# Patient Record
Sex: MALE | Race: WHITE | NOT HISPANIC OR LATINO | Employment: OTHER | URBAN - METROPOLITAN AREA
[De-identification: names, ages, dates, MRNs, and addresses within clinical notes are randomized per-mention and may not be internally consistent; named-entity substitution may affect disease eponyms.]

---

## 2015-07-21 LAB — CRC RECOMMENDATION EXT: NORMAL

## 2017-02-21 ENCOUNTER — HISTORICAL (OUTPATIENT)
Dept: LAB | Facility: HOSPITAL | Age: 67
End: 2017-02-21

## 2017-04-24 ENCOUNTER — HISTORICAL (OUTPATIENT)
Dept: LAB | Facility: HOSPITAL | Age: 67
End: 2017-04-24

## 2017-05-24 ENCOUNTER — HISTORICAL (OUTPATIENT)
Dept: LAB | Facility: HOSPITAL | Age: 67
End: 2017-05-24

## 2017-05-24 LAB
ABS NEUT (OLG): 3.43 X10(3)/MCL (ref 2.1–9.2)
APTT PPP: 26.3 SECOND(S) (ref 20.6–36)
BASOPHILS # BLD AUTO: 0.1 X10(3)/MCL (ref 0–0.2)
BASOPHILS NFR BLD AUTO: 1 %
BUN SERPL-MCNC: 15 MG/DL (ref 7–18)
CALCIUM SERPL-MCNC: 9.3 MG/DL (ref 8.5–10.1)
CHLORIDE SERPL-SCNC: 102 MMOL/L (ref 98–107)
CO2 SERPL-SCNC: 31 MMOL/L (ref 21–32)
CREAT SERPL-MCNC: 0.72 MG/DL (ref 0.7–1.3)
EOSINOPHIL # BLD AUTO: 0.2 X10(3)/MCL (ref 0–0.9)
EOSINOPHIL NFR BLD AUTO: 2 %
ERYTHROCYTE [DISTWIDTH] IN BLOOD BY AUTOMATED COUNT: 13.2 % (ref 11.5–17)
GLUCOSE SERPL-MCNC: 166 MG/DL (ref 74–106)
HCT VFR BLD AUTO: 41.5 % (ref 42–52)
HGB BLD-MCNC: 14.5 GM/DL (ref 14–18)
INR PPP: 1.01 (ref 0–1.27)
LYMPHOCYTES # BLD AUTO: 2.6 X10(3)/MCL (ref 0.6–4.6)
LYMPHOCYTES NFR BLD AUTO: 37 %
MCH RBC QN AUTO: 31.2 PG (ref 27–31)
MCHC RBC AUTO-ENTMCNC: 34.9 GM/DL (ref 33–36)
MCV RBC AUTO: 89.2 FL (ref 80–94)
MONOCYTES # BLD AUTO: 0.7 X10(3)/MCL (ref 0.1–1.3)
MONOCYTES NFR BLD AUTO: 10 %
NEUTROPHILS # BLD AUTO: 3.43 X10(3)/MCL (ref 1.4–7.9)
NEUTROPHILS NFR BLD AUTO: 50 %
PLATELET # BLD AUTO: 180 X10(3)/MCL (ref 130–400)
PMV BLD AUTO: 11.2 FL (ref 9.4–12.4)
POTASSIUM SERPL-SCNC: 4.2 MMOL/L (ref 3.5–5.1)
PROTHROMBIN TIME: 13.1 SECOND(S) (ref 12.1–14.2)
RBC # BLD AUTO: 4.65 X10(6)/MCL (ref 4.7–6.1)
SODIUM SERPL-SCNC: 141 MMOL/L (ref 136–145)
WBC # SPEC AUTO: 6.9 X10(3)/MCL (ref 4.5–11.5)

## 2017-05-30 ENCOUNTER — HISTORICAL (OUTPATIENT)
Dept: LAB | Facility: HOSPITAL | Age: 67
End: 2017-05-30

## 2017-05-30 LAB
EST. AVERAGE GLUCOSE BLD GHB EST-MCNC: 151 MG/DL
HBA1C MFR BLD: 6.9 % (ref 4.5–6.2)

## 2017-06-01 ENCOUNTER — HISTORICAL (OUTPATIENT)
Dept: ADMINISTRATIVE | Facility: HOSPITAL | Age: 67
End: 2017-06-01

## 2017-06-02 LAB
ABS NEUT (OLG): 3.94 X10(3)/MCL (ref 2.1–9.2)
BASOPHILS # BLD AUTO: 0 X10(3)/MCL (ref 0–0.2)
BASOPHILS NFR BLD AUTO: 1 %
BUN SERPL-MCNC: 11 MG/DL (ref 7–18)
CALCIUM SERPL-MCNC: 8.6 MG/DL (ref 8.5–10.1)
CHLORIDE SERPL-SCNC: 104 MMOL/L (ref 98–107)
CO2 SERPL-SCNC: 26 MMOL/L (ref 21–32)
CREAT SERPL-MCNC: 0.79 MG/DL (ref 0.7–1.3)
EOSINOPHIL # BLD AUTO: 0.2 X10(3)/MCL (ref 0–0.9)
EOSINOPHIL NFR BLD AUTO: 4 %
ERYTHROCYTE [DISTWIDTH] IN BLOOD BY AUTOMATED COUNT: 13.1 % (ref 11.5–17)
GLUCOSE SERPL-MCNC: 214 MG/DL (ref 74–106)
HCT VFR BLD AUTO: 39.4 % (ref 42–52)
HGB BLD-MCNC: 13.8 GM/DL (ref 14–18)
LYMPHOCYTES # BLD AUTO: 2.4 X10(3)/MCL (ref 0.6–4.6)
LYMPHOCYTES NFR BLD AUTO: 33 %
MCH RBC QN AUTO: 31.7 PG (ref 27–31)
MCHC RBC AUTO-ENTMCNC: 35 GM/DL (ref 33–36)
MCV RBC AUTO: 90.4 FL (ref 80–94)
MONOCYTES # BLD AUTO: 0.6 X10(3)/MCL (ref 0.1–1.3)
MONOCYTES NFR BLD AUTO: 8 %
NEUTROPHILS # BLD AUTO: 3.94 X10(3)/MCL (ref 1.4–7.9)
NEUTROPHILS NFR BLD AUTO: 55 %
PLATELET # BLD AUTO: 140 X10(3)/MCL (ref 130–400)
PMV BLD AUTO: 10.6 FL (ref 9.4–12.4)
POTASSIUM SERPL-SCNC: 3.9 MMOL/L (ref 3.5–5.1)
RBC # BLD AUTO: 4.36 X10(6)/MCL (ref 4.7–6.1)
SODIUM SERPL-SCNC: 139 MMOL/L (ref 136–145)
WBC # SPEC AUTO: 7.2 X10(3)/MCL (ref 4.5–11.5)

## 2017-09-18 ENCOUNTER — HISTORICAL (OUTPATIENT)
Dept: LAB | Facility: HOSPITAL | Age: 67
End: 2017-09-18

## 2017-09-18 LAB
EST. AVERAGE GLUCOSE BLD GHB EST-MCNC: 143 MG/DL
HBA1C MFR BLD: 6.6 % (ref 4.5–6.2)

## 2017-11-21 ENCOUNTER — HISTORICAL (OUTPATIENT)
Dept: PHYSICAL THERAPY | Facility: HOSPITAL | Age: 67
End: 2017-11-21

## 2017-11-29 ENCOUNTER — HISTORICAL (OUTPATIENT)
Dept: PHYSICAL THERAPY | Facility: HOSPITAL | Age: 67
End: 2017-11-29

## 2017-12-01 ENCOUNTER — HISTORICAL (OUTPATIENT)
Dept: PHYSICAL THERAPY | Facility: HOSPITAL | Age: 67
End: 2017-12-01

## 2017-12-08 ENCOUNTER — HISTORICAL (OUTPATIENT)
Dept: PHYSICAL THERAPY | Facility: HOSPITAL | Age: 67
End: 2017-12-08

## 2017-12-12 ENCOUNTER — HISTORICAL (OUTPATIENT)
Dept: PHYSICAL THERAPY | Facility: HOSPITAL | Age: 67
End: 2017-12-12

## 2017-12-14 ENCOUNTER — HISTORICAL (OUTPATIENT)
Dept: PHYSICAL THERAPY | Facility: HOSPITAL | Age: 67
End: 2017-12-14

## 2017-12-20 ENCOUNTER — HISTORICAL (OUTPATIENT)
Dept: LAB | Facility: HOSPITAL | Age: 67
End: 2017-12-20

## 2017-12-20 LAB
ABS NEUT (OLG): 2.9 X10(3)/MCL (ref 2.1–9.2)
BASOPHILS # BLD AUTO: 0 X10(3)/MCL
BASOPHILS NFR BLD AUTO: 1 % (ref 0–2)
BUN SERPL-MCNC: 24.2 MG/DL (ref 7–18)
CALCIUM SERPL-MCNC: 9.5 MG/DL (ref 8.5–10.1)
CHLORIDE SERPL-SCNC: 103 MMOL/L (ref 98–107)
CO2 SERPL-SCNC: 32.8 MMOL/L (ref 21–32)
CREAT SERPL-MCNC: 0.77 MG/DL (ref 0.6–1.3)
EOSINOPHIL # BLD AUTO: 0.2 X10(3)/MCL
EOSINOPHIL NFR BLD AUTO: 2 %
ERYTHROCYTE [DISTWIDTH] IN BLOOD BY AUTOMATED COUNT: 12.8 % (ref 11.5–17)
EST. AVERAGE GLUCOSE BLD GHB EST-MCNC: 146 MG/DL
GLUCOSE SERPL-MCNC: 141 MG/DL (ref 74–106)
HBA1C MFR BLD: 6.7 % (ref 4.5–6.2)
HCT VFR BLD AUTO: 40.4 % (ref 42–52)
HGB BLD-MCNC: 13.8 GM/DL (ref 14–18)
LYMPHOCYTES # BLD AUTO: 3.1 X10(3)/MCL
LYMPHOCYTES NFR BLD AUTO: 46 % (ref 13–40)
MCH RBC QN AUTO: 30.6 PG (ref 27–31)
MCHC RBC AUTO-ENTMCNC: 34.1 GM/DL (ref 33–36)
MCV RBC AUTO: 89.8 FL (ref 80–94)
MONOCYTES # BLD AUTO: 0.6 X10(3)/MCL
MONOCYTES NFR BLD AUTO: 9 % (ref 2–11)
NEUTROPHILS # BLD AUTO: 2.9 X10(3)/MCL (ref 2.1–9.2)
NEUTROPHILS NFR BLD AUTO: 42 % (ref 47–80)
PLATELET # BLD AUTO: 156 X10(3)/MCL (ref 130–400)
PMV BLD AUTO: 8.8 FL (ref 7.4–10.4)
POTASSIUM SERPL-SCNC: 4.5 MMOL/L (ref 3.5–5.1)
RBC # BLD AUTO: 4.5 X10(6)/MCL (ref 4.7–6.1)
SODIUM SERPL-SCNC: 141 MMOL/L (ref 136–145)
WBC # SPEC AUTO: 6.8 X10(3)/MCL (ref 4.5–11.5)

## 2017-12-21 ENCOUNTER — HISTORICAL (OUTPATIENT)
Dept: PHYSICAL THERAPY | Facility: HOSPITAL | Age: 67
End: 2017-12-21

## 2017-12-28 ENCOUNTER — HISTORICAL (OUTPATIENT)
Dept: PHYSICAL THERAPY | Facility: HOSPITAL | Age: 67
End: 2017-12-28

## 2018-01-03 ENCOUNTER — HISTORICAL (OUTPATIENT)
Dept: PHYSICAL THERAPY | Facility: HOSPITAL | Age: 68
End: 2018-01-03

## 2018-01-08 ENCOUNTER — HISTORICAL (OUTPATIENT)
Dept: PHYSICAL THERAPY | Facility: HOSPITAL | Age: 68
End: 2018-01-08

## 2018-01-12 ENCOUNTER — HISTORICAL (OUTPATIENT)
Dept: PHYSICAL THERAPY | Facility: HOSPITAL | Age: 68
End: 2018-01-12

## 2018-01-19 ENCOUNTER — HISTORICAL (OUTPATIENT)
Dept: PHYSICAL THERAPY | Facility: HOSPITAL | Age: 68
End: 2018-01-19

## 2018-03-21 ENCOUNTER — HISTORICAL (OUTPATIENT)
Dept: LAB | Facility: HOSPITAL | Age: 68
End: 2018-03-21

## 2018-03-21 LAB
ABS NEUT (OLG): 3.9 X10(3)/MCL (ref 2.1–9.2)
ALBUMIN SERPL-MCNC: 3.7 GM/DL (ref 3.4–5)
ALBUMIN/GLOB SERPL: 1.1 RATIO (ref 1.1–2)
ALP SERPL-CCNC: 63 UNIT/L (ref 46–116)
ALT SERPL-CCNC: 36 UNIT/L (ref 12–78)
AST SERPL-CCNC: 17 UNIT/L (ref 15–37)
BASOPHILS NFR BLD AUTO: 1 % (ref 0–2)
BILIRUB SERPL-MCNC: 0.5 MG/DL (ref 0.2–1)
BILIRUBIN DIRECT+TOT PNL SERPL-MCNC: 0.15 MG/DL (ref 0–0.2)
BILIRUBIN DIRECT+TOT PNL SERPL-MCNC: 0.33 MG/DL (ref 0–0.8)
BUN SERPL-MCNC: 19.4 MG/DL (ref 7–18)
CALCIUM SERPL-MCNC: 8.8 MG/DL (ref 8.5–10.1)
CHLORIDE SERPL-SCNC: 100 MMOL/L (ref 98–107)
CHOLEST SERPL-MCNC: 165 MG/DL (ref 0–200)
CHOLEST/HDLC SERPL: 4 {RATIO} (ref 0–5)
CO2 SERPL-SCNC: 29.7 MMOL/L (ref 21–32)
CREAT SERPL-MCNC: 0.83 MG/DL (ref 0.6–1.3)
EOSINOPHIL # BLD AUTO: 0.2 X10(3)/MCL
EOSINOPHIL NFR BLD AUTO: 2 %
ERYTHROCYTE [DISTWIDTH] IN BLOOD BY AUTOMATED COUNT: 13.5 % (ref 11.5–17)
EST. AVERAGE GLUCOSE BLD GHB EST-MCNC: 131 MG/DL
GLOBULIN SER-MCNC: 3.4 GM/DL (ref 2.4–3.5)
GLUCOSE SERPL-MCNC: 238 MG/DL (ref 74–106)
HBA1C MFR BLD: 6.2 % (ref 4.5–6.2)
HCT VFR BLD AUTO: 43.3 % (ref 42–52)
HDLC SERPL-MCNC: 41 MG/DL (ref 40–60)
HGB BLD-MCNC: 14.7 GM/DL (ref 14–18)
LDLC SERPL CALC-MCNC: 55 MG/DL (ref 0–129)
LYMPHOCYTES # BLD AUTO: 2.7 X10(3)/MCL
LYMPHOCYTES NFR BLD AUTO: 36 % (ref 13–40)
MCH RBC QN AUTO: 30.7 PG (ref 27–31)
MCHC RBC AUTO-ENTMCNC: 33.9 GM/DL (ref 33–36)
MCV RBC AUTO: 90.6 FL (ref 80–94)
MONOCYTES # BLD AUTO: 0.6 X10(3)/MCL
MONOCYTES NFR BLD AUTO: 8 % (ref 2–11)
NEUTROPHILS # BLD AUTO: 3.9 X10(3)/MCL (ref 2.1–9.2)
NEUTROPHILS NFR BLD AUTO: 52 % (ref 47–80)
PLATELET # BLD AUTO: 160 X10(3)/MCL (ref 130–400)
PMV BLD AUTO: 9.3 FL (ref 7.4–10.4)
POTASSIUM SERPL-SCNC: 3.9 MMOL/L (ref 3.5–5.1)
PROT SERPL-MCNC: 7.1 GM/DL (ref 6.4–8.2)
RBC # BLD AUTO: 4.77 X10(6)/MCL (ref 4.7–6.1)
SODIUM SERPL-SCNC: 137 MMOL/L (ref 136–145)
TRIGL SERPL-MCNC: 344 MG/DL
VLDLC SERPL CALC-MCNC: 69 MG/DL
WBC # SPEC AUTO: 7.5 X10(3)/MCL (ref 4.5–11.5)

## 2018-10-02 ENCOUNTER — HISTORICAL (OUTPATIENT)
Dept: ADMINISTRATIVE | Facility: HOSPITAL | Age: 68
End: 2018-10-02

## 2018-12-04 ENCOUNTER — HISTORICAL (OUTPATIENT)
Dept: LAB | Facility: HOSPITAL | Age: 68
End: 2018-12-04

## 2018-12-04 LAB
ALBUMIN SERPL-MCNC: 3.9 GM/DL (ref 3.4–5)
ALBUMIN/GLOB SERPL: 1.1 RATIO (ref 1.1–2)
ALP SERPL-CCNC: 69 UNIT/L (ref 46–116)
ALT SERPL-CCNC: 36 UNIT/L (ref 12–78)
AST SERPL-CCNC: 24 UNIT/L (ref 15–37)
BILIRUB SERPL-MCNC: 0.3 MG/DL (ref 0.2–1)
BILIRUBIN DIRECT+TOT PNL SERPL-MCNC: 0.09 MG/DL (ref 0–0.2)
BILIRUBIN DIRECT+TOT PNL SERPL-MCNC: 0.21 MG/DL (ref 0–0.8)
BUN SERPL-MCNC: 19 MG/DL (ref 7–18)
CALCIUM SERPL-MCNC: 9.3 MG/DL (ref 8.5–10.1)
CHLORIDE SERPL-SCNC: 102 MMOL/L (ref 98–107)
CO2 SERPL-SCNC: 30.6 MMOL/L (ref 21–32)
CREAT SERPL-MCNC: 0.83 MG/DL (ref 0.6–1.3)
EST. AVERAGE GLUCOSE BLD GHB EST-MCNC: 151 MG/DL
GLOBULIN SER-MCNC: 3.6 GM/DL (ref 2.4–3.5)
GLUCOSE SERPL-MCNC: 134 MG/DL (ref 74–106)
HBA1C MFR BLD: 6.9 % (ref 4.5–6.2)
POTASSIUM SERPL-SCNC: 4.1 MMOL/L (ref 3.5–5.1)
PROT SERPL-MCNC: 7.5 GM/DL (ref 6.4–8.2)
SODIUM SERPL-SCNC: 140 MMOL/L (ref 136–145)

## 2019-08-06 ENCOUNTER — HISTORICAL (OUTPATIENT)
Dept: LAB | Facility: HOSPITAL | Age: 69
End: 2019-08-06

## 2019-08-06 LAB
ABS NEUT (OLG): 3.39 X10(3)/MCL (ref 2.1–9.2)
ALBUMIN SERPL-MCNC: 3.7 GM/DL (ref 3.4–5)
ALBUMIN/GLOB SERPL: 1 RATIO (ref 1.1–2)
ALP SERPL-CCNC: 67 UNIT/L (ref 46–116)
ALT SERPL-CCNC: 32 UNIT/L (ref 12–78)
AST SERPL-CCNC: 19 UNIT/L (ref 15–37)
BASOPHILS # BLD AUTO: 0 X10(3)/MCL (ref 0–0.2)
BASOPHILS NFR BLD AUTO: 0 %
BILIRUB SERPL-MCNC: 0.5 MG/DL (ref 0.2–1)
BILIRUBIN DIRECT+TOT PNL SERPL-MCNC: 0.12 MG/DL (ref 0–0.2)
BILIRUBIN DIRECT+TOT PNL SERPL-MCNC: 0.38 MG/DL (ref 0–0.8)
BUN SERPL-MCNC: 20 MG/DL (ref 7–18)
CALCIUM SERPL-MCNC: 9.5 MG/DL (ref 8.5–10.1)
CHLORIDE SERPL-SCNC: 102 MMOL/L (ref 98–107)
CHOLEST SERPL-MCNC: 159 MG/DL (ref 0–200)
CHOLEST/HDLC SERPL: 4.3 {RATIO} (ref 0–5)
CO2 SERPL-SCNC: 28.5 MMOL/L (ref 21–32)
CREAT SERPL-MCNC: 0.81 MG/DL (ref 0.6–1.3)
EOSINOPHIL # BLD AUTO: 0.3 X10(3)/MCL (ref 0–0.9)
EOSINOPHIL NFR BLD AUTO: 4 %
ERYTHROCYTE [DISTWIDTH] IN BLOOD BY AUTOMATED COUNT: 13.2 % (ref 11.5–17)
EST. AVERAGE GLUCOSE BLD GHB EST-MCNC: 140 MG/DL
GLOBULIN SER-MCNC: 3.8 GM/DL (ref 2.4–3.5)
GLUCOSE SERPL-MCNC: 162 MG/DL (ref 74–106)
HBA1C MFR BLD: 6.5 % (ref 4.5–6.2)
HCT VFR BLD AUTO: 41.9 % (ref 42–52)
HDLC SERPL-MCNC: 37 MG/DL (ref 40–60)
HGB BLD-MCNC: 14.4 GM/DL (ref 14–18)
IMM GRANULOCYTES # BLD AUTO: 0.01 % (ref 0–0.02)
IMM GRANULOCYTES NFR BLD AUTO: 0.1 % (ref 0–0.43)
LDLC SERPL CALC-MCNC: 59 MG/DL (ref 0–129)
LYMPHOCYTES # BLD AUTO: 2.7 X10(3)/MCL (ref 0.6–4.6)
LYMPHOCYTES NFR BLD AUTO: 38 %
MCH RBC QN AUTO: 31.2 PG (ref 27–31)
MCHC RBC AUTO-ENTMCNC: 34.4 GM/DL (ref 33–36)
MCV RBC AUTO: 90.7 FL (ref 80–94)
MONOCYTES # BLD AUTO: 0.7 X10(3)/MCL (ref 0.1–1.3)
MONOCYTES NFR BLD AUTO: 10 %
NEUTROPHILS # BLD AUTO: 3.39 X10(3)/MCL (ref 1.4–7.9)
NEUTROPHILS NFR BLD AUTO: 48 %
PLATELET # BLD AUTO: 160 X10(3)/MCL (ref 130–400)
PMV BLD AUTO: 10.2 FL (ref 9.4–12.4)
POTASSIUM SERPL-SCNC: 3.6 MMOL/L (ref 3.5–5.1)
PROT SERPL-MCNC: 7.5 GM/DL (ref 6.4–8.2)
RBC # BLD AUTO: 4.62 X10(6)/MCL (ref 4.7–6.1)
SODIUM SERPL-SCNC: 141 MMOL/L (ref 136–145)
TRIGL SERPL-MCNC: 317 MG/DL
TSH SERPL-ACNC: 1.34 MIU/ML (ref 0.36–3.74)
URATE SERPL-MCNC: 7.1 MG/DL (ref 3.4–7)
VLDLC SERPL CALC-MCNC: 63 MG/DL
WBC # SPEC AUTO: 7.1 X10(3)/MCL (ref 4.5–11.5)

## 2019-08-29 ENCOUNTER — HISTORICAL (OUTPATIENT)
Dept: RADIOLOGY | Facility: HOSPITAL | Age: 69
End: 2019-08-29

## 2020-01-27 ENCOUNTER — HISTORICAL (OUTPATIENT)
Dept: LAB | Facility: HOSPITAL | Age: 70
End: 2020-01-27

## 2020-01-27 LAB
ALBUMIN SERPL-MCNC: 3.4 GM/DL (ref 3.4–5)
ALBUMIN/GLOB SERPL: 0.9 RATIO (ref 1.1–2)
ALP SERPL-CCNC: 69 UNIT/L (ref 46–116)
ALT SERPL-CCNC: 37 UNIT/L (ref 12–78)
AST SERPL-CCNC: 27 UNIT/L (ref 15–37)
BILIRUB SERPL-MCNC: 0.4 MG/DL (ref 0.2–1)
BILIRUBIN DIRECT+TOT PNL SERPL-MCNC: 0.13 MG/DL (ref 0–0.2)
BILIRUBIN DIRECT+TOT PNL SERPL-MCNC: 0.27 MG/DL (ref 0–0.8)
BUN SERPL-MCNC: 15.6 MG/DL (ref 7–18)
CALCIUM SERPL-MCNC: 9.4 MG/DL (ref 8.5–10.1)
CHLORIDE SERPL-SCNC: 103 MMOL/L (ref 98–107)
CHOLEST SERPL-MCNC: 188 MG/DL (ref 0–200)
CHOLEST/HDLC SERPL: 4.6 {RATIO} (ref 0–5)
CO2 SERPL-SCNC: 30.5 MMOL/L (ref 21–32)
CREAT SERPL-MCNC: 0.8 MG/DL (ref 0.6–1.3)
EST. AVERAGE GLUCOSE BLD GHB EST-MCNC: 206 MG/DL
GLOBULIN SER-MCNC: 3.6 GM/DL (ref 2.4–3.5)
GLUCOSE SERPL-MCNC: 141 MG/DL (ref 74–106)
HBA1C MFR BLD: 8.8 % (ref 4.5–6.2)
HDLC SERPL-MCNC: 41 MG/DL (ref 40–60)
LDLC SERPL CALC-MCNC: 98 MG/DL (ref 0–129)
POTASSIUM SERPL-SCNC: 4.1 MMOL/L (ref 3.5–5.1)
PROT SERPL-MCNC: 7 GM/DL (ref 6.4–8.2)
SODIUM SERPL-SCNC: 144 MMOL/L (ref 136–145)
TRIGL SERPL-MCNC: 245 MG/DL
TSH SERPL-ACNC: 1.29 MIU/ML (ref 0.36–3.74)
VLDLC SERPL CALC-MCNC: 49 MG/DL

## 2020-08-12 ENCOUNTER — HISTORICAL (OUTPATIENT)
Dept: LAB | Facility: HOSPITAL | Age: 70
End: 2020-08-12

## 2020-08-12 LAB
ABS NEUT (OLG): 3.3 X10(3)/MCL (ref 2.1–9.2)
ALBUMIN SERPL-MCNC: 3.4 GM/DL (ref 3.4–5)
ALBUMIN/GLOB SERPL: 1 RATIO (ref 1.1–2)
ALP SERPL-CCNC: 58 UNIT/L (ref 46–116)
ALT SERPL-CCNC: 28 UNIT/L (ref 12–78)
AST SERPL-CCNC: 21 UNIT/L (ref 15–37)
BASOPHILS # BLD AUTO: 0 X10(3)/MCL (ref 0–0.2)
BASOPHILS NFR BLD AUTO: 0 %
BILIRUB SERPL-MCNC: 0.4 MG/DL (ref 0.2–1)
BILIRUBIN DIRECT+TOT PNL SERPL-MCNC: 0.11 MG/DL (ref 0–0.2)
BILIRUBIN DIRECT+TOT PNL SERPL-MCNC: 0.29 MG/DL (ref 0–0.8)
BUN SERPL-MCNC: 19.1 MG/DL (ref 7–18)
CALCIUM SERPL-MCNC: 9.3 MG/DL (ref 8.5–10.1)
CHLORIDE SERPL-SCNC: 106 MMOL/L (ref 98–107)
CHOLEST SERPL-MCNC: 166 MG/DL (ref 0–200)
CHOLEST/HDLC SERPL: 3.6 {RATIO} (ref 0–5)
CO2 SERPL-SCNC: 25.1 MMOL/L (ref 21–32)
CREAT SERPL-MCNC: 0.62 MG/DL (ref 0.6–1.3)
EOSINOPHIL # BLD AUTO: 0.2 X10(3)/MCL (ref 0–0.9)
EOSINOPHIL NFR BLD AUTO: 4 %
ERYTHROCYTE [DISTWIDTH] IN BLOOD BY AUTOMATED COUNT: 13 % (ref 11.5–17)
EST. AVERAGE GLUCOSE BLD GHB EST-MCNC: 220 MG/DL
GLOBULIN SER-MCNC: 3.3 GM/DL (ref 2.4–3.5)
GLUCOSE SERPL-MCNC: 113 MG/DL (ref 74–106)
HBA1C MFR BLD: 9.3 % (ref 4.5–6.2)
HCT VFR BLD AUTO: 38.7 % (ref 42–52)
HDLC SERPL-MCNC: 46 MG/DL (ref 40–60)
HGB BLD-MCNC: 13.2 GM/DL (ref 14–18)
IMM GRANULOCYTES # BLD AUTO: 0.01 % (ref 0–0.02)
IMM GRANULOCYTES NFR BLD AUTO: 0.1 % (ref 0–0.43)
LDLC SERPL CALC-MCNC: 87 MG/DL (ref 0–129)
LYMPHOCYTES # BLD AUTO: 2.9 X10(3)/MCL (ref 0.6–4.6)
LYMPHOCYTES NFR BLD AUTO: 40 %
MCH RBC QN AUTO: 29.5 PG (ref 27–31)
MCHC RBC AUTO-ENTMCNC: 34.1 GM/DL (ref 33–36)
MCV RBC AUTO: 86.4 FL (ref 80–94)
MONOCYTES # BLD AUTO: 0.6 X10(3)/MCL (ref 0.1–1.3)
MONOCYTES NFR BLD AUTO: 9 %
NEUTROPHILS # BLD AUTO: 3.3 X10(3)/MCL (ref 1.4–7.9)
NEUTROPHILS NFR BLD AUTO: 47 %
PLATELET # BLD AUTO: 165 X10(3)/MCL (ref 130–400)
PMV BLD AUTO: 10.7 FL (ref 9.4–12.4)
POTASSIUM SERPL-SCNC: 4.2 MMOL/L (ref 3.5–5.1)
PROT SERPL-MCNC: 6.7 GM/DL (ref 6.4–8.2)
RBC # BLD AUTO: 4.48 X10(6)/MCL (ref 4.7–6.1)
SODIUM SERPL-SCNC: 143 MMOL/L (ref 136–145)
TRIGL SERPL-MCNC: 167 MG/DL
VLDLC SERPL CALC-MCNC: 33 MG/DL
WBC # SPEC AUTO: 7.1 X10(3)/MCL (ref 4.5–11.5)

## 2021-05-25 ENCOUNTER — HISTORICAL (OUTPATIENT)
Dept: LAB | Facility: HOSPITAL | Age: 71
End: 2021-05-25

## 2021-05-25 LAB
ABS NEUT (OLG): 3.04 X10(3)/MCL (ref 2.1–9.2)
ALBUMIN SERPL-MCNC: 3.7 GM/DL (ref 3.4–4.8)
ALBUMIN/GLOB SERPL: 1.1 RATIO (ref 1.1–2)
ALP SERPL-CCNC: 55 UNIT/L (ref 40–150)
ALT SERPL-CCNC: 15 UNIT/L (ref 0–55)
AST SERPL-CCNC: 18 UNIT/L (ref 5–34)
BASOPHILS # BLD AUTO: 0 X10(3)/MCL (ref 0–0.2)
BASOPHILS NFR BLD AUTO: 0 %
BILIRUB SERPL-MCNC: 0.5 MG/DL
BILIRUBIN DIRECT+TOT PNL SERPL-MCNC: 0.2 MG/DL (ref 0–0.5)
BILIRUBIN DIRECT+TOT PNL SERPL-MCNC: 0.3 MG/DL (ref 0–0.8)
BUN SERPL-MCNC: 9.3 MG/DL (ref 8.4–25.7)
CALCIUM SERPL-MCNC: 9.4 MG/DL (ref 8.8–10)
CHLORIDE SERPL-SCNC: 103 MMOL/L (ref 98–107)
CHOLEST SERPL-MCNC: 222 MG/DL
CHOLEST/HDLC SERPL: 6 {RATIO} (ref 0–5)
CO2 SERPL-SCNC: 31 MMOL/L (ref 23–31)
CREAT SERPL-MCNC: 0.79 MG/DL (ref 0.73–1.18)
EOSINOPHIL # BLD AUTO: 0.2 X10(3)/MCL (ref 0–0.9)
EOSINOPHIL NFR BLD AUTO: 3 %
ERYTHROCYTE [DISTWIDTH] IN BLOOD BY AUTOMATED COUNT: 14 % (ref 11.5–17)
EST. AVERAGE GLUCOSE BLD GHB EST-MCNC: 228.8 MG/DL
GLOBULIN SER-MCNC: 3.4 GM/DL (ref 2.4–3.5)
GLUCOSE SERPL-MCNC: 182 MG/DL (ref 82–115)
HBA1C MFR BLD: 9.6 %
HCT VFR BLD AUTO: 46.5 % (ref 42–52)
HDLC SERPL-MCNC: 37 MG/DL (ref 35–60)
HGB BLD-MCNC: 15.5 GM/DL (ref 14–18)
IMM GRANULOCYTES # BLD AUTO: 0.01 % (ref 0–0.02)
IMM GRANULOCYTES NFR BLD AUTO: 0.2 % (ref 0–0.43)
LDLC SERPL CALC-MCNC: 155 MG/DL (ref 50–140)
LYMPHOCYTES # BLD AUTO: 2.5 X10(3)/MCL (ref 0.6–4.6)
LYMPHOCYTES NFR BLD AUTO: 39 %
MCH RBC QN AUTO: 29.2 PG (ref 27–31)
MCHC RBC AUTO-ENTMCNC: 33.3 GM/DL (ref 33–36)
MCV RBC AUTO: 87.6 FL (ref 80–94)
MONOCYTES # BLD AUTO: 0.6 X10(3)/MCL (ref 0.1–1.3)
MONOCYTES NFR BLD AUTO: 9 %
NEUTROPHILS # BLD AUTO: 3.04 X10(3)/MCL (ref 1.4–7.9)
NEUTROPHILS NFR BLD AUTO: 48 %
PLATELET # BLD AUTO: 166 X10(3)/MCL (ref 130–400)
PMV BLD AUTO: 10.5 FL (ref 9.4–12.4)
POTASSIUM SERPL-SCNC: 4.6 MMOL/L (ref 3.5–5.1)
PROT SERPL-MCNC: 7.1 GM/DL (ref 5.8–7.6)
RBC # BLD AUTO: 5.31 X10(6)/MCL (ref 4.7–6.1)
SODIUM SERPL-SCNC: 141 MMOL/L (ref 136–145)
TRIGL SERPL-MCNC: 149 MG/DL (ref 34–140)
VLDLC SERPL CALC-MCNC: 30 MG/DL
WBC # SPEC AUTO: 6.3 X10(3)/MCL (ref 4.5–11.5)

## 2021-07-29 ENCOUNTER — HISTORICAL (OUTPATIENT)
Dept: RADIOLOGY | Facility: HOSPITAL | Age: 71
End: 2021-07-29

## 2021-08-17 ENCOUNTER — HISTORICAL (OUTPATIENT)
Dept: RADIOLOGY | Facility: HOSPITAL | Age: 71
End: 2021-08-17

## 2021-08-17 LAB
ALBUMIN % SPEP (OHS): 46.76 % (ref 48.1–59.5)
ALBUMIN SERPL-MCNC: 3.8 GM/DL (ref 3.4–4.8)
ALBUMIN/GLOB SERPL: 1.2 RATIO (ref 1.1–2)
ALPHA 1 GLOB (OHS): 0.22 GM/DL (ref 0–0.4)
ALPHA 1 GLOB% (OHS): 3.1 % (ref 2.3–4.9)
ALPHA 2 GLOB % (OHS): 12.64 % (ref 6.9–13)
ALPHA 2 GLOB (OHS): 0.9 GM/DL (ref 0.4–1)
ANTINUCLEAR ANTIBODY SCREEN (OHS): NEGATIVE
BETA GLOB (OHS): 1.29 GM/DL (ref 0.7–1.3)
BETA GLOB% (OHS): 18.12 % (ref 13.8–19.7)
CENTROMERE PROTEIN ANTIBODY (OHS): NEGATIVE
CRP SERPL-MCNC: 1 MG/DL (ref 0–1)
DSDNA ANTIBODY (OHS): POSITIVE
ERYTHROCYTE [SEDIMENTATION RATE] IN BLOOD: 65 MM/HR (ref 0–15)
EST. AVERAGE GLUCOSE BLD GHB EST-MCNC: 246 MG/DL
FOLATE SERPL-MCNC: 17.5 NG/ML (ref 7–31.4)
GAMMA GLOBULIN % (OHS): 19.37 % (ref 10.1–21.9)
GAMMA GLOBULIN (OHS): 1.38 GM/DL (ref 0.4–1.8)
GLOBULIN SER-MCNC: 3.3 GM/DL (ref 2.4–3.5)
HBA1C MFR BLD: 10.2 %
IFE INTERPRETATION (OHS): ABNORMAL
IGA SERPL-MCNC: 328 MG/DL (ref 101–645)
IGG SERPL-MCNC: 1118 MG/DL (ref 240–1822)
IGM SERPL-MCNC: 130 MG/DL (ref 22–240)
JO-1 ANTIBODY (OHS): NEGATIVE
M SPIKE % (OHS): ABNORMAL
M SPIKE (OHS): ABNORMAL
PEP GRAPH (OHS): ABNORMAL
PROT SERPL-MCNC: 7.1 GM/DL (ref 5.8–7.6)
RHEUMATOID FACT SERPL-ACNC: <13 IU/ML
RNP70 ANTIBODY (OHS): NEGATIVE
SCLERODERMA (SCL-70S) ANTIBODY (OHS): NEGATIVE
SMITH DP IGG (OHS): NEGATIVE
SSA(RO) ANTIBODY (OHS): NEGATIVE
SSB(LA) ANTIBODY (OHS): NEGATIVE
TSH SERPL-ACNC: 0.74 UIU/ML (ref 0.35–4.94)
U1RNP ANTIBODY (OHS): NEGATIVE
VIT B12 SERPL-MCNC: 545 PG/ML (ref 213–816)

## 2022-01-20 ENCOUNTER — HISTORICAL (OUTPATIENT)
Dept: LAB | Facility: HOSPITAL | Age: 72
End: 2022-01-20

## 2022-01-20 LAB
ABS NEUT (OLG): 3.19 X10(3)/MCL (ref 2.1–9.2)
BASOPHILS # BLD AUTO: 0 X10(3)/MCL (ref 0–0.2)
BASOPHILS NFR BLD AUTO: 0 %
EOSINOPHIL # BLD AUTO: 0.2 X10(3)/MCL (ref 0–0.9)
EOSINOPHIL NFR BLD AUTO: 3 %
ERYTHROCYTE [DISTWIDTH] IN BLOOD BY AUTOMATED COUNT: 12.9 % (ref 11.5–17)
HCT VFR BLD AUTO: 41.1 % (ref 42–52)
HGB BLD-MCNC: 14.6 GM/DL (ref 14–18)
LYMPHOCYTES # BLD AUTO: 3 X10(3)/MCL (ref 0.6–4.6)
LYMPHOCYTES NFR BLD AUTO: 44 %
MCH RBC QN AUTO: 30.7 PG (ref 27–31)
MCHC RBC AUTO-ENTMCNC: 35.5 GM/DL (ref 33–36)
MCV RBC AUTO: 86.3 FL (ref 80–94)
MONOCYTES # BLD AUTO: 0.5 X10(3)/MCL (ref 0.1–1.3)
MONOCYTES NFR BLD AUTO: 7 %
NEUTROPHILS # BLD AUTO: 3.19 X10(3)/MCL (ref 1.4–7.9)
NEUTROPHILS NFR BLD AUTO: 46 %
PLATELET # BLD AUTO: 166 X10(3)/MCL (ref 130–400)
PMV BLD AUTO: 10.5 FL (ref 9.4–12.4)
RBC # BLD AUTO: 4.76 X10(6)/MCL (ref 4.7–6.1)
WBC # SPEC AUTO: 7 X10(3)/MCL (ref 4.5–11.5)

## 2022-01-27 ENCOUNTER — HISTORICAL (OUTPATIENT)
Dept: LAB | Facility: HOSPITAL | Age: 72
End: 2022-01-27

## 2022-04-11 ENCOUNTER — HISTORICAL (OUTPATIENT)
Dept: ADMINISTRATIVE | Facility: HOSPITAL | Age: 72
End: 2022-04-11
Payer: MEDICARE

## 2022-04-28 VITALS
DIASTOLIC BLOOD PRESSURE: 71 MMHG | BODY MASS INDEX: 31.49 KG/M2 | SYSTOLIC BLOOD PRESSURE: 128 MMHG | OXYGEN SATURATION: 97 % | HEIGHT: 67 IN | WEIGHT: 200.63 LBS

## 2022-04-30 NOTE — OP NOTE
DATE OF SURGERY:        SURGEON:  Tasha Dior    PREOPERATIVE DIAGNOSIS:  Acute postoperative endophthalmitis of the right eye.    POSTOPERATIVE DIAGNOSIS:  Acute postoperative endophthalmitis of the right eye.    PROCEDURE PERFORMED:  Pars plana vitrectomy with vitreous sampling for culture and injection of antibiotics, all to the right eye.    ANESTHESIA:  General.    ESTIMATED BLOOD LOSS:  Less than 5 cc.    COMPLICATIONS:  None.    INDICATIONS:  Mr. Mancia has a history of an intraocular lens exchange and approximately 1 week later developed eye hypopyon and detritus with possible indication of acute postoperative endophthalmitis.  He requires a vitrectomy with vitreous sampling and injection of antibiotics.    PROCEDURE IN DETAIL:  The patient was taken to the operative theater where general anesthesia was begun.  The right eye was prepped and draped in the normal sterile fashion and a lid speculum was applied.  A standard 3-port 25 gauge pars plana vitrectomy was performed with all trocars being placed 3.5 mm from the surgical limbus.  The initial 0.3 cc of vitreous fluid was removed in a pure sample and sent for culture and Gram stain.  The vitrectomy was continued and there was evidence of white purulent material in the vitreous and the view was poor due to the hypopyon, so a corneal paracentesis was created and the vitrectomy cutter was used to aspirate the hypopyon, clearing the view through the anterior chamber.  The vitrectomy was then continued and the posterior hyaloid was removed out to the peripheral retina, as a posterior vitreous detachment already existed.  The retina was seen to be attached.  There was a small area of retinal infiltrate in the inferior macula, but no other retinal whitening was apparent.  No retinal breaks were identified, although the view of the peripheral retina was still poor because of opacified vitreous base.  Intravitreal injections of 1 mg of vancomycin in  0.1 cc of fluid, as well as 2.25 mg of ceftazidime and 0.1 cc of fluid and 4 mg of dexamethasone in 0.1 cc of fluid were all given intravitreally through separate tuberculin syringes and separate 30 gauge needles through the superior temporal sclerotomy.  All trocars were removed and all sclerotomies were closed with 6-0 fast absorbing gut.  Subconjunctival injections of all 3 of the previous injections were performed subconjunctivally in an amount of 0.3 cc of each.  Several drops of TobraDex ophthalmic solution were also applied to the eye.  The postoperative intraocular pressure was 15 mmHg.  The lid speculum and eye drape were then removed and the eye was covered with a gauze patch and a Coello shield.  The patient was then transported to the postoperative care unit to recover.    DISCHARGE CONDITION:  Good.    DISPOSITION:  Home with followup with Dr. Calvin the following day.  This patient tolerated the procedure well.        ______________________________  Tasha JARRETT  DD:  10/02/2018  Time:  01:53PM  DT:  10/02/2018  Time:  02:17PM  Job #:  [id number]

## 2022-05-27 ENCOUNTER — LAB VISIT (OUTPATIENT)
Dept: LAB | Facility: HOSPITAL | Age: 72
End: 2022-05-27
Payer: MEDICARE

## 2022-05-27 DIAGNOSIS — R79.9 ABNORMAL FINDING OF BLOOD CHEMISTRY, UNSPECIFIED: ICD-10-CM

## 2022-05-27 DIAGNOSIS — Z00.00 ROUTINE GENERAL MEDICAL EXAMINATION AT A HEALTH CARE FACILITY: Primary | ICD-10-CM

## 2022-05-27 LAB
CREAT UR-MCNC: 111.6 MG/DL (ref 63–166)
EST. AVERAGE GLUCOSE BLD GHB EST-MCNC: 352.2 MG/DL
HBA1C MFR BLD: 13.9 %
MICROALBUMIN UR-MCNC: 32.1 UG/ML
MICROALBUMIN/CREAT RATIO PNL UR: 28.8 MG/GM CR (ref 0–30)

## 2022-05-27 PROCEDURE — 36415 COLL VENOUS BLD VENIPUNCTURE: CPT

## 2022-05-27 PROCEDURE — 83036 HEMOGLOBIN GLYCOSYLATED A1C: CPT

## 2022-05-27 PROCEDURE — 82043 UR ALBUMIN QUANTITATIVE: CPT

## 2022-06-21 ENCOUNTER — HOSPITAL ENCOUNTER (EMERGENCY)
Facility: HOSPITAL | Age: 72
Discharge: HOME | End: 2022-06-21
Attending: EMERGENCY MEDICINE
Payer: MEDICARE

## 2022-06-21 VITALS
HEIGHT: 69 IN | RESPIRATION RATE: 20 BRPM | TEMPERATURE: 98 F | WEIGHT: 199 LBS | HEART RATE: 62 BPM | DIASTOLIC BLOOD PRESSURE: 61 MMHG | BODY MASS INDEX: 29.47 KG/M2 | OXYGEN SATURATION: 98 % | SYSTOLIC BLOOD PRESSURE: 156 MMHG

## 2022-06-21 DIAGNOSIS — Z13.9 ENCOUNTER FOR MEDICAL SCREENING EXAMINATION: Primary | ICD-10-CM

## 2022-06-21 PROCEDURE — 99281 EMR DPT VST MAYX REQ PHY/QHP: CPT | Mod: 25

## 2022-06-21 NOTE — ED PROVIDER NOTES
Encounter Date: 6/21/2022       History     Chief Complaint   Patient presents with    Head Injury     Loss balance and fell against corner of door and hit head last night. Per patient, he is on aspirin, the lump on his head went down but his roommate told him to come. Per patient, there was no LOC.     This 72 y/o man bent over to move a rug, lost his balance and fell over. He bumped his head on the flat edge of a door. Initially there was a lump but it is gone now. He had no loss of consciousness. He has had no neuro symptoms. He does take an aspirin daily. He states his room mate insisted he come because he had a mini stroke last year. He does not percieve any changes in his balance or speech.        Review of patient's allergies indicates:   Allergen Reactions    Latex Rash     Past Medical History:   Diagnosis Date    Arthritis     Bipolar disorder, unspecified     CHF (congestive heart failure)     Coronary artery disease     Diabetes mellitus     GERD (gastroesophageal reflux disease)     High cholesterol     Hx of eye surgery     Hypertension     ST elevation (STEMI) myocardial infarction of unspecified site     Stroke      Past Surgical History:   Procedure Laterality Date    CORONARY ARTERY BYPASS GRAFT      KNEE SURGERY       History reviewed. No pertinent family history.  Social History     Tobacco Use    Smoking status: Former Smoker    Smokeless tobacco: Never Used     Review of Systems   Constitutional: Negative for fever.   HENT: Negative for sore throat.    Respiratory: Negative for shortness of breath.    Cardiovascular: Negative for chest pain.   Gastrointestinal: Negative for nausea.   Genitourinary: Negative for dysuria.   Musculoskeletal: Negative for back pain.   Skin: Negative for rash.   Neurological: Negative for weakness.   Hematological: Does not bruise/bleed easily.       Physical Exam     Initial Vitals [06/21/22 1111]   BP Pulse Resp Temp SpO2   (!) 156/61 62 20 97.8  °F (36.6 °C) 98 %      MAP       --         Physical Exam    Constitutional: He appears well-developed and well-nourished.   HENT:   Head: Normocephalic and atraumatic.   Mouth/Throat: Mucous membranes are normal.   Eyes: EOM are normal. Pupils are equal, round, and reactive to light.   Neck: Neck supple.   Normal range of motion.  Cardiovascular: Normal rate, regular rhythm, normal heart sounds and intact distal pulses.   Pulmonary/Chest: Breath sounds normal.   Abdominal: Abdomen is soft. Bowel sounds are normal. There is no rebound.   Musculoskeletal:         General: Normal range of motion.      Cervical back: Normal range of motion and neck supple.     Neurological: He is alert and oriented to person, place, and time. He has normal strength.   Skin: Skin is warm and dry. Capillary refill takes less than 2 seconds.   Psychiatric: He has a normal mood and affect. His behavior is normal. Judgment and thought content normal.         ED Course   Procedures  Labs Reviewed - No data to display       Imaging Results    None          Medications - No data to display                       Clinical Impression:   Final diagnoses:  [Z13.9] Encounter for medical screening examination (Primary)          ED Disposition Condition    Discharge Stable        ED Prescriptions     None        Follow-up Information     Follow up With Specialties Details Why Contact Info    Magda Hernandez MD Family Medicine  As needed [address] [place] [id number]  [phone]             Chris Juarez MD  06/21/22 4749

## 2022-06-21 NOTE — ED NOTES
"Reports he reached over to get a towel, lost his balance, and fell yesterday, hitting the right side of head. Denies LOC or vomiting. Reports he does take an aspirin a day but does not take any blood thinners. States "I need a note saying I'm ok".  "

## 2022-10-19 ENCOUNTER — HOSPITAL ENCOUNTER (EMERGENCY)
Facility: HOSPITAL | Age: 72
Discharge: HOME | End: 2022-10-19
Attending: STUDENT IN AN ORGANIZED HEALTH CARE EDUCATION/TRAINING PROGRAM
Payer: MEDICARE

## 2022-10-19 VITALS
DIASTOLIC BLOOD PRESSURE: 85 MMHG | HEART RATE: 59 BPM | RESPIRATION RATE: 18 BRPM | HEIGHT: 69 IN | WEIGHT: 225 LBS | BODY MASS INDEX: 33.33 KG/M2 | OXYGEN SATURATION: 97 % | SYSTOLIC BLOOD PRESSURE: 145 MMHG | TEMPERATURE: 98 F

## 2022-10-19 DIAGNOSIS — U07.1 COVID-19: Primary | ICD-10-CM

## 2022-10-19 LAB
FLUAV AG UPPER RESP QL IA.RAPID: NOT DETECTED
FLUBV AG UPPER RESP QL IA.RAPID: NOT DETECTED
SARS-COV-2 RNA RESP QL NAA+PROBE: DETECTED

## 2022-10-19 PROCEDURE — 99283 EMERGENCY DEPT VISIT LOW MDM: CPT | Mod: 25

## 2022-10-19 PROCEDURE — 0240U COVID/FLU A&B PCR: CPT | Performed by: STUDENT IN AN ORGANIZED HEALTH CARE EDUCATION/TRAINING PROGRAM

## 2022-10-19 RX ORDER — AZITHROMYCIN 250 MG/1
TABLET, FILM COATED ORAL
Qty: 6 TABLET | Refills: 0 | Status: SHIPPED | OUTPATIENT
Start: 2022-10-19 | End: 2022-10-24

## 2022-10-21 NOTE — ED PROVIDER NOTES
Encounter Date: 10/19/2022       History     Chief Complaint   Patient presents with    sneezing     Pt reports been sneezing runny nose unable to taste states here for covid test      72 M c/o runny nose, sneezing, loss of taste x 3-4 days. Requests covid test, denies fever, chills, cp, sob, sore throat or any other symptoms    Review of patient's allergies indicates:   Allergen Reactions    Latex Rash     Past Medical History:   Diagnosis Date    Arthritis     Bipolar disorder, unspecified     CHF (congestive heart failure)     Coronary artery disease     Diabetes mellitus     GERD (gastroesophageal reflux disease)     High cholesterol     Hx of eye surgery     Hypertension     ST elevation (STEMI) myocardial infarction of unspecified site     Stroke      Past Surgical History:   Procedure Laterality Date    CORONARY ARTERY BYPASS GRAFT      KNEE SURGERY       History reviewed. No pertinent family history.  Social History     Tobacco Use    Smoking status: Former    Smokeless tobacco: Never     Review of Systems   HENT:  Positive for sneezing.    Respiratory:  Positive for cough.      Physical Exam     Initial Vitals [10/19/22 1323]   BP Pulse Resp Temp SpO2   (!) 145/85 (!) 59 18 97.8 °F (36.6 °C) 97 %      MAP       --         Physical Exam    Constitutional: He appears well-developed and well-nourished.   HENT:   Head: Normocephalic and atraumatic.   Eyes: Conjunctivae and EOM are normal. Pupils are equal, round, and reactive to light.   Neck: Neck supple.   Normal range of motion.  Cardiovascular:  Normal rate, regular rhythm, normal heart sounds and intact distal pulses.           No murmur heard.  Pulmonary/Chest: Breath sounds normal. No respiratory distress. He has no wheezes.   Abdominal: Abdomen is soft. Bowel sounds are normal. There is no abdominal tenderness.   Musculoskeletal:         General: Normal range of motion.      Cervical back: Normal range of motion and neck supple.     Neurological: He is  alert and oriented to person, place, and time. He has normal strength. No sensory deficit.   Skin: Skin is warm and dry. Capillary refill takes less than 2 seconds.   Psychiatric: He has a normal mood and affect.       ED Course   Procedures  Labs Reviewed   COVID/FLU A&B PCR - Abnormal; Notable for the following components:       Result Value    SARS-CoV-2 PCR Detected (*)     All other components within normal limits    Narrative:     The Xpert Xpress SARS-CoV-2/FLU/RSV plus is a rapid, multiplexed real-time PCR test intended for the simultaneous qualitative detection and differentiation of SARS-CoV-2, Influenza A, Influenza B, and respiratory syncytial virus (RSV) viral RNA in either nasopharyngeal swab or nasal swab specimens.                Imaging Results    None          Medications - No data to display                           Clinical Impression:   Final diagnoses:  [U07.1] COVID-19 (Primary)        ED Disposition Condition    Discharge Stable          ED Prescriptions       Medication Sig Dispense Start Date End Date Auth. Provider    azithromycin (Z-FREDDY) 250 MG tablet Take 2 tablets by mouth on day 1; Take 1 tablet by mouth on days 2-5 6 tablet 10/19/2022 10/24/2022 Cristy Norman MD          Follow-up Information       Follow up With Specialties Details Why Contact Info    Magda Hernandez MD Family Medicine In 1 week As needed [address] [facility] [id number]  [phone]               Cristy Norman MD  10/21/22 7358

## 2022-10-22 ENCOUNTER — NURSE TRIAGE (OUTPATIENT)
Dept: ADMINISTRATIVE | Facility: CLINIC | Age: 72
End: 2022-10-22
Payer: MEDICARE

## 2022-10-22 NOTE — TELEPHONE ENCOUNTER
Pts roommate calling, stating that she just needs to know if they can sleep in the same bed since he tested + for COVID and she did not. Advised of isolation away from each other. verbalized understanding. Denies any further questions or concerns at this time, advised to call back if they have any that come up. Advised pt to call back with any other concerns or worsening symptoms. Verbalized understanding and will route message to provider.     Reason for Disposition   COVID-19 Home Isolation, questions about    Protocols used: Coronavirus (COVID-19) Diagnosed or [id number]

## 2022-12-27 ENCOUNTER — LAB VISIT (OUTPATIENT)
Dept: LAB | Facility: HOSPITAL | Age: 72
End: 2022-12-27
Payer: MEDICAID

## 2022-12-27 DIAGNOSIS — G62.9 POLYNEUROPATHY: Primary | ICD-10-CM

## 2022-12-27 DIAGNOSIS — E78.49 FAMILIAL COMBINED HYPERLIPIDEMIA: ICD-10-CM

## 2022-12-27 DIAGNOSIS — Z13.29 SCREENING FOR THYROID DISORDER: ICD-10-CM

## 2022-12-27 DIAGNOSIS — E11.9 DIABETES MELLITUS: ICD-10-CM

## 2022-12-27 LAB
ALBUMIN SERPL-MCNC: 3.7 G/DL (ref 3.4–4.8)
ALBUMIN/GLOB SERPL: 0.9 RATIO (ref 1.1–2)
ALP SERPL-CCNC: 64 UNIT/L (ref 40–150)
ALT SERPL-CCNC: 40 UNIT/L (ref 0–55)
AST SERPL-CCNC: 28 UNIT/L (ref 5–34)
BASOPHILS # BLD AUTO: 0.04 X10(3)/MCL (ref 0–0.2)
BASOPHILS NFR BLD AUTO: 0.8 %
BILIRUBIN DIRECT+TOT PNL SERPL-MCNC: 0.7 MG/DL
BUN SERPL-MCNC: 15.5 MG/DL (ref 8.4–25.7)
CALCIUM SERPL-MCNC: 10 MG/DL (ref 8.8–10)
CHLORIDE SERPL-SCNC: 101 MMOL/L (ref 98–107)
CHOLEST SERPL-MCNC: 193 MG/DL
CHOLEST/HDLC SERPL: 7 {RATIO} (ref 0–5)
CO2 SERPL-SCNC: 27 MMOL/L (ref 23–31)
CREAT SERPL-MCNC: 0.94 MG/DL (ref 0.73–1.18)
CREAT UR-MCNC: 200.7 MG/DL (ref 63–166)
EOSINOPHIL # BLD AUTO: 0.24 X10(3)/MCL (ref 0–0.9)
EOSINOPHIL NFR BLD AUTO: 4.6 %
ERYTHROCYTE [DISTWIDTH] IN BLOOD BY AUTOMATED COUNT: 12.9 % (ref 11.6–14.4)
EST. AVERAGE GLUCOSE BLD GHB EST-MCNC: 243.2 MG/DL
GFR SERPLBLD CREATININE-BSD FMLA CKD-EPI: >60 MLS/MIN/1.73/M2
GLOBULIN SER-MCNC: 3.9 GM/DL (ref 2.4–3.5)
GLUCOSE SERPL-MCNC: 266 MG/DL (ref 82–115)
HBA1C MFR BLD: 10.1 %
HCT VFR BLD AUTO: 46.4 % (ref 42–52)
HDLC SERPL-MCNC: 29 MG/DL (ref 35–60)
HGB BLD-MCNC: 16.3 GM/DL (ref 14–18)
IMM GRANULOCYTES # BLD AUTO: 0 X10(3)/MCL (ref 0–0.04)
IMM GRANULOCYTES NFR BLD AUTO: 0 %
LDLC SERPL CALC-MCNC: 50 MG/DL (ref 50–140)
LYMPHOCYTES # BLD AUTO: 2.57 X10(3)/MCL (ref 0.6–4.6)
LYMPHOCYTES NFR BLD AUTO: 49.2 %
MCH RBC QN AUTO: 30.5 PG
MCHC RBC AUTO-ENTMCNC: 35.1 MG/DL (ref 33–36)
MCV RBC AUTO: 86.9 FL (ref 80–94)
MICROALBUMIN UR-MCNC: 250 UG/ML
MICROALBUMIN/CREAT RATIO PNL UR: 124.6 MG/GM CR (ref 0–30)
MONOCYTES # BLD AUTO: 0.53 X10(3)/MCL (ref 0.1–1.3)
MONOCYTES NFR BLD AUTO: 10.2 %
NEUTROPHILS # BLD AUTO: 1.84 X10(3)/MCL (ref 2.1–9.2)
NEUTROPHILS NFR BLD AUTO: 35.2 %
PLATELET # BLD AUTO: 143 X10(3)/MCL (ref 140–371)
PMV BLD AUTO: 11.1 FL (ref 9.4–12.4)
POTASSIUM SERPL-SCNC: 4.3 MMOL/L (ref 3.5–5.1)
PROT SERPL-MCNC: 7.6 GM/DL (ref 5.8–7.6)
RBC # BLD AUTO: 5.34 X10(6)/MCL (ref 4.7–6.1)
SODIUM SERPL-SCNC: 136 MMOL/L (ref 136–145)
TRIGL SERPL-MCNC: 568 MG/DL (ref 34–140)
TSH SERPL-ACNC: 1.32 UIU/ML (ref 0.35–4.94)
VIT B12 SERPL-MCNC: 822 PG/ML (ref 213–816)
VLDLC SERPL CALC-MCNC: 114 MG/DL
WBC # SPEC AUTO: 5.2 X10(3)/MCL (ref 4.5–11.5)

## 2022-12-27 PROCEDURE — 36415 COLL VENOUS BLD VENIPUNCTURE: CPT

## 2022-12-27 PROCEDURE — 84443 ASSAY THYROID STIM HORMONE: CPT

## 2022-12-27 PROCEDURE — 80061 LIPID PANEL: CPT

## 2022-12-27 PROCEDURE — 83036 HEMOGLOBIN GLYCOSYLATED A1C: CPT

## 2022-12-27 PROCEDURE — 85025 COMPLETE CBC W/AUTO DIFF WBC: CPT

## 2022-12-27 PROCEDURE — 82043 UR ALBUMIN QUANTITATIVE: CPT

## 2022-12-27 PROCEDURE — 82607 VITAMIN B-12: CPT

## 2022-12-27 PROCEDURE — 80053 COMPREHEN METABOLIC PANEL: CPT

## 2023-03-08 ENCOUNTER — HOSPITAL ENCOUNTER (INPATIENT)
Facility: HOSPITAL | Age: 73
LOS: 3 days | Discharge: HOME | DRG: 638 | End: 2023-03-11
Attending: EMERGENCY MEDICINE | Admitting: INTERNAL MEDICINE
Payer: MEDICARE

## 2023-03-08 DIAGNOSIS — E87.20 LACTIC ACIDOSIS: ICD-10-CM

## 2023-03-08 DIAGNOSIS — E11.00 HYPEROSMOLAR HYPERGLYCEMIC STATE (HHS): Primary | ICD-10-CM

## 2023-03-08 DIAGNOSIS — I49.9 ABNORMAL HEART RHYTHM: ICD-10-CM

## 2023-03-08 DIAGNOSIS — R53.83 FATIGUE, UNSPECIFIED TYPE: ICD-10-CM

## 2023-03-08 DIAGNOSIS — R41.82 ALTERED MENTAL STATUS: ICD-10-CM

## 2023-03-08 DIAGNOSIS — K92.1 MELENA: ICD-10-CM

## 2023-03-08 DIAGNOSIS — R41.82 AMS (ALTERED MENTAL STATUS): ICD-10-CM

## 2023-03-08 DIAGNOSIS — B37.9 YEAST INFECTION: ICD-10-CM

## 2023-03-08 DIAGNOSIS — N17.9 AKI (ACUTE KIDNEY INJURY): ICD-10-CM

## 2023-03-08 DIAGNOSIS — R73.9 ACUTE HYPERGLYCEMIA: ICD-10-CM

## 2023-03-08 LAB
ABORH RETYPE: NORMAL
ALBUMIN SERPL-MCNC: 3.8 G/DL (ref 3.4–4.8)
ALBUMIN/GLOB SERPL: 0.9 RATIO (ref 1.1–2)
ALP SERPL-CCNC: 96 UNIT/L (ref 40–150)
ALT SERPL-CCNC: 29 UNIT/L (ref 0–55)
AMPHET UR QL SCN: NEGATIVE
ANION GAP SERPL CALC-SCNC: 12 MEQ/L
APPEARANCE UR: CLEAR
AST SERPL-CCNC: 15 UNIT/L (ref 5–34)
B-OH-BUTYR SERPL-MCNC: 0.8 MMOL/L
BACTERIA #/AREA URNS AUTO: NORMAL /HPF
BARBITURATE SCN PRESENT UR: NEGATIVE
BASE EXCESS ARTERIAL: NORMAL
BASOPHILS # BLD AUTO: 0.05 X10(3)/MCL (ref 0–0.2)
BASOPHILS NFR BLD AUTO: 0.6 %
BENZODIAZ UR QL SCN: NEGATIVE
BILIRUB UR QL STRIP.AUTO: NEGATIVE MG/DL
BILIRUBIN DIRECT+TOT PNL SERPL-MCNC: 0.9 MG/DL
BUN SERPL-MCNC: 14 MG/DL (ref 8.4–25.7)
BUN SERPL-MCNC: 18.4 MG/DL (ref 8.4–25.7)
CALCIUM SERPL-MCNC: 10.5 MG/DL (ref 8.8–10)
CALCIUM SERPL-MCNC: 9.9 MG/DL (ref 8.8–10)
CANNABINOIDS UR QL SCN: NEGATIVE
CHLORIDE SERPL-SCNC: 106 MMOL/L (ref 98–107)
CHLORIDE SERPL-SCNC: 89 MMOL/L (ref 98–107)
CO2 SERPL-SCNC: 25 MMOL/L (ref 23–31)
CO2 SERPL-SCNC: 28 MMOL/L (ref 23–31)
COCAINE UR QL SCN: NEGATIVE
COLOR UR AUTO: YELLOW
CORRECTED TEMPERATURE (PCO2): 31 MMHG (ref 35–45)
CORRECTED TEMPERATURE (PH): 7.52 (ref 7.35–7.45)
CORRECTED TEMPERATURE (PO2): 106 MMHG (ref 80–100)
CREAT SERPL-MCNC: 1.07 MG/DL (ref 0.73–1.18)
CREAT SERPL-MCNC: 1.76 MG/DL (ref 0.73–1.18)
CREAT/UREA NIT SERPL: 13
EOSINOPHIL # BLD AUTO: 0.02 X10(3)/MCL (ref 0–0.9)
EOSINOPHIL NFR BLD AUTO: 0.2 %
ERYTHROCYTE [DISTWIDTH] IN BLOOD BY AUTOMATED COUNT: 12.5 % (ref 11.5–17)
ETHANOL SERPL-MCNC: <10 MG/DL
FENTANYL UR QL SCN: NEGATIVE
FLUAV AG UPPER RESP QL IA.RAPID: NOT DETECTED
FLUBV AG UPPER RESP QL IA.RAPID: NOT DETECTED
GFR SERPLBLD CREATININE-BSD FMLA CKD-EPI: 40 MLS/MIN/1.73/M2
GFR SERPLBLD CREATININE-BSD FMLA CKD-EPI: >60 MLS/MIN/1.73/M2
GLOBULIN SER-MCNC: 4.3 GM/DL (ref 2.4–3.5)
GLUCOSE SERPL-MCNC: 1041 MG/DL (ref 82–115)
GLUCOSE SERPL-MCNC: 162 MG/DL (ref 82–115)
GLUCOSE UR QL STRIP.AUTO: ABNORMAL MG/DL
GROUP & RH: NORMAL
HCO3 ARTERIAL: NORMAL
HCO3 UR-SCNC: 25.3 MMOL/L (ref 22–26)
HCT VFR BLD AUTO: 49.6 % (ref 42–52)
HGB BLD-MCNC: 17.3 G/DL (ref 14–18)
HGB BLD-MCNC: 17.7 G/DL (ref 12–16)
HGB BLD-MCNC: NORMAL G/DL
IMM GRANULOCYTES # BLD AUTO: 0.02 X10(3)/MCL (ref 0–0.04)
IMM GRANULOCYTES NFR BLD AUTO: 0.2 %
INDIRECT COOMBS GEL: NORMAL
KETONES UR QL STRIP.AUTO: NEGATIVE MG/DL
LACTATE SERPL-SCNC: 2.8 MMOL/L (ref 0.5–2.2)
LACTATE SERPL-SCNC: 3.2 MMOL/L (ref 0.5–2.2)
LACTATE SERPL-SCNC: 3.5 MMOL/L (ref 0.5–2.2)
LACTATE SERPL-SCNC: 3.6 MMOL/L (ref 0.5–2.2)
LEUKOCYTE ESTERASE UR QL STRIP.AUTO: NEGATIVE UNIT/L
LYMPHOCYTES # BLD AUTO: 1.38 X10(3)/MCL (ref 0.6–4.6)
LYMPHOCYTES NFR BLD AUTO: 16.3 %
MAGNESIUM SERPL-MCNC: 2.3 MG/DL (ref 1.6–2.6)
MAGNESIUM SERPL-MCNC: 2.5 MG/DL (ref 1.6–2.6)
MCH RBC QN AUTO: 30.1 PG
MCHC RBC AUTO-ENTMCNC: 34.9 G/DL (ref 33–36)
MCV RBC AUTO: 86.4 FL (ref 80–94)
MDMA UR QL SCN: NEGATIVE
MONOCYTES # BLD AUTO: 0.7 X10(3)/MCL (ref 0.1–1.3)
MONOCYTES NFR BLD AUTO: 8.2 %
NEUTROPHILS # BLD AUTO: 6.32 X10(3)/MCL (ref 2.1–9.2)
NEUTROPHILS NFR BLD AUTO: 74.5 %
NITRITE UR QL STRIP.AUTO: NEGATIVE
NRBC BLD AUTO-RTO: 0 %
OPIATES UR QL SCN: NEGATIVE
OSMOLALITY SERPL: 346 MOSM/KG (ref 280–300)
PCO2 BLDA: 31 MMHG (ref 35–45)
PCO2 BLDA: NORMAL MM[HG]
PCO2 BLDA: NORMAL MM[HG]
PCP UR QL: NEGATIVE
PH SMN: 7.52 [PH] (ref 7.35–7.45)
PH SMN: NORMAL [PH]
PH UR STRIP.AUTO: 7.5 [PH]
PH UR: 7.5 [PH] (ref 3–11)
PHOSPHATE SERPL-MCNC: 2.9 MG/DL (ref 2.3–4.7)
PLATELET # BLD AUTO: 160 X10(3)/MCL (ref 130–400)
PMV BLD AUTO: 12 FL (ref 7.4–10.4)
PO2 BLDA: 106 MMHG (ref 80–100)
PO2 BLDA: NORMAL MM[HG]
POC BASE DEFICIT: 3.4 MMOL/L (ref -2–2)
POC COHB: 2.8 %
POC COHB: NORMAL
POC FIO2: NORMAL
POC IONIZED CALCIUM: 1.13 MMOL/L (ref 1.12–1.23)
POC IONIZED CALCIUM: NORMAL
POC METHB: 1.7 % (ref 0.4–1.5)
POC METHB: NORMAL
POC O2HB: 95 % (ref 94–97)
POC O2HB: NORMAL
POC SATURATED O2: 98.6 %
POC TEMPERATURE: 37 °C
POCT GLUCOSE: 115 MG/DL (ref 70–110)
POCT GLUCOSE: 140 MG/DL (ref 70–110)
POCT GLUCOSE: 258 MG/DL (ref 70–110)
POCT GLUCOSE: 93 MG/DL (ref 70–110)
POCT GLUCOSE: 98 MG/DL (ref 70–110)
POCT GLUCOSE: >500 MG/DL (ref 70–110)
POTASSIUM BLD-SCNC: 5 MMOL/L (ref 3.5–5)
POTASSIUM BLD-SCNC: NORMAL MMOL/L
POTASSIUM SERPL-SCNC: 3.4 MMOL/L (ref 3.5–5.1)
POTASSIUM SERPL-SCNC: 5.6 MMOL/L (ref 3.5–5.1)
PREALB SERPL-MCNC: 15.8 MG/DL (ref 16–42)
PROT SERPL-MCNC: 8.1 GM/DL (ref 5.8–7.6)
PROT UR QL STRIP.AUTO: NEGATIVE MG/DL
RBC # BLD AUTO: 5.74 X10(6)/MCL (ref 4.7–6.1)
RBC #/AREA URNS AUTO: <5 /HPF
RBC UR QL AUTO: NEGATIVE UNIT/L
SARS-COV-2 RNA RESP QL NAA+PROBE: NOT DETECTED
SATURATED O2 ARTERIAL, I-STAT: NORMAL
SODIUM BLD-SCNC: 124 MMOL/L (ref 137–145)
SODIUM BLD-SCNC: NORMAL MMOL/L
SODIUM SERPL-SCNC: 129 MMOL/L (ref 136–145)
SODIUM SERPL-SCNC: 146 MMOL/L (ref 136–145)
SP GR UR STRIP.AUTO: 1.03 (ref 1–1.03)
SPECIFIC GRAVITY, URINE AUTO (.000) (OHS): 1.03 (ref 1–1.03)
SPECIMEN SOURCE: ABNORMAL
SQUAMOUS #/AREA URNS AUTO: <5 /HPF
TROPONIN I SERPL-MCNC: 0.01 NG/ML (ref 0–0.04)
UROBILINOGEN UR STRIP-ACNC: 0.2 MG/DL
WBC # SPEC AUTO: 8.5 X10(3)/MCL (ref 4.5–11.5)
WBC #/AREA URNS AUTO: <5 /HPF

## 2023-03-08 PROCEDURE — 83735 ASSAY OF MAGNESIUM: CPT | Performed by: STUDENT IN AN ORGANIZED HEALTH CARE EDUCATION/TRAINING PROGRAM

## 2023-03-08 PROCEDURE — 86923 COMPATIBILITY TEST ELECTRIC: CPT | Performed by: EMERGENCY MEDICINE

## 2023-03-08 PROCEDURE — 83605 ASSAY OF LACTIC ACID: CPT | Performed by: EMERGENCY MEDICINE

## 2023-03-08 PROCEDURE — 83735 ASSAY OF MAGNESIUM: CPT | Performed by: EMERGENCY MEDICINE

## 2023-03-08 PROCEDURE — 85025 COMPLETE CBC W/AUTO DIFF WBC: CPT | Performed by: EMERGENCY MEDICINE

## 2023-03-08 PROCEDURE — 63600175 PHARM REV CODE 636 W HCPCS: Performed by: EMERGENCY MEDICINE

## 2023-03-08 PROCEDURE — 82077 ASSAY SPEC XCP UR&BREATH IA: CPT | Performed by: EMERGENCY MEDICINE

## 2023-03-08 PROCEDURE — 25000003 PHARM REV CODE 250: Performed by: EMERGENCY MEDICINE

## 2023-03-08 PROCEDURE — 93010 ELECTROCARDIOGRAM REPORT: CPT | Mod: ,,, | Performed by: INTERNAL MEDICINE

## 2023-03-08 PROCEDURE — 87040 BLOOD CULTURE FOR BACTERIA: CPT | Performed by: EMERGENCY MEDICINE

## 2023-03-08 PROCEDURE — 20000000 HC ICU ROOM

## 2023-03-08 PROCEDURE — C9113 INJ PANTOPRAZOLE SODIUM, VIA: HCPCS | Performed by: EMERGENCY MEDICINE

## 2023-03-08 PROCEDURE — 96375 TX/PRO/DX INJ NEW DRUG ADDON: CPT

## 2023-03-08 PROCEDURE — 96374 THER/PROPH/DIAG INJ IV PUSH: CPT

## 2023-03-08 PROCEDURE — 96361 HYDRATE IV INFUSION ADD-ON: CPT

## 2023-03-08 PROCEDURE — 84484 ASSAY OF TROPONIN QUANT: CPT | Performed by: EMERGENCY MEDICINE

## 2023-03-08 PROCEDURE — 0240U COVID/FLU A&B PCR: CPT | Performed by: EMERGENCY MEDICINE

## 2023-03-08 PROCEDURE — 63600175 PHARM REV CODE 636 W HCPCS: Performed by: STUDENT IN AN ORGANIZED HEALTH CARE EDUCATION/TRAINING PROGRAM

## 2023-03-08 PROCEDURE — 80053 COMPREHEN METABOLIC PANEL: CPT | Performed by: EMERGENCY MEDICINE

## 2023-03-08 PROCEDURE — 83930 ASSAY OF BLOOD OSMOLALITY: CPT | Performed by: EMERGENCY MEDICINE

## 2023-03-08 PROCEDURE — 82962 GLUCOSE BLOOD TEST: CPT

## 2023-03-08 PROCEDURE — 93005 ELECTROCARDIOGRAM TRACING: CPT

## 2023-03-08 PROCEDURE — 80307 DRUG TEST PRSMV CHEM ANLYZR: CPT | Performed by: EMERGENCY MEDICINE

## 2023-03-08 PROCEDURE — 87103 BLOOD FUNGUS CULTURE: CPT | Performed by: EMERGENCY MEDICINE

## 2023-03-08 PROCEDURE — 82010 KETONE BODYS QUAN: CPT | Performed by: EMERGENCY MEDICINE

## 2023-03-08 PROCEDURE — 99285 EMERGENCY DEPT VISIT HI MDM: CPT | Mod: 25

## 2023-03-08 PROCEDURE — 86900 BLOOD TYPING SEROLOGIC ABO: CPT | Performed by: EMERGENCY MEDICINE

## 2023-03-08 PROCEDURE — 84100 ASSAY OF PHOSPHORUS: CPT | Performed by: STUDENT IN AN ORGANIZED HEALTH CARE EDUCATION/TRAINING PROGRAM

## 2023-03-08 PROCEDURE — 96372 THER/PROPH/DIAG INJ SC/IM: CPT | Performed by: EMERGENCY MEDICINE

## 2023-03-08 PROCEDURE — 82803 BLOOD GASES ANY COMBINATION: CPT

## 2023-03-08 PROCEDURE — 84134 ASSAY OF PREALBUMIN: CPT | Performed by: STUDENT IN AN ORGANIZED HEALTH CARE EDUCATION/TRAINING PROGRAM

## 2023-03-08 PROCEDURE — 36600 WITHDRAWAL OF ARTERIAL BLOOD: CPT

## 2023-03-08 PROCEDURE — 81001 URINALYSIS AUTO W/SCOPE: CPT | Performed by: EMERGENCY MEDICINE

## 2023-03-08 PROCEDURE — 93010 EKG 12-LEAD: ICD-10-PCS | Mod: ,,, | Performed by: INTERNAL MEDICINE

## 2023-03-08 PROCEDURE — 63600175 PHARM REV CODE 636 W HCPCS: Performed by: INTERNAL MEDICINE

## 2023-03-08 RX ORDER — NEBIVOLOL 5 MG/1
7.5 TABLET ORAL DAILY
Status: ON HOLD | COMMUNITY
Start: 2023-01-11 | End: 2023-08-28 | Stop reason: HOSPADM

## 2023-03-08 RX ORDER — PANTOPRAZOLE SODIUM 40 MG/1
40 TABLET, DELAYED RELEASE ORAL
Status: DISCONTINUED | OUTPATIENT
Start: 2023-03-09 | End: 2023-03-11 | Stop reason: HOSPADM

## 2023-03-08 RX ORDER — GLUCAGON 1 MG
1 KIT INJECTION
Status: DISCONTINUED | OUTPATIENT
Start: 2023-03-08 | End: 2023-03-11 | Stop reason: HOSPADM

## 2023-03-08 RX ORDER — INSULIN GLARGINE 100 [IU]/ML
77 INJECTION, SOLUTION SUBCUTANEOUS
Status: ON HOLD | COMMUNITY
Start: 2023-01-27 | End: 2023-08-28 | Stop reason: HOSPADM

## 2023-03-08 RX ORDER — GLUCAGON 1 MG
1 KIT INJECTION
Status: DISCONTINUED | OUTPATIENT
Start: 2023-03-08 | End: 2023-03-08

## 2023-03-08 RX ORDER — IBUPROFEN 200 MG
30 TABLET ORAL
Status: DISCONTINUED | OUTPATIENT
Start: 2023-03-08 | End: 2023-03-11 | Stop reason: HOSPADM

## 2023-03-08 RX ORDER — COLESEVELAM 180 1/1
1875 TABLET ORAL
Status: ON HOLD | COMMUNITY
End: 2023-08-28 | Stop reason: HOSPADM

## 2023-03-08 RX ORDER — INSULIN ASPART 100 [IU]/ML
0-5 INJECTION, SOLUTION INTRAVENOUS; SUBCUTANEOUS EVERY 4 HOURS PRN
Status: DISCONTINUED | OUTPATIENT
Start: 2023-03-08 | End: 2023-03-08

## 2023-03-08 RX ORDER — POTASSIUM CHLORIDE 7.45 MG/ML
80 INJECTION INTRAVENOUS
Status: DISCONTINUED | OUTPATIENT
Start: 2023-03-08 | End: 2023-03-08

## 2023-03-08 RX ORDER — DOXYLAMINE SUCCINATE 25 MG
TABLET ORAL 2 TIMES DAILY
Status: DISCONTINUED | OUTPATIENT
Start: 2023-03-08 | End: 2023-03-09

## 2023-03-08 RX ORDER — CALCIUM GLUCONATE 20 MG/ML
1 INJECTION, SOLUTION INTRAVENOUS
Status: DISCONTINUED | OUTPATIENT
Start: 2023-03-08 | End: 2023-03-11 | Stop reason: HOSPADM

## 2023-03-08 RX ORDER — POTASSIUM CHLORIDE 14.9 MG/ML
40 INJECTION INTRAVENOUS
Status: DISCONTINUED | OUTPATIENT
Start: 2023-03-08 | End: 2023-03-11 | Stop reason: HOSPADM

## 2023-03-08 RX ORDER — POTASSIUM CHLORIDE 7.45 MG/ML
60 INJECTION INTRAVENOUS
Status: DISCONTINUED | OUTPATIENT
Start: 2023-03-08 | End: 2023-03-08

## 2023-03-08 RX ORDER — SODIUM CHLORIDE 9 MG/ML
1000 INJECTION, SOLUTION INTRAVENOUS CONTINUOUS
Status: DISCONTINUED | OUTPATIENT
Start: 2023-03-08 | End: 2023-03-08

## 2023-03-08 RX ORDER — FENOFIBRATE 130 MG/1
130 CAPSULE ORAL
COMMUNITY
Start: 2022-07-20

## 2023-03-08 RX ORDER — SODIUM CHLORIDE 0.9 % (FLUSH) 0.9 %
10 SYRINGE (ML) INJECTION
Status: DISCONTINUED | OUTPATIENT
Start: 2023-03-08 | End: 2023-03-11 | Stop reason: HOSPADM

## 2023-03-08 RX ORDER — INSULIN ASPART 100 [IU]/ML
1-10 INJECTION, SOLUTION INTRAVENOUS; SUBCUTANEOUS EVERY 6 HOURS PRN
Status: DISCONTINUED | OUTPATIENT
Start: 2023-03-09 | End: 2023-03-11 | Stop reason: HOSPADM

## 2023-03-08 RX ORDER — MAGNESIUM SULFATE HEPTAHYDRATE 40 MG/ML
4 INJECTION, SOLUTION INTRAVENOUS
Status: DISCONTINUED | OUTPATIENT
Start: 2023-03-08 | End: 2023-03-11 | Stop reason: HOSPADM

## 2023-03-08 RX ORDER — SODIUM CHLORIDE 9 MG/ML
INJECTION, SOLUTION INTRAVENOUS CONTINUOUS
Status: ACTIVE | OUTPATIENT
Start: 2023-03-08 | End: 2023-03-09

## 2023-03-08 RX ORDER — UBIDECARENONE 75 MG
500 CAPSULE ORAL
Status: ON HOLD | COMMUNITY
End: 2023-08-28 | Stop reason: HOSPADM

## 2023-03-08 RX ORDER — POTASSIUM CHLORIDE 14.9 MG/ML
60 INJECTION INTRAVENOUS
Status: DISCONTINUED | OUTPATIENT
Start: 2023-03-08 | End: 2023-03-11 | Stop reason: HOSPADM

## 2023-03-08 RX ORDER — LIRAGLUTIDE 6 MG/ML
1.8 INJECTION SUBCUTANEOUS DAILY
Status: ON HOLD | COMMUNITY
Start: 2023-02-07 | End: 2023-08-28 | Stop reason: HOSPADM

## 2023-03-08 RX ORDER — POTASSIUM CHLORIDE 7.45 MG/ML
40 INJECTION INTRAVENOUS
Status: DISCONTINUED | OUTPATIENT
Start: 2023-03-08 | End: 2023-03-08

## 2023-03-08 RX ORDER — INSULIN ASPART 100 [IU]/ML
0-5 INJECTION, SOLUTION INTRAVENOUS; SUBCUTANEOUS EVERY 6 HOURS PRN
Status: DISCONTINUED | OUTPATIENT
Start: 2023-03-08 | End: 2023-03-08

## 2023-03-08 RX ORDER — INSULIN ASPART 100 [IU]/ML
0-5 INJECTION, SOLUTION INTRAVENOUS; SUBCUTANEOUS
Status: DISCONTINUED | OUTPATIENT
Start: 2023-03-08 | End: 2023-03-08

## 2023-03-08 RX ORDER — POTASSIUM CHLORIDE 14.9 MG/ML
80 INJECTION INTRAVENOUS
Status: DISCONTINUED | OUTPATIENT
Start: 2023-03-08 | End: 2023-03-11 | Stop reason: HOSPADM

## 2023-03-08 RX ORDER — LANOLIN ALCOHOL/MO/W.PET/CERES
1 CREAM (GRAM) TOPICAL
COMMUNITY
Start: 2022-12-09

## 2023-03-08 RX ORDER — ASPIRIN 81 MG/1
162 TABLET ORAL DAILY
Status: ON HOLD | COMMUNITY
End: 2023-08-28 | Stop reason: HOSPADM

## 2023-03-08 RX ORDER — MAGNESIUM SULFATE HEPTAHYDRATE 40 MG/ML
2 INJECTION, SOLUTION INTRAVENOUS
Status: DISCONTINUED | OUTPATIENT
Start: 2023-03-08 | End: 2023-03-11 | Stop reason: HOSPADM

## 2023-03-08 RX ORDER — CALCIUM GLUCONATE 20 MG/ML
3 INJECTION, SOLUTION INTRAVENOUS
Status: DISCONTINUED | OUTPATIENT
Start: 2023-03-08 | End: 2023-03-11 | Stop reason: HOSPADM

## 2023-03-08 RX ORDER — SODIUM CHLORIDE AND POTASSIUM CHLORIDE 150; 900 MG/100ML; MG/100ML
INJECTION, SOLUTION INTRAVENOUS CONTINUOUS PRN
Status: DISCONTINUED | OUTPATIENT
Start: 2023-03-08 | End: 2023-03-08

## 2023-03-08 RX ORDER — PANTOPRAZOLE SODIUM 40 MG/10ML
80 INJECTION, POWDER, LYOPHILIZED, FOR SOLUTION INTRAVENOUS
Status: COMPLETED | OUTPATIENT
Start: 2023-03-08 | End: 2023-03-08

## 2023-03-08 RX ORDER — HEPARIN SODIUM 5000 [USP'U]/ML
5000 INJECTION, SOLUTION INTRAVENOUS; SUBCUTANEOUS EVERY 12 HOURS
Status: DISCONTINUED | OUTPATIENT
Start: 2023-03-08 | End: 2023-03-11 | Stop reason: HOSPADM

## 2023-03-08 RX ORDER — IBUPROFEN 200 MG
15 TABLET ORAL
Status: DISCONTINUED | OUTPATIENT
Start: 2023-03-08 | End: 2023-03-11 | Stop reason: HOSPADM

## 2023-03-08 RX ORDER — CALCIUM GLUCONATE 20 MG/ML
2 INJECTION, SOLUTION INTRAVENOUS
Status: DISCONTINUED | OUTPATIENT
Start: 2023-03-08 | End: 2023-03-11 | Stop reason: HOSPADM

## 2023-03-08 RX ORDER — DEXTROSE MONOHYDRATE, SODIUM CHLORIDE, AND POTASSIUM CHLORIDE 50; 1.49; 4.5 G/1000ML; G/1000ML; G/1000ML
INJECTION, SOLUTION INTRAVENOUS CONTINUOUS PRN
Status: DISCONTINUED | OUTPATIENT
Start: 2023-03-08 | End: 2023-03-09

## 2023-03-08 RX ORDER — EVOLOCUMAB 420 MG/3.5
420 KIT SUBCUTANEOUS
COMMUNITY
Start: 2022-04-30

## 2023-03-08 RX ORDER — TRAZODONE HYDROCHLORIDE 50 MG/1
25 TABLET ORAL NIGHTLY
COMMUNITY
Start: 2022-10-03 | End: 2023-11-07 | Stop reason: SDUPTHER

## 2023-03-08 RX ORDER — HYDROCODONE BITARTRATE AND ACETAMINOPHEN 500; 5 MG/1; MG/1
TABLET ORAL
Status: DISCONTINUED | OUTPATIENT
Start: 2023-03-08 | End: 2023-03-11 | Stop reason: HOSPADM

## 2023-03-08 RX ORDER — SODIUM CHLORIDE AND POTASSIUM CHLORIDE 150; 450 MG/100ML; MG/100ML
150 INJECTION, SOLUTION INTRAVENOUS CONTINUOUS PRN
Status: DISCONTINUED | OUTPATIENT
Start: 2023-03-08 | End: 2023-03-08

## 2023-03-08 RX ADMIN — SODIUM CHLORIDE, POTASSIUM CHLORIDE, SODIUM LACTATE AND CALCIUM CHLORIDE 1000 ML: 600; 310; 30; 20 INJECTION, SOLUTION INTRAVENOUS at 09:03

## 2023-03-08 RX ADMIN — HEPARIN SODIUM 5000 UNITS: 5000 INJECTION, SOLUTION INTRAVENOUS; SUBCUTANEOUS at 09:03

## 2023-03-08 RX ADMIN — POTASSIUM CHLORIDE 60 MEQ: 14.9 INJECTION, SOLUTION INTRAVENOUS at 09:03

## 2023-03-08 RX ADMIN — INSULIN HUMAN 0.1 UNITS/KG/HR: 1 INJECTION, SOLUTION INTRAVENOUS at 04:03

## 2023-03-08 RX ADMIN — PANTOPRAZOLE SODIUM 80 MG: 40 INJECTION, POWDER, FOR SOLUTION INTRAVENOUS at 02:03

## 2023-03-08 RX ADMIN — MICONAZOLE NITRATE: 20 CREAM TOPICAL at 09:03

## 2023-03-08 RX ADMIN — POTASSIUM CHLORIDE, DEXTROSE MONOHYDRATE AND SODIUM CHLORIDE: 150; 5; 450 INJECTION, SOLUTION INTRAVENOUS at 10:03

## 2023-03-08 RX ADMIN — INSULIN HUMAN 10 UNITS: 100 INJECTION, SOLUTION PARENTERAL at 02:03

## 2023-03-08 RX ADMIN — SODIUM CHLORIDE 1000 ML: 9 INJECTION, SOLUTION INTRAVENOUS at 05:03

## 2023-03-08 RX ADMIN — SODIUM CHLORIDE 2000 ML: 9 INJECTION, SOLUTION INTRAVENOUS at 02:03

## 2023-03-08 NOTE — ED PROVIDER NOTES
Encounter Date: 3/8/2023    SCRIBE #1 NOTE: I, Radha Branham, am scribing for, and in the presence of,  Elian Bennett MD. I have scribed the following portions of the note - Other sections scribed: HPI, ROS, PE.     History     Chief Complaint   Patient presents with    Fatigue     Pt c/o generalized weakness and bright red blood in stool for several weeks. CBG >500     72 year old male w/ hx of bipolar disorder, CHF, CAD w/ CABG, DM, HLD, HTN, STEMI, and CVA presents to ED from home for generalized weakness. Per EMS, pt has had generalized weakness and bright red blood in his stool for several weeks. EMS reports CBG >500 en route. EMS states the pt lives at home with his wife. Pt denies any chest pain or alcohol use.     The history is provided by the EMS personnel and the patient. No  was used.   Fatigue  The current episode started more than 1 week ago.   Review of patient's allergies indicates:   Allergen Reactions    Latex Rash     Past Medical History:   Diagnosis Date    Arthritis     Bipolar disorder, unspecified     CHF (congestive heart failure)     Coronary artery disease     Diabetes mellitus     GERD (gastroesophageal reflux disease)     High cholesterol     Hx of eye surgery     Hypertension     ST elevation (STEMI) myocardial infarction of unspecified site     Stroke      Past Surgical History:   Procedure Laterality Date    CORONARY ARTERY BYPASS GRAFT      KNEE SURGERY       No family history on file.  Social History     Tobacco Use    Smoking status: Former    Smokeless tobacco: Never     Review of Systems   Unable to perform ROS: Mental status change   Constitutional:  Positive for fatigue.     Physical Exam     Initial Vitals [03/08/23 1056]   BP Pulse Resp Temp SpO2   (!) 152/94 76 20 98.4 °F (36.9 °C) 95 %      MAP       --         Physical Exam    Nursing note and vitals reviewed.  Constitutional: He appears well-developed and well-nourished. No distress.   Pt is  disheveled. Dry stool and urine on pants.    HENT:   Head: Normocephalic and atraumatic.   Eyes: Conjunctivae are normal.   Cardiovascular:  Normal rate.           Pulmonary/Chest: No respiratory distress. He has no decreased breath sounds. He has no wheezes. He has no rhonchi.   Lungs are clear.   Abdominal: Abdomen is soft. There is no abdominal tenderness. There is no rebound and no guarding.   Genitourinary: Rectum:      Guaiac result positive.   Guaiac positive stool. : Acceptable.   Genitourinary Comments: Brown stool, hemoccult positive.     Musculoskeletal:         General: Normal range of motion.      Right lower leg: No edema.      Left lower leg: No edema.     Neurological: He is alert. He has normal strength.   Pt is alert, makes good eye contact, exhibits some confusion.   Skin: Skin is warm and dry.   Psychiatric: He has a normal mood and affect.       ED Course   Critical Care    Date/Time: 3/8/2023 2:21 PM  Performed by: Elian Bennett MD  Authorized by: Elian Bennett MD   Direct patient critical care time: 25 minutes  Additional history critical care time: 3 minutes  Ordering / reviewing critical care time: 6 minutes  Documentation critical care time: 14 minutes  Total critical care time (exclusive of procedural time) : 48 minutes      Labs Reviewed   COMPREHENSIVE METABOLIC PANEL - Abnormal; Notable for the following components:       Result Value    Sodium Level 129 (*)     Potassium Level 5.6 (*)     Chloride 89 (*)     Glucose Level 1,041 (*)     Creatinine 1.76 (*)     Calcium Level Total 10.5 (*)     Protein Total 8.1 (*)     Globulin 4.3 (*)     Albumin/Globulin Ratio 0.9 (*)     All other components within normal limits   LACTIC ACID, PLASMA - Abnormal; Notable for the following components:    Lactic Acid Level 2.8 (*)     All other components within normal limits   URINALYSIS, REFLEX TO URINE CULTURE - Abnormal; Notable for the following components:    Specific  Gravity, UA 1.035 (*)     Glucose, UA 3+ (*)     All other components within normal limits   BETA - HYDROXYBUTYRATE, SERUM - Abnormal; Notable for the following components:    Beta Hydroxybutyrate 0.80 (*)     All other components within normal limits   CBC WITH DIFFERENTIAL - Abnormal; Notable for the following components:    MPV 12.0 (*)     All other components within normal limits   LACTIC ACID, PLASMA - Abnormal; Notable for the following components:    Lactic Acid Level 3.6 (*)     All other components within normal limits   OSMOLALITY, SERUM - Abnormal; Notable for the following components:    Osmolality 346 (*)     All other components within normal limits   COVID/FLU A&B PCR - Normal    Narrative:     The Xpert Xpress SARS-CoV-2/FLU/RSV plus is a rapid, multiplexed real-time PCR test intended for the simultaneous qualitative detection and differentiation of SARS-CoV-2, Influenza A, Influenza B, and respiratory syncytial virus (RSV) viral RNA in either nasopharyngeal swab or nasal swab specimens.         DRUG SCREEN, URINE (BEAKER) - Normal    Narrative:     Cut off concentrations:    Amphetamines - 1000 ng/ml  Barbiturates - 200 ng/ml  Benzodiazepine - 200 ng/ml  Cannabinoids (THC) - 50 ng/ml  Cocaine - 300 ng/ml  Fentanyl - 1.0 ng/ml  MDMA - 500 ng/ml  Opiates - 300 ng/ml   Phencyclidine (PCP) - 25 ng/ml    Specimen submitted for drug analysis and tested for pH and specific gravity in order to evaluate sample integrity. Suspect tampering if specific gravity is <1.003 and/or pH is not within the range of 4.5 - 8.0  False negatives may result form substances such as bleach added to urine.  False positives may result for the presence of a substance with similar chemical structure to the drug or its metabolite.    This test provides only a PRELIMINARY analytical test result. A more specific alternate chemical method must be used in order to obtain a confirmed analytical result. Gas chromatography/mass  spectrometry (GC/MS) is the preferred confirmatory method. Other chemical confirmation methods are available. Clinical consideration and professional judgement should be applied to any drug of abuse test result, particularly when preliminary positive results are used.    Positive results will be confirmed only at the physicians request. Unconfirmed screening results are to be used only for medical purposes (treatment).        ALCOHOL,MEDICAL (ETHANOL) - Normal   MAGNESIUM - Normal   TROPONIN I - Normal   URINALYSIS, MICROSCOPIC - Normal   BLOOD CULTURE [facility]   BLOOD CULTURE [facility]   FUNGAL CULTURE BLOOD [facility]   CBC W/ AUTO DIFFERENTIAL    Narrative:     The following orders were created for panel order CBC Auto Differential.  Procedure                               Abnormality         Status                     ---------                               -----------         ------                     CBC with Differential[[id number]]        Abnormal            Final result                 Please view results for these tests on the individual orders.   LACTIC ACID, PLASMA   BASIC METABOLIC PANEL   TYPE & SCREEN   POCT GLUCOSE MONITORING CONTINUOUS   POCT GLUCOSE MONITORING CONTINUOUS   POCT GLUCOSE MONITORING CONTINUOUS   POCT GLUCOSE MONITORING CONTINUOUS   POCT GLUCOSE MONITORING CONTINUOUS   PREPARE RBC SOFT        ECG Results              EKG 12-lead (Final result)  Result time 03/08/23 15:42:55      Final result by Interface, Lab [facility] (03/08/23 15:42:55)                   Narrative:    Test Reason : R53.83,    Vent. Rate : 075 BPM     Atrial Rate : 075 BPM     P-R Int : 176 ms          QRS Dur : 148 ms      QT Int : 424 ms       P-R-T Axes : 065 092 -62 degrees     QTc Int : 473 ms    Normal sinus rhythm  Rightward axis  Left bundle branch block  Abnormal ECG  Confirmed by Federico Joy MD ([id number]) on 3/8/2023 3:42:47 PM    Referred By:             Confirmed By:Federico Joy MD                                  Imaging  Results              CT Head Without Contrast (Final result)  Result time 03/08/23 12:23:56      Final result by Karen Fontenot MD (03/08/23 12:23:56)                   Impression:      Chronic age-related changes.  No acute process      Electronically signed by: Karen Fontenot  Date:    03/08/2023  Time:    12:23               Narrative:    EXAMINATION:  CT HEAD WITHOUT CONTRAST    CLINICAL HISTORY:  Mental status change, unknown cause;    TECHNIQUE:  Multiple axial images were obtained from the base of the brain to the vertex without contrast administration.  Sagittal and coronal reconstructions were performed..Automatic exposure control is utilized to reduce patient radiation exposure.    COMPARISON:  02/22/2019    FINDINGS:  There is no intracranial mass or lesion seen.  No hemorrhage is seen.  No acute infarct is seen.  There is some areas of basal ganglia calcification bilaterally which are stable.  Some periventricular areas of mild calcification is seen as well which are also stable since prior examination.  There is some cerebral atrophy seen.  There is some compensatory ventricular dilatation and periventricular white matter changes consistent with the patient's age.  Calvarium is intact.  The posterior fossa is unremarkable..  The visualized portions of the paranasal sinuses show no acute abnormality.                                       X-Ray Chest 1 View (Final result)  Result time 03/08/23 12:38:14      Final result by Mamadou Hollingsworth MD (03/08/23 12:38:14)                   Impression:      No acute chest disease is identified.      Electronically signed by: Mamadou Hollingsworth  Date:    03/08/2023  Time:    12:38               Narrative:    EXAMINATION:  XR CHEST 1 VIEW    CLINICAL HISTORY:  , Altered mental status, unspecified.    COMPARISON:  October 25, 2016    FINDINGS:  No alveolar consolidation, effusion, or pneumothorax is seen.   The thoracic aorta is normal  cardiac silhouette,  central pulmonary vessels and mediastinum are normal in size and are grossly unremarkable.   visualized osseous structures are grossly unremarkable.                                       Medications   miconazole 2 % cream (has no administration in time range)   0.9%  NaCl infusion (has no administration in time range)   glucagon (human recombinant) injection 1 mg (has no administration in time range)   insulin aspart U-100 injection 0-5 Units (has no administration in time range)   dextrose 10% bolus 125 mL 125 mL (has no administration in time range)   dextrose 10% bolus 250 mL 250 mL (has no administration in time range)   glucose chewable tablet 16 g (has no administration in time range)   glucose chewable tablet 32 g (has no administration in time range)   0.9%  NaCl infusion (for blood administration) (has no administration in time range)   insulin regular in 0.9 % NaCl 100 unit/100 mL (1 unit/mL) infusion (has no administration in time range)   dextrose 50% injection 12.5 g (has no administration in time range)   sodium chloride 0.9% bolus 2,000 mL 2,000 mL (has no administration in time range)   sodium chloride 0.9% bolus 1,000 mL 1,000 mL (has no administration in time range)   0.45 % NaCl with KCl 20 mEq infusion (has no administration in time range)   0.9 % NaCl with KCl 20 mEq infusion (has no administration in time range)   dextrose 5 % and 0.45 % NaCl with KCl 20 mEq infusion (has no administration in time range)   sodium chloride 0.9% bolus 2,000 mL 2,000 mL (0 mLs Intravenous Stopped 3/8/23 1500)   insulin regular injection 10 Units 0.1 mL (10 Units Intravenous Given 3/8/23 1430)   insulin regular injection 10 Units 0.1 mL (10 Units Subcutaneous Given 3/8/23 1424)   pantoprazole injection 80 mg (80 mg Intravenous Given 3/8/23 1430)        Medical Decision Making  Differential diagnosis include, but are not limited to: hyperglycemia, DKA, GI bleed, myocardial infarction, dehydration, urinary tract  infection, confusion, electrolyte abnormality.     Amount and/or Complexity of Data Reviewed  Independent Historian: EMS     Details: Per EMS, pt has had generalized weakness and bright red blood in his stool for several weeks. EMS reports CBG >500 en route. EMS states the pt lives at home with his wife.  Labs: ordered. Decision-making details documented in ED Course.  Radiology: ordered. Decision-making details documented in ED Course.              Scribe Attestation:   Scribe #1: I performed the above scribed service and the documentation accurately describes the services I performed. I attest to the accuracy of the note.    Attending Attestation:           Physician Attestation for Scribe:  Physician Attestation Statement for Scribe #1: I, Elian Bennett MD, reviewed documentation, as scribed by Radha Branham in my presence, and it is both accurate and complete.           ED Course as of 03/08/23 1609   Wed Mar 08, 2023   1412 Glucose is over 1000.  Sodium 129 likely pseudohyponatremia.  Started on 2 L of IV fluids.  Does have mild ROBERT associated with this.  Continue IV hydration will give insulin as well.  Beta hydroxybutyrate is only 0.8 ABG shows alkalosis.  No ketones in urine.  Do not believe is in DKA.  Significant hyperglycemia with encephalopathy [LF]   1418 He is had some generalized weakness but is actually alert and oriented x4.  Answers questions appropriately.  Denies any pain while actually passing stool but he is having lot of pain he states see feels like his but is sticking together.  On exam he does have bright erythematous skin without any tissue breakdown around the perineum and in the bilateral inguinal folds.  Will place antifungal cream on this.  There are no masses or fluctuance on my rectal examination.  Glucose returned over a 1000 consistent with HHS.  He has no ketones in his urine his pH is not low.  Continue IV fluids and insulin treatment. [LF]   1549 Repeat CBG still over 500.   Has had 10 IV insulin 10 subcu insulin 2 L boluses.  Will continue IV fluids at 200 cc an hour  [LF]   1607 Start patient on insulin drip for HHS.  Discussed with the ICU resident who will admit [LF]      ED Course User Index  [LF] Elian Bennett MD                 Clinical Impression:   Final diagnoses:  [R41.82] Altered mental status  [R41.82] AMS (altered mental status)  [R73.9] Acute hyperglycemia  [B37.9] Yeast infection  [N17.9] ROBERT (acute kidney injury)  [E11.00] Hyperosmolar hyperglycemic state (HHS)  [K92.1] [facility]        ED Disposition Condition    Admit Critical                Elian Bennett MD  03/08/23 [id number]

## 2023-03-08 NOTE — H&P
[facility] - Emergency Dept  Pulmonary Critical Care Note    Patient Name: John Mancia Jr.  MRN: [id number]  Admission Date: 3/8/2023  Hospital Length of Stay: 0 days  Code Status: Full Code  Attending Provider: Attila Garcia Jr., MD, *  Primary Care Provider: Magda Hernandez MD     Subjective:     HPI:   Mr. Mancia is a 73 y/o male with a PMH of CAD s/p CABG, DMII on insulin, HLD, HTN, bipolar disorder, and CVA (unknown deficits) who presents to the [facility] ED with complaints of generalized weakness, fatigue, and unclear history of hematochezia in his stool. Patient denies any chest pain, shortness of breath, cough, nausea, vomiting, diarrhea, numbness, or weakness.    In the ED, patient was found to have a blood sugar of 1043, pseudohyponatremia of 129, hyperkalemia of 5.6, hypochloremia of 89, lactic acidosis, hypercalcemia with corrected calcium of 13, and an ABG showed 7.52/31/106/25.3. Patient had a CXR which showed no acute process, and a CT head which was negative for acute processes. Patient was admitted to the ICU for HHS and treatment with insulin gtt.     Hospital Course/Significant events:      24 Hour Interval History:  N/A    Past Medical History:   Diagnosis Date    Arthritis     Bipolar disorder, unspecified     CHF (congestive heart failure)     Coronary artery disease     Diabetes mellitus     GERD (gastroesophageal reflux disease)     High cholesterol     Hx of eye surgery     Hypertension     ST elevation (STEMI) myocardial infarction of unspecified site     Stroke        Past Surgical History:   Procedure Laterality Date    CORONARY ARTERY BYPASS GRAFT      KNEE SURGERY         Social History     Socioeconomic History    Marital status: Single   Tobacco Use    Smoking status: Former    Smokeless tobacco: Never           No current outpatient medications    Current Inpatient Medications   heparin (porcine)  5,000 Units Subcutaneous Q12H    miconazole   Topical (Top) BID     sodium chloride 0.9%  1,000 mL Intravenous Once    sodium chloride 0.9%  2,000 mL Intravenous Once       Current Intravenous Infusions   0.45 % NaCl with KCl 20 mEq      0/9% NACL & POTASSIUM CHLORIDE 20 MEQ/L      sodium chloride 0.9%      sodium chloride 0.9%      dextrose 5 % and 0.45 % NaCl with KCl 20 mEq      insulin regular 1 units/mL infusion orderable DKA [facility]           Review of Systems   Constitutional:  Positive for malaise/fatigue. Negative for chills, diaphoresis and fever.   HENT:  Negative for congestion.    Eyes:  Negative for blurred vision.   Respiratory:  Negative for cough, sputum production, shortness of breath and wheezing.    Cardiovascular:  Positive for leg swelling. Negative for chest pain, palpitations and claudication.   Gastrointestinal:  Positive for blood in stool. Negative for abdominal pain, diarrhea, heartburn, nausea and vomiting.   Genitourinary:  Negative for dysuria, frequency and urgency.   Skin:  Negative for itching and rash.   Neurological:  Negative for dizziness, sensory change, focal weakness, seizures and loss of consciousness.   Endo/Heme/Allergies: Negative.    Psychiatric/Behavioral: Negative.          Objective:       Intake/Output Summary (Last 24 hours) at 3/8/2023 1630  Last data filed at 3/8/2023 1500  Gross per 24 hour   Intake 2000 ml   Output --   Net 2000 ml         Vital Signs (Most Recent):  Temp: 98.4 °F (36.9 °C) (03/08/23 1056)  Pulse: 74 (03/08/23 1221)  Resp: 15 (03/08/23 1221)  BP: 135/79 (03/08/23 1221)  SpO2: 96 % (03/08/23 1221)  Body mass index is 33.97 kg/m².  Weight: 104.3 kg (230 lb) Vital Signs (24h Range):  Temp:  [98.4 °F (36.9 °C)] 98.4 °F (36.9 °C)  Pulse:  [74-82] 74  Resp:  [15-20] 15  SpO2:  [95 %-97 %] 96 %  BP: (128-152)/(67-94) 135/79     Physical Exam  Constitutional:       General: He is not in acute distress.     Appearance: He is not toxic-appearing.   HENT:      Head: Normocephalic and atraumatic.      Nose: Nose normal.       Mouth/Throat:      Mouth: Mucous membranes are dry.      Pharynx: Oropharynx is clear.      Comments: Possible dried vomit on upper lip  Eyes:      Extraocular Movements: Extraocular movements intact.      Conjunctiva/sclera: Conjunctivae normal.      Pupils: Pupils are equal, round, and reactive to light.   Cardiovascular:      Rate and Rhythm: Normal rate and regular rhythm.      Pulses: Normal pulses.      Heart sounds: Normal heart sounds.   Pulmonary:      Effort: Pulmonary effort is normal.      Breath sounds: Normal breath sounds. No wheezing, rhonchi or rales.   Abdominal:      General: Abdomen is flat. Bowel sounds are normal.      Palpations: Abdomen is soft.      Tenderness: There is no abdominal tenderness. There is no guarding or rebound.   Musculoskeletal:      Cervical back: Normal range of motion and neck supple.      Right lower leg: No edema.      Left lower leg: No edema.   Skin:     General: Skin is warm and dry.      Capillary Refill: Capillary refill takes less than 2 seconds.      Comments: Yeast like rash in skin folds surrounding the groin   Neurological:      General: No focal deficit present.      Mental Status: He is alert and oriented to person, place, and time.      Cranial Nerves: No cranial nerve deficit.      Motor: No weakness.         Lines/Drains/Airways       Peripheral Intravenous Line  Duration                  Peripheral IV - Single Lumen 03/08/23 1324 20 G Left;Posterior Hand <1 day                    Significant Labs:    Lab Results   Component Value Date    WBC 8.5 03/08/2023    HGB 17.3 03/08/2023    HCT 49.6 03/08/2023    MCV 86.4 03/08/2023     03/08/2023         BMP  Lab Results   Component Value Date     (L) 03/08/2023    K 5.6 (H) 03/08/2023    CO2 25 03/08/2023    BUN 18.4 03/08/2023    CREATININE 1.76 (H) 03/08/2023    CALCIUM 10.5 (H) 03/08/2023    EGFRNONAA >60 05/25/2021       ABG  Recent Labs   Lab 03/08/23  1231   PH 7.52*   PO2 106*   PCO2 31*    HCO3 25.3       Mechanical Ventilation Support:   None    Significant Imaging:  EXAMINATION:   XR CHEST 1 VIEW     CLINICAL HISTORY:   , Altered mental status, unspecified.     COMPARISON:   October 25, 2016     FINDINGS:   No alveolar consolidation, effusion, or pneumothorax is seen.   The thoracic aorta is normal  cardiac silhouette, central pulmonary vessels and mediastinum are normal in size and are grossly unremarkable.   visualized osseous structures are grossly unremarkable.    Impression:       No acute chest disease is identified.            Assessment/Plan:     Assessment  HHS 2/2 medication non-compliance   Hyperkalemia   Lactic Acidosis   Hypercalcemia (Ca 13 corrected)  Pseudohyponatremia   Hypochloremia   Hypoalbuminemia   ROBERT  Candidiasis of the groin      Plan  Admit to the ICU for insulin gtt and treatment of HHS  Will obtain q4h Mg, Phos, BMP  Received 2L NS bolus in the ED, continue NS 250cc/h x 8 hours  If hypercalcemia does not improve with fluids will start calcitonin   Strict I's and O's  Ordered urine electrolytes  ABG shows 7.52/31/106/25.3, resp alkalosis  EtOH level negative, UDS negative  CXR shows no suspicion for infection, blood cultures x2 pending  CT head negative for acute abnormalities   Trend lactic acid level q6h until normal   Expect potassium to normalize with insulin gtt and resolution of lactic acidosis   Beta-hydroxybutyrate mildly elevated at 0.8, expect component of starvation or alcoholic ketoacidosis   Ordered prealbumin level to access nutritional status  Ordered PT/OT evaluation  Monitor for hematochezia or melena, patient denies episodes of blood stools, started on Protonix 40mg BID  Continue miconazole cream to groin       DVT Prophylaxis: Heparin 5000 units BID  GI Prophylaxis: Protonix 40mg BID     32 minutes of critical care was time spent personally by me on the following activities: development of treatment plan with patient or surrogate and bedside  caregivers, discussions with consultants, evaluation of patient's response to treatment, examination of patient, ordering and performing treatments and interventions, ordering and review of laboratory studies, ordering and review of radiographic studies, pulse oximetry, re-evaluation of patient's condition.  This critical care time did not overlap with that of any other provider or involve time for any procedures.     Reece Shaver, DO  Pulmonary Critical Care Medicine  [facility] - Emergency Dept

## 2023-03-09 LAB
ALBUMIN SERPL-MCNC: 2.8 G/DL (ref 3.4–4.8)
ALBUMIN/GLOB SERPL: 0.9 RATIO (ref 1.1–2)
ALP SERPL-CCNC: 70 UNIT/L (ref 40–150)
ALT SERPL-CCNC: 20 UNIT/L (ref 0–55)
ANION GAP SERPL CALC-SCNC: 4 MEQ/L
ANION GAP SERPL CALC-SCNC: 7 MEQ/L
ANION GAP SERPL CALC-SCNC: 8 MEQ/L
ANION GAP SERPL CALC-SCNC: 8 MEQ/L
ANION GAP SERPL CALC-SCNC: 9 MEQ/L
AST SERPL-CCNC: 16 UNIT/L (ref 5–34)
BASOPHILS # BLD AUTO: 0.04 X10(3)/MCL (ref 0–0.2)
BASOPHILS NFR BLD AUTO: 0.5 %
BILIRUBIN DIRECT+TOT PNL SERPL-MCNC: 0.6 MG/DL
BUN SERPL-MCNC: 10.4 MG/DL (ref 8.4–25.7)
BUN SERPL-MCNC: 11.4 MG/DL (ref 8.4–25.7)
BUN SERPL-MCNC: 11.7 MG/DL (ref 8.4–25.7)
BUN SERPL-MCNC: 11.8 MG/DL (ref 8.4–25.7)
BUN SERPL-MCNC: 12.9 MG/DL (ref 8.4–25.7)
BUN SERPL-MCNC: 13.3 MG/DL (ref 8.4–25.7)
CALCIUM SERPL-MCNC: 8.2 MG/DL (ref 8.8–10)
CALCIUM SERPL-MCNC: 8.3 MG/DL (ref 8.8–10)
CALCIUM SERPL-MCNC: 8.3 MG/DL (ref 8.8–10)
CALCIUM SERPL-MCNC: 8.6 MG/DL (ref 8.8–10)
CALCIUM SERPL-MCNC: 8.6 MG/DL (ref 8.8–10)
CALCIUM SERPL-MCNC: 8.8 MG/DL (ref 8.8–10)
CHLORIDE SERPL-SCNC: 103 MMOL/L (ref 98–107)
CHLORIDE SERPL-SCNC: 103 MMOL/L (ref 98–107)
CHLORIDE SERPL-SCNC: 104 MMOL/L (ref 98–107)
CHLORIDE SERPL-SCNC: 105 MMOL/L (ref 98–107)
CHLORIDE SERPL-SCNC: 107 MMOL/L (ref 98–107)
CHLORIDE SERPL-SCNC: 107 MMOL/L (ref 98–107)
CO2 SERPL-SCNC: 23 MMOL/L (ref 23–31)
CO2 SERPL-SCNC: 24 MMOL/L (ref 23–31)
CO2 SERPL-SCNC: 27 MMOL/L (ref 23–31)
CO2 SERPL-SCNC: 29 MMOL/L (ref 23–31)
CO2 SERPL-SCNC: 29 MMOL/L (ref 23–31)
CO2 SERPL-SCNC: 30 MMOL/L (ref 23–31)
CREAT SERPL-MCNC: 0.87 MG/DL (ref 0.73–1.18)
CREAT SERPL-MCNC: 0.87 MG/DL (ref 0.73–1.18)
CREAT SERPL-MCNC: 0.9 MG/DL (ref 0.73–1.18)
CREAT SERPL-MCNC: 0.95 MG/DL (ref 0.73–1.18)
CREAT SERPL-MCNC: 0.96 MG/DL (ref 0.73–1.18)
CREAT SERPL-MCNC: 1.03 MG/DL (ref 0.73–1.18)
CREAT/UREA NIT SERPL: 11
CREAT/UREA NIT SERPL: 11
CREAT/UREA NIT SERPL: 13
CREAT/UREA NIT SERPL: 14
CREAT/UREA NIT SERPL: 14
EOSINOPHIL # BLD AUTO: 0.21 X10(3)/MCL (ref 0–0.9)
EOSINOPHIL NFR BLD AUTO: 2.5 %
ERYTHROCYTE [DISTWIDTH] IN BLOOD BY AUTOMATED COUNT: 12.8 % (ref 11.5–17)
GFR SERPLBLD CREATININE-BSD FMLA CKD-EPI: >60 MLS/MIN/1.73/M2
GLOBULIN SER-MCNC: 3.2 GM/DL (ref 2.4–3.5)
GLUCOSE SERPL-MCNC: 194 MG/DL (ref 82–115)
GLUCOSE SERPL-MCNC: 214 MG/DL (ref 82–115)
GLUCOSE SERPL-MCNC: 318 MG/DL (ref 82–115)
GLUCOSE SERPL-MCNC: 324 MG/DL (ref 82–115)
GLUCOSE SERPL-MCNC: 375 MG/DL (ref 82–115)
GLUCOSE SERPL-MCNC: 432 MG/DL (ref 82–115)
HCT VFR BLD AUTO: 42.7 % (ref 42–52)
HGB BLD-MCNC: 14.7 G/DL (ref 14–18)
IMM GRANULOCYTES # BLD AUTO: 0.02 X10(3)/MCL (ref 0–0.04)
IMM GRANULOCYTES NFR BLD AUTO: 0.2 %
LACTATE SERPL-SCNC: 1.5 MMOL/L (ref 0.5–2.2)
LYMPHOCYTES # BLD AUTO: 2.77 X10(3)/MCL (ref 0.6–4.6)
LYMPHOCYTES NFR BLD AUTO: 33.5 %
MAGNESIUM SERPL-MCNC: 1.7 MG/DL (ref 1.6–2.6)
MAGNESIUM SERPL-MCNC: 1.7 MG/DL (ref 1.6–2.6)
MAGNESIUM SERPL-MCNC: 1.9 MG/DL (ref 1.6–2.6)
MAGNESIUM SERPL-MCNC: 2 MG/DL (ref 1.6–2.6)
MAGNESIUM SERPL-MCNC: 2 MG/DL (ref 1.6–2.6)
MCH RBC QN AUTO: 30.4 PG
MCHC RBC AUTO-ENTMCNC: 34.4 G/DL (ref 33–36)
MCV RBC AUTO: 88.4 FL (ref 80–94)
MONOCYTES # BLD AUTO: 0.93 X10(3)/MCL (ref 0.1–1.3)
MONOCYTES NFR BLD AUTO: 11.2 %
NEUTROPHILS # BLD AUTO: 4.31 X10(3)/MCL (ref 2.1–9.2)
NEUTROPHILS NFR BLD AUTO: 52.1 %
NRBC BLD AUTO-RTO: 0 %
PHOSPHATE SERPL-MCNC: 1.7 MG/DL (ref 2.3–4.7)
PHOSPHATE SERPL-MCNC: 1.9 MG/DL (ref 2.3–4.7)
PHOSPHATE SERPL-MCNC: 2 MG/DL (ref 2.3–4.7)
PHOSPHATE SERPL-MCNC: 3 MG/DL (ref 2.3–4.7)
PHOSPHATE SERPL-MCNC: 3.6 MG/DL (ref 2.3–4.7)
PLATELET # BLD AUTO: 136 X10(3)/MCL (ref 130–400)
PMV BLD AUTO: 11.4 FL (ref 7.4–10.4)
POCT GLUCOSE: 199 MG/DL (ref 70–110)
POCT GLUCOSE: 248 MG/DL (ref 70–110)
POCT GLUCOSE: 295 MG/DL (ref 70–110)
POCT GLUCOSE: 299 MG/DL (ref 70–110)
POCT GLUCOSE: 302 MG/DL (ref 70–110)
POCT GLUCOSE: 331 MG/DL (ref 70–110)
POCT GLUCOSE: 367 MG/DL (ref 70–110)
POCT GLUCOSE: 384 MG/DL (ref 70–110)
POCT GLUCOSE: 448 MG/DL (ref 70–110)
POTASSIUM SERPL-SCNC: 3.9 MMOL/L (ref 3.5–5.1)
POTASSIUM SERPL-SCNC: 4 MMOL/L (ref 3.5–5.1)
POTASSIUM SERPL-SCNC: 4.2 MMOL/L (ref 3.5–5.1)
POTASSIUM SERPL-SCNC: 4.5 MMOL/L (ref 3.5–5.1)
POTASSIUM SERPL-SCNC: 4.7 MMOL/L (ref 3.5–5.1)
POTASSIUM SERPL-SCNC: 4.8 MMOL/L (ref 3.5–5.1)
PROT SERPL-MCNC: 6 GM/DL (ref 5.8–7.6)
RBC # BLD AUTO: 4.83 X10(6)/MCL (ref 4.7–6.1)
SODIUM SERPL-SCNC: 134 MMOL/L (ref 136–145)
SODIUM SERPL-SCNC: 135 MMOL/L (ref 136–145)
SODIUM SERPL-SCNC: 139 MMOL/L (ref 136–145)
SODIUM SERPL-SCNC: 141 MMOL/L (ref 136–145)
SODIUM SERPL-SCNC: 142 MMOL/L (ref 136–145)
SODIUM SERPL-SCNC: 145 MMOL/L (ref 136–145)
WBC # SPEC AUTO: 8.3 X10(3)/MCL (ref 4.5–11.5)

## 2023-03-09 PROCEDURE — 84100 ASSAY OF PHOSPHORUS: CPT | Performed by: STUDENT IN AN ORGANIZED HEALTH CARE EDUCATION/TRAINING PROGRAM

## 2023-03-09 PROCEDURE — 83605 ASSAY OF LACTIC ACID: CPT | Performed by: EMERGENCY MEDICINE

## 2023-03-09 PROCEDURE — 25000003 PHARM REV CODE 250: Performed by: INTERNAL MEDICINE

## 2023-03-09 PROCEDURE — 63600175 PHARM REV CODE 636 W HCPCS: Performed by: STUDENT IN AN ORGANIZED HEALTH CARE EDUCATION/TRAINING PROGRAM

## 2023-03-09 PROCEDURE — 27000221 HC OXYGEN, UP TO 24 HOURS

## 2023-03-09 PROCEDURE — 83735 ASSAY OF MAGNESIUM: CPT | Performed by: STUDENT IN AN ORGANIZED HEALTH CARE EDUCATION/TRAINING PROGRAM

## 2023-03-09 PROCEDURE — 63600175 PHARM REV CODE 636 W HCPCS: Performed by: INTERNAL MEDICINE

## 2023-03-09 PROCEDURE — 94761 N-INVAS EAR/PLS OXIMETRY MLT: CPT

## 2023-03-09 PROCEDURE — 80053 COMPREHEN METABOLIC PANEL: CPT | Performed by: STUDENT IN AN ORGANIZED HEALTH CARE EDUCATION/TRAINING PROGRAM

## 2023-03-09 PROCEDURE — 25000003 PHARM REV CODE 250: Performed by: STUDENT IN AN ORGANIZED HEALTH CARE EDUCATION/TRAINING PROGRAM

## 2023-03-09 PROCEDURE — 11000001 HC ACUTE MED/SURG PRIVATE ROOM

## 2023-03-09 PROCEDURE — 97162 PT EVAL MOD COMPLEX 30 MIN: CPT

## 2023-03-09 PROCEDURE — 63600175 PHARM REV CODE 636 W HCPCS: Performed by: EMERGENCY MEDICINE

## 2023-03-09 PROCEDURE — 21400001 HC TELEMETRY ROOM

## 2023-03-09 PROCEDURE — 97166 OT EVAL MOD COMPLEX 45 MIN: CPT

## 2023-03-09 PROCEDURE — 85025 COMPLETE CBC W/AUTO DIFF WBC: CPT | Performed by: STUDENT IN AN ORGANIZED HEALTH CARE EDUCATION/TRAINING PROGRAM

## 2023-03-09 RX ORDER — FENOFIBRATE 145 MG/1
145 TABLET, FILM COATED ORAL
Status: DISCONTINUED | OUTPATIENT
Start: 2023-03-09 | End: 2023-03-11 | Stop reason: HOSPADM

## 2023-03-09 RX ORDER — UBIDECARENONE 75 MG
500 CAPSULE ORAL DAILY
Status: DISCONTINUED | OUTPATIENT
Start: 2023-03-09 | End: 2023-03-11 | Stop reason: HOSPADM

## 2023-03-09 RX ORDER — COLESEVELAM 180 1/1
1875 TABLET ORAL
Status: DISCONTINUED | OUTPATIENT
Start: 2023-03-09 | End: 2023-03-11 | Stop reason: HOSPADM

## 2023-03-09 RX ORDER — MICONAZOLE NITRATE 2 %
POWDER (GRAM) TOPICAL 2 TIMES DAILY
Status: DISCONTINUED | OUTPATIENT
Start: 2023-03-09 | End: 2023-03-11 | Stop reason: HOSPADM

## 2023-03-09 RX ORDER — LANOLIN ALCOHOL/MO/W.PET/CERES
400 CREAM (GRAM) TOPICAL DAILY
Status: DISCONTINUED | OUTPATIENT
Start: 2023-03-09 | End: 2023-03-11 | Stop reason: HOSPADM

## 2023-03-09 RX ORDER — ASPIRIN 81 MG/1
81 TABLET ORAL DAILY
Status: DISCONTINUED | OUTPATIENT
Start: 2023-03-09 | End: 2023-03-11 | Stop reason: HOSPADM

## 2023-03-09 RX ORDER — METOPROLOL TARTRATE 50 MG/1
50 TABLET ORAL 2 TIMES DAILY
Status: DISCONTINUED | OUTPATIENT
Start: 2023-03-09 | End: 2023-03-11 | Stop reason: HOSPADM

## 2023-03-09 RX ADMIN — INSULIN ASPART 8 UNITS: 100 INJECTION, SOLUTION INTRAVENOUS; SUBCUTANEOUS at 08:03

## 2023-03-09 RX ADMIN — PANTOPRAZOLE SODIUM 40 MG: 40 TABLET, DELAYED RELEASE ORAL at 06:03

## 2023-03-09 RX ADMIN — Medication: at 08:03

## 2023-03-09 RX ADMIN — HEPARIN SODIUM 5000 UNITS: 5000 INJECTION, SOLUTION INTRAVENOUS; SUBCUTANEOUS at 08:03

## 2023-03-09 RX ADMIN — ASPIRIN 81 MG: 81 TABLET, COATED ORAL at 10:03

## 2023-03-09 RX ADMIN — INSULIN ASPART 6 UNITS: 100 INJECTION, SOLUTION INTRAVENOUS; SUBCUTANEOUS at 04:03

## 2023-03-09 RX ADMIN — MICONAZOLE NITRATE: 20 CREAM TOPICAL at 08:03

## 2023-03-09 RX ADMIN — Medication: at 10:03

## 2023-03-09 RX ADMIN — FENOFIBRATE 145 MG: 145 TABLET, FILM COATED ORAL at 10:03

## 2023-03-09 RX ADMIN — MICONAZOLE NITRATE: 20 POWDER TOPICAL at 10:03

## 2023-03-09 RX ADMIN — Medication 400 MG: at 10:03

## 2023-03-09 RX ADMIN — POTASSIUM CHLORIDE, DEXTROSE MONOHYDRATE AND SODIUM CHLORIDE: 150; 5; 450 INJECTION, SOLUTION INTRAVENOUS at 06:03

## 2023-03-09 RX ADMIN — MICONAZOLE NITRATE: 20 POWDER TOPICAL at 08:03

## 2023-03-09 RX ADMIN — INSULIN DETEMIR 77 UNITS: 100 INJECTION, SOLUTION SUBCUTANEOUS at 10:03

## 2023-03-09 RX ADMIN — TRAZODONE HYDROCHLORIDE 25 MG: 50 TABLET ORAL at 08:03

## 2023-03-09 RX ADMIN — COLESEVELAM HYDROCHLORIDE 1875 MG: 625 TABLET, COATED ORAL at 10:03

## 2023-03-09 RX ADMIN — INSULIN ASPART 4 UNITS: 100 INJECTION, SOLUTION INTRAVENOUS; SUBCUTANEOUS at 08:03

## 2023-03-09 RX ADMIN — CYANOCOBALAMIN TAB 500 MCG 500 MCG: 500 TAB at 10:03

## 2023-03-09 RX ADMIN — INSULIN ASPART 2 UNITS: 100 INJECTION, SOLUTION INTRAVENOUS; SUBCUTANEOUS at 12:03

## 2023-03-09 NOTE — PLAN OF CARE
Problem: Adult Inpatient Plan of Care  Goal: Plan of Care Review  Outcome: Ongoing, Progressing  Goal: Patient-Specific Goal (Individualized)  Outcome: Ongoing, Progressing  Goal: Absence of Hospital-Acquired Illness or Injury  Outcome: Ongoing, Progressing  Goal: Optimal Comfort and Wellbeing  Outcome: Ongoing, Progressing  Goal: Readiness for Transition of Care  Outcome: Ongoing, Progressing     Problem: Hyperosmolar Hyperglycemic State  Goal: Serum Glucose and Electrolytes Within Desired Range  Outcome: Ongoing, Progressing     Problem: Diabetes Comorbidity  Goal: Blood Glucose Level Within Targeted Range  Outcome: Ongoing, Progressing

## 2023-03-09 NOTE — PT/OT/SLP EVAL
Physical Therapy Evaluation/Re-Evaluation    Patient Name:  John Mancia Jr.   MRN:  [id number]    Recommendations:     Discharge Recommendations: home   Discharge Equipment Recommendations: none   Barriers to discharge:  medical dx, decreased functional independence, safety awareness    Assessment:     John Mancia Jr. is a 72 y.o. male admitted with a medical diagnosis of diabetic hyperglycemic hyperosmolar syndrome (HHS); lactic acidosis.  He presents with the following impairments/functional limitations: weakness, gait instability, impaired endurance, impaired balance, decreased lower extremity function, impaired self care skills, impaired functional mobility.    Rehab Prognosis: Good; patient would benefit from acute skilled PT services to address these deficits and reach maximum level of function.    Recent Surgery: * No surgery found *      Plan:     During this hospitalization, patient to be seen 5 x/week to address the identified rehab impairments via gait training, therapeutic activities, therapeutic exercises and progress toward the following goals:    Plan of Care Expires:  04/09/23    Subjective     Chief Complaint: n/a  Patient/Family Comments/goals: to go home  Pain/Comfort:  Pain Rating 1: 0/10    Patients cultural, spiritual, Catholic conflicts given the current situation: no    Living Environment:  Pt lives with a roommate in a [facility]  Prior to admission, patients level of function was independent.    Equipment used at home: none.  DME owned (not currently used): rolling walker.    Upon discharge, patient will have assistance from unsure.    Objective:     Communicated with NSG prior to session.  Patient found up in chair with blood pressure cuff, pulse ox (continuous), telemetry, peripheral IV  upon PT entry to room.    General Precautions: Standard, fall  Orthopedic Precautions:N/A   Braces: N/A  Respiratory Status: Room air      Exams:  Cognitive Exam:  Patient is oriented to Person, Place,  Time, and Situation  RLE Strength: WFL  LLE Strength: WFL  Skin integrity: Visible skin intact      Functional Mobility:  Transfers:     Sit to Stand:  minimum assistance with rolling walker  Gait: pt ambulated 100ft with use of RW + 80ft without use of AD  Step through pattern, slightly unsteady especially without use of RW  Decreased safety awareness; required cues for navigation     Patient provided with verbal education regarding POC, mobility, and safety.  Understanding was verbalized.     Patient left up in chair with all lines intact, call button in reach, and RN notified.    GOALS:   Multidisciplinary Problems       Physical Therapy Goals          Problem: Physical Therapy    Goal Priority Disciplines Outcome Goal Variances Interventions   Physical Therapy Goal     PT, PT/OT Ongoing, Progressing     Description: Goals to be met by: 23     Patient will increase functional independence with mobility by performin. Supine to sit with Stand-by Assistance  2. Sit to supine with Stand-by Assistance  3. Sit to stand transfer with Modified [place]  4. Gait  x 200 feet with Modified [place] using No Assistive Device vs RW.                          History:     Past Medical History:   Diagnosis Date    Arthritis     Bipolar disorder, unspecified     CHF (congestive heart failure)     Coronary artery disease     Diabetes mellitus     GERD (gastroesophageal reflux disease)     High cholesterol     Hx of eye surgery     Hypertension     ST elevation (STEMI) myocardial infarction of unspecified site     Stroke        Past Surgical History:   Procedure Laterality Date    CORONARY ARTERY BYPASS GRAFT      KNEE SURGERY         Time Tracking:     PT Received On: 23  PT Start Time: 1107     PT Stop Time: 1119  PT Total Time (min): 12 min     Billable Minutes: Evaluation mod      2023

## 2023-03-09 NOTE — NURSING
Nurses Note -- 4 Eyes      3/9/2023   11:08 AM      Skin assessed during: Daily Assessment      [] No Pressure Injuries Present    []Prevention Measures Documented      [x] Yes- Altered Skin Integrity Present or Discovered   [x] LDA Added if Not in Epic (Describe Wound)   [x] New Altered Skin Integrity was Present on Admit and Documented in LDA   [x] Wound Image Taken    Wound Care Consulted? Yes    Attending Nurse:  Andrew Freedman RN     Second RN/Staff Member:  Nurse Marisol Tech

## 2023-03-09 NOTE — PLAN OF CARE
Problem: Physical Therapy  Goal: Physical Therapy Goal  Description: Goals to be met by: 23     Patient will increase functional independence with mobility by performin. Supine to sit with Stand-by Assistance  2. Sit to supine with Stand-by Assistance  3. Sit to stand transfer with Modified [place]  4. Gait  x 200 feet with Modified [place] using No Assistive Device vs RW.     Outcome: Ongoing, Progressing

## 2023-03-09 NOTE — NURSING
Subjective:       Patient ID: John Mancia Jr. is a 72 y.o. male.    Chief Complaint: Fatigue (Pt c/o generalized weakness and bright red blood in stool for several weeks. CBG >500)    HPI  Review of Systems    Objective:      Physical Exam    Assessment:       1. Hyperosmolar hyperglycemic state (HHS)    2. Fatigue, unspecified type    3. Altered mental status    4. AMS (altered mental status)    5. Acute hyperglycemia    6. Yeast infection    7. ROBERT (acute kidney injury)    8. Melena    9. Lactic acidosis           Altered Skin Integrity 03/08/23 1325 Left Perineum  (Active)   03/08/23 1325   Altered Skin Integrity Present on Admission - Did Patient arrive to the hospital with altered skin?: yes   Side: Left   Orientation:    Location: Perineum   Wound Number:    Is this injury device related?:    Primary Wound Type:    Description of Altered Skin Integrity:    Ankle-Brachial Index:    Pulses:    Removal Indication and Assessment:    Wound Outcome:    (Retired) Wound Length (cm):    (Retired) Wound Width (cm):    (Retired) Depth (cm):    Wound Description (Comments):    Removal Indications:    Description of Altered Skin Integrity Partial thickness tissue loss. Shallow open ulcer with a red or pink wound bed, without slough. Intact or Open/Ruptured Serum-filled blister. 03/09/23 0703   Dressing Appearance Open to air 03/09/23 0703   Drainage Amount None 03/09/23 0703   Appearance Red 03/09/23 0703   Care Cleansed with:;Soap and water;Applied:;Other (see comments) 03/08/23 2000            Altered Skin Integrity 03/08/23 2000 medial Buttocks Partial thickness tissue loss. Shallow open ulcer with a red or pink wound bed, without slough. Intact or Open/Ruptured Serum-filled blister. (Active)   03/08/23 2000   Altered Skin Integrity Present on Admission - Did Patient arrive to the hospital with altered skin?: yes   Side:    Orientation: medial   Location: Buttocks   Wound Number:    Is this injury device related?:     Primary Wound Type:    Description of Altered Skin Integrity: Partial thickness tissue loss. Shallow open ulcer with a red or pink wound bed, without slough. Intact or Open/Ruptured Serum-filled blister.   Ankle-Brachial Index:    Pulses:    Removal Indication and Assessment:    Wound Outcome:    (Retired) Wound Length (cm):    (Retired) Wound Width (cm):    (Retired) Depth (cm):    Wound Description (Comments):    Removal Indications:    Wound Image   03/09/23 1107   Description of Altered Skin Integrity Partial thickness tissue loss. Shallow open ulcer with a red or pink wound bed, without slough. Intact or Open/Ruptured Serum-filled blister. 03/09/23 0703   Dressing Appearance Open to air 03/09/23 1107   Drainage Amount None 03/09/23 1107   Drainage Characteristics/Odor No odor 03/09/23 1107   Appearance Red;Moist;Dry 03/09/23 1107   Periwound Area Dry 03/09/23 1107   Care Cleansed with:;Wound cleanser;Applied:;Skin Barrier 03/09/23 1107           Plan:       73 y/o male in with hyperglycemia-lactic acidosis-yeast infection.   Awake alert oriented and can assist with turning.    Skin issues fungal like rash redness to groin perineum and up buttock crease. See photo.   Care instructions to rhys.   Will re-eval post transfer out of ICU.

## 2023-03-09 NOTE — PROGRESS NOTES
"Inpatient Nutrition Evaluation    Admit Date: 3/8/2023   Total duration of encounter: 1 day    Nutrition Recommendation/Prescription     Continue current diet as tolerated.  Add Boost Glucose Control (provides 250 kcal, 14 g protein per serving) TID.    Nutrition Assessment     Chart Review    Reason Seen: malnutrition screening tool (MST)    Malnutrition Screening Tool Results   Have you recently lost weight without trying?: Unsure  Have you been eating poorly because of a decreased appetite?: No   MST Score: 2     Diagnosis:  HHS 2/2 medication non-compliance   Hyperkalemia   Lactic Acidosis   Hypercalcemia  Pseudohyponatremia   Hypochloremia   Hypoalbuminemia   ROBERT  Candidiasis of the groin    Relevant Medical History: CAD, CABG, DM, HLD, HTN, bipolar, CVA    Nutrition-Related Medications: B12, detemir 77 Units, SSI    Nutrition-Related Labs:  3/9 Phos 2    Diet Order: Diet diabetic Diabetic  Oral Supplement Order: none  Appetite/Oral Intake: good/not applicable  Factors Affecting Nutritional Intake: none identified  Food/[place]/Cultural Preferences:  strawberry or vanilla ONS  Food Allergies: none reported    Skin Integrity: excoriation  Wound(s):   partial thickness tissue loss noted    Comments    3/9/23: Noted unsure wt loss PTA. Pt confirmed unsure wt loss. Current wt less than stated UBW. Pt unaware that wt loss had occurred, so unsure of time frame. Did confirm that clothing fits more loosely, so wt loss likely. Stated appetite good, awaiting first meal. Offered ONS, pt agreeable (strawberry or vanilla flavor.)     Anthropometrics    Height: 5' 9" (175.3 cm) Height Method: Stated  Last Weight: 89.5 kg (197 lb 5 oz) (23 1233) Weight Method: Bed Scale  BMI (Calculated): 29.1  BMI Classification: overweight (BMI 25-29.9)        Ideal Body Weight (IBW), Male: 160 lb     % Ideal Body Weight, Male (lb): 123.32 %                 Usual Body Weight (UBW), k.45 kg  % Usual Body Weight: 93.96  % Weight " Change From Usual Weight: -6.23 %  Usual Weight Provided By: patient    Wt Readings from Last 3 Encounters:   03/09/23 1233 89.5 kg (197 lb 5 oz)   03/08/23 1920 89.5 kg (197 lb 5 oz)   03/08/23 1056 104.3 kg (230 lb)   10/19/22 1323 102.1 kg (225 lb)   06/21/22 1111 90.3 kg (199 lb)      Weight Change(s) Since Admission:  Admit Weight: 104.3 kg (230 lb) (03/08/23 1056)  3/9/23: 89.5kg    Patient Education    Not applicable.    Monitoring & Evaluation     Dietitian will monitor energy intake.  Nutrition Risk/Follow-Up: low (follow-up in 5-7 days)  Patients assigned 'low nutrition risk' status do not qualify for a full nutritional assessment but will be monitored and re-evaluated in a 5-7 day time period. Please consult if re-evaluation needed sooner.

## 2023-03-09 NOTE — PLAN OF CARE
Problem: Occupational Therapy  Goal: Occupational Therapy Goal  Description: Goals to be met by: 4/9/23     Patient will increase functional independence with ADLs by performing:    UE Dressing with Modified [place].  LE Dressing with Modified [place].  Grooming while standing at sink with Modified [place].  Toileting from toilet with Modified [place] for hygiene and clothing management.   Bathing from  shower chair/bench with Modified [place].  Toilet transfer to toilet with Modified [place].    Outcome: Ongoing, Progressing

## 2023-03-09 NOTE — PT/OT/SLP EVAL
"Occupational Therapy   Evaluation    Name: John Mancia Jr.  MRN: [id number]  Admitting Diagnosis: <principal problem not specified>  Recent Surgery: * No surgery found *      Recommendations:     Discharge Recommendations:  (home with significant other assist and HH therapy)  Discharge Equipment Recommendations:   shower chair if appropriate- pt reports he has a small shower, HH may need to assess    Assessment:     John Mancia Jr. is a 72 y.o. male with a medical diagnosis of  hyperosmolar hyperglycemia state, generalized weakness, BRBR. He presents with fatigue, decreased endurance, agreeable and pleasant.. Performance deficits affecting function: weakness, impaired self care skills, impaired functional mobility, gait instability, impaired balance.      Rehab Prognosis: Good; patient would benefit from acute skilled OT services to address these deficits and reach maximum level of function.       Plan:     Patient to be seen 5 x/week to address the above listed problems via self-care/home management  Plan of Care Expires: 04/07/23  Plan of Care Reviewed with: patient    Subjective     Chief Complaint: "I need to use the bathroom"  Patient/Family Comments/goals: "get better"    Occupational Profile:  Living Environment: lives with significant other/room mate Carlyn who is home majority of time, no steps, tub/shower that is small is size.  Previous level of function: pt utilizes RW and BSC-able to clean bedside commode or Carlyn assists with this task as well.  Roles and Routines: Friend  Equipment Used at Home:  (pt utilizes RW, BSC)  Assistance upon Discharge: Carlyn    Pain/Comfort:  Pain Rating 1: 0/10    Patients cultural, spiritual, Sikh conflicts given the current situation:      Objective:     Communicated with: ATIF Morales prior to session.  Patient found HOB elevated with  (vital monitoring, SCDs) upon OT entry to room.    General Precautions: Standard, fall  Orthopedic Precautions: N/A  Braces: " N/A  Respiratory Status: Room air  120/52 HR 66.  Pt holding oxymask set on 3L in hand upon OT arrival.  O2 saturation in 90s on RA.  Decreasing at end of session therefore oxymask donned.      Occupational Performance:    Bed Mobility:    Patient completed Supine to Sit with minimum assistance    Functional Mobility/Transfers:  Patient completed Sit <> Stand Transfer with minimum assistance  with  rolling walker   Patient completed Toilet Transfer Step Transfer technique with minimum assistance with HHA   Functional Mobility: Pt rushing to get to toilet, RW taken away due to poor safety with use.  Min assist balance without RW; CGA with use of RW    Activities of Daily Living:  Grooming: stand by assistance    Lower Body Dressing: maximal assistance socks  Toileting: minimum assistance safety during BM hygiene    Cognitive/Visual Perceptual:  Cognitive/Psychosocial Skills:     -       Oriented to: Person, Place, Time, and Situation   -       Follows Commands/attention:Follows two-step commands    Physical Exam:  Upper Extremity Strength:    -       Right Upper Extremity: WFL  -       Left Upper Extremity: WFL      Treatment & Education:  Pt with good tolerance and pleasant throughout.  Pt expected to continue to gain strength and be able to discharge home with HH and Carlyn.      Patient left up in chair with all lines intact, call button in reach, and RN present    GOALS:   Multidisciplinary Problems       Occupational Therapy Goals          Problem: Occupational Therapy    Goal Priority Disciplines Outcome Interventions   Occupational Therapy Goal     OT, PT/OT Ongoing, Progressing    Description: Goals to be met by: 4/9/23     Patient will increase functional independence with ADLs by performing:    UE Dressing with Modified [place].  LE Dressing with Modified [place].  Grooming while standing at sink with Modified [place].  Toileting from toilet with Modified [place] for hygiene and clothing  management.   Bathing from  shower chair/bench with Modified [place].  Toilet transfer to toilet with Modified [place].                         History:     Past Medical History:   Diagnosis Date    Arthritis     Bipolar disorder, unspecified     CHF (congestive heart failure)     Coronary artery disease     Diabetes mellitus     GERD (gastroesophageal reflux disease)     High cholesterol     Hx of eye surgery     Hypertension     ST elevation (STEMI) myocardial infarction of unspecified site     Stroke          Past Surgical History:   Procedure Laterality Date    CORONARY ARTERY BYPASS GRAFT      KNEE SURGERY         Time Tracking:     OT Date of Treatment:    OT Start Time: 0919  OT Stop Time: 0956  OT Total Time (min): 37 min    Billable Minutes:Evaluation MOD    3/9/2023

## 2023-03-09 NOTE — PLAN OF CARE
Problem: Adult Inpatient Plan of Care  Goal: Plan of Care Review  Outcome: Ongoing, Progressing  Flowsheets (Taken 3/9/2023 0217)  Plan of Care Reviewed With: patient  Goal: Patient-Specific Goal (Individualized)  Outcome: Ongoing, Progressing  Goal: Absence of Hospital-Acquired Illness or Injury  Outcome: Ongoing, Progressing  Intervention: Identify and Manage Fall Risk  Flowsheets (Taken 3/9/2023 0217)  Safety Promotion/Fall Prevention:   assistive device/personal item within reach   Fall Risk reviewed with patient/family   Fall Risk signage in place   lighting adjusted   medications reviewed   pulse ox   side rails raised x 3   room near unit station  Intervention: Prevent Skin Injury  Flowsheets (Taken 3/9/2023 0217)  Skin Protection:   adhesive use limited   incontinence pads utilized   silicone foam dressing in place  Intervention: Prevent Infection  Flowsheets (Taken 3/9/2023 0217)  Infection Prevention:   environmental surveillance performed   equipment surfaces disinfected   hand hygiene promoted   personal protective equipment utilized   rest/sleep promoted   single patient room provided  Goal: Optimal Comfort and Wellbeing  Outcome: Ongoing, Progressing  Intervention: Monitor Pain and Promote Comfort  Flowsheets (Taken 3/9/2023 0217)  Pain Management Interventions:   pillow support provided   position adjusted   quiet environment facilitated   relaxation techniques promoted  Intervention: Provide Person-Centered Care  Flowsheets (Taken 3/9/2023 0217)  Trust Relationship/Rapport:   care explained   choices provided   questions answered  Goal: Readiness for Transition of Care  Outcome: Ongoing, Progressing     Problem: Impaired Wound Healing  Goal: Optimal Wound Healing  Outcome: Ongoing, Progressing  Intervention: Promote Wound Healing  Flowsheets (Taken 3/9/2023 0217)  Sleep/Rest Enhancement:   awakenings minimized   noise level reduced   regular sleep/rest pattern promoted   relaxation techniques  promoted   room darkened  Pain Management Interventions:   pillow support provided   position adjusted   quiet environment facilitated   relaxation techniques promoted     Problem: Skin Injury Risk Increased  Goal: Skin Health and Integrity  Outcome: Ongoing, Progressing  Intervention: Optimize Skin Protection  Flowsheets (Taken 3/9/2023 0217)  Pressure Reduction Techniques:   frequent weight shift encouraged   positioned off wounds   pressure points protected   weight shift assistance provided  Pressure Reduction Devices:   positioning supports utilized   heel offloading device utilized   specialty bed utilized   pressure-redistributing mattress utilized  Skin Protection:   adhesive use limited   incontinence pads utilized   silicone foam dressing in place  Head of Bed (HOB) Positioning: HOB at 30-45 degrees

## 2023-03-09 NOTE — NURSING
Nurses Note -- 4 Eyes      3/9/2023   2:16 AM      Skin assessed during: Admit      [] No Pressure Injuries Present    []Prevention Measures Documented      [x] Yes- Altered Skin Integrity Present or Discovered   [x] LDA Added if Not in Epic (Describe Wound)   [x] New Altered Skin Integrity was Present on Admit and Documented in LDA   [x] Wound Image Taken    Wound Care Consulted? Yes    Attending Nurse:  Luisa Dotson RN     Second RN/Staff Member:  Yanira Bernstein RN

## 2023-03-09 NOTE — H&P
[facility] Hospital Medicine History & Physical Examination       Patient Name: John Mancia Jr.  MRN: [id number]  Patient Class: IP- Inpatient   Admission Date: 03/09/2023   Admitting Service: Hospital Medicine   Length of Stay: 1  Attending Physician: Dr. Jackson  Primary Care Provider: Magda Hernandez MD  Face-to-Face encounter date: 03/09/2023  Code Status:  Full code  Chief Complaint: Fatigue (Pt c/o generalized weakness and bright red blood in stool for several weeks. CBG >500)    Source of Information: Patient. Medical Records      HISTORY OF PRESENT ILLNESS:   John Mancia Jr. is a 72 y.o. male with a PMHx of HTN, HLD, CAD status post MI/CABG, IDDM2, GERD, CVA, bipolar disorder, osteoarthritis who presented to [facility] on 3/8/2023 with c/o generalized weakness and bright red blood in stool x1 week.  Denied chest pain.  EMS reported initial CBGS was greater than 500, and the patient was confused.  ED workup was consistent with HHS, blood glucose was 1043.  The patient was started on insulin drip and admitted to ICU.  Insulin drip has since been discontinued.  Mental status improved.  He was cleared for downgrade to the floor on 03/09/2023.  Hospital medicine services consulted for assumption of care and further medical management.    REVIEW OF SYSTEMS:   Except as documented, all other systems reviewed and negative     PAST MEDICAL HISTORY:   HTN, HLD, CAD status post MI/CABG, IDDM2, GERD, CVA, bipolar disorder, osteoarthritis    PAST SURGICAL HISTORY:   CABG   Knee surgery    FAMILY HISTORY:   Reviewed and negative    SOCIAL HISTORY:   Denied alcohol, tobacco or illicit drug use    ALLERGIES:   Latex    HOME MEDICATIONS:     Prior to Admission medications    Medication Sig Start Date End Date Taking? Authorizing Provider   aspirin (ECOTRIN) 81 MG EC tablet Take 81 mg by mouth.    Historical Provider   coffee xt-phosphatidyl serine 50-50 mg Chew Take 1 tablet by mouth  once daily.    Historical Provider   colesevelam (WELCHOL) 625 mg tablet Take 1,875 mg by mouth.    Historical Provider   cyanocobalamin 500 MCG tablet Take 500 mcg by mouth.    Historical Provider   evolocumab (REPATHA PUSHTRONEX) 420 mg/3.5 mL Injt Inject 420 mg into the skin. 4/30/22   Historical Provider   fenofibrate micronized (ANTARA) 130 MG capsule Take 130 mg by mouth. 7/20/22   Historical Provider   insulin glargine 100 units/mL SubQ pen Inject 77 Units into the skin. 1/27/23   Historical Provider   magnesium oxide (MAG-OX) 400 mg (241.3 mg magnesium) tablet Take 1 tablet by mouth. 12/9/22   Historical Provider   nebivoloL (BYSTOLIC) 5 MG Tab Take 7.5 mg by mouth once daily. 1/11/23   Historical Provider   traZODone (DESYREL) 50 MG tablet Take 25 mg by mouth every evening. 10/3/22   Historical Provider   VICTOZA 3-FREDDY 0.6 mg/0.1 mL (18 mg/3 mL) PnIj pen Inject 1.8 mg into the skin once daily. 2/7/23   Historical Provider     ________________________________________________________________________  INPATIENT LIST OF MEDICATIONS     Current Facility-Administered Medications:     0.9%  NaCl infusion (for blood administration), , Intravenous, Q24H PRN, Elian Bennett MD    aspirin EC tablet 81 mg, 81 mg, Oral, Daily, NAINA Oliver MD, 81 mg at 03/09/23 1012    calcium gluconate 1 g in NS IVPB (premixed), 1 g, Intravenous, PRN, Joselin Barrera MD    calcium gluconate 1 g in NS IVPB (premixed), 2 g, Intravenous, PRN, Joselin Barrera MD    calcium gluconate 1 g in NS IVPB (premixed), 3 g, Intravenous, PRN, Joselin Barrera MD    colesevelam tablet 1,875 mg, 1,875 mg, Oral, Daily with breakfast, NAINA Oliver MD, 1,875 mg at 03/09/23 1011    cyanocobalamin tablet 500 mcg, 500 mcg, Oral, Daily, NAINA Oliver MD, 500 mcg at 03/09/23 1012    dextrose 10% bolus 125 mL 125 mL, 12.5 g, Intravenous, PRN, Elian Bennett MD    dextrose 10% bolus 250 mL 250 mL, 25 g, Intravenous, PRN, Elian MARTE  MD Sabrina    dextrose 5 % and 0.45 % NaCl with KCl 20 mEq infusion, , Intravenous, Continuous PRN, Elian Bennett MD, Stopped at 03/09/23 1223    fenofibrate tablet 145 mg, 145 mg, Oral, Daily with breakfast, NAINA Oliver MD, 145 mg at 03/09/23 1012    glucagon (human recombinant) injection 1 mg, 1 mg, Intramuscular, PRN, Elian Bennett MD    glucose chewable tablet 16 g, 16 g, Oral, PRN, Elian Bennett MD    glucose chewable tablet 32 g, 32 g, Oral, PRN, Elian Bennett MD    heparin (porcine) injection 5,000 Units, 5,000 Units, Subcutaneous, Q12H, Reece Shaver DO, 5,000 Units at 03/09/23 0822    insulin aspart U-100 injection 1-10 Units, 1-10 Units, Subcutaneous, Q6H PRN, Joselin Barrera MD, 8 Units at 03/09/23 0822    insulin detemir U-100 injection 77 Units, 77 Units, Subcutaneous, Daily, NAINA Oliver MD, 77 Units at 03/09/23 1013    liraglutide 0.6 mg/0.1 mL (18 mg/3 mL) subq PNIJ pen 1.8 mg, 1.8 mg, Subcutaneous, Daily, NAINA Oliver MD    magnesium oxide tablet 400 mg, 400 mg, Oral, Daily, NAINA Oliver MD, 400 mg at 03/09/23 1012    magnesium sulfate 2g in water 50mL IVPB (premix), 2 g, Intravenous, PRN, Joselin Barrera MD    magnesium sulfate 2g in water 50mL IVPB (premix), 4 g, Intravenous, PRN, Joselin Barrera MD    metoprolol tartrate (LOPRESSOR) tablet 50 mg, 50 mg, Oral, BID, NAINA Oliver MD    miconazole NITRATE 2 % top powder, , Topical (Top), BID, Attila Garcia Jr., MD, FCCP, Given at 03/09/23 1012    pantoprazole EC tablet 40 mg, 40 mg, Oral, BID AC, Reece Shaver DO, 40 mg at 03/09/23 0612    potassium chloride 20 mEq in 100 mL IVPB (FOR CENTRAL LINE ADMINISTRATION ONLY), 40 mEq, Intravenous, PRN, Attila Garcia Jr., [facility], [facility]    potassium chloride 20 mEq in 100 mL IVPB (FOR CENTRAL LINE ADMINISTRATION ONLY), 60 mEq, Intravenous, PRN, Attila Garcia Jr., [facility], [facility], Last Rate: 50 mL/hr at 03/08/23 2143, 60 mEq at 03/08/23 2143    potassium  chloride 20 mEq in 100 mL IVPB (FOR CENTRAL LINE ADMINISTRATION ONLY), 80 mEq, Intravenous, PRN, [facility]    sodium chloride 0.9% flush 10 mL, 10 mL, Intravenous, PRN, Reece Shaver DO    sodium phosphate 15 mmol in dextrose 5 % (D5W) 250 mL IVPB, 15 mmol, Intravenous, PRN, Joselin Barrera MD    sodium phosphate 20.01 mmol in dextrose 5 % (D5W) 250 mL IVPB, 20.01 mmol, Intravenous, PRN, Joselin Barrera MD    sodium phosphate 30 mmol in dextrose 5 % (D5W) 250 mL IVPB, 30 mmol, Intravenous, PRN, Joselin Barrera MD    trazodone split tablet 25 mg, 25 mg, Oral, QHS, NAINA Oliver MD    zinc oxide-cod liver oil 40 % paste, , Topical (Top), BID, [facility]til[facility] Garcia Jr[facility], Given at 03/09/23 1012    Scheduled Meds:   aspirin  81 mg Oral Daily    colesevelam  1,875 mg Oral Daily with breakfast    cyanocobalamin  500 mcg Oral Daily    fenofibrate  145 mg Oral Daily with breakfast    heparin (porcine)  5,000 Units Subcutaneous Q12H    insulin detemir U-100  77 Units Subcutaneous Daily    liraglutide 0.6 mg/0.1 mL (18 mg/3 mL) subq PNIJ  1.8 mg Subcutaneous Daily    magnesium oxide  400 mg Oral Daily    metoprolol tartrate  50 mg Oral BID    miconazole NITRATE 2 %   Topical (Top) BID    pantoprazole  40 mg Oral BID AC    traZODone  25 mg Oral QHS    zinc oxide-cod liver oil   Topical (Top) BID     Continuous Infusions:   dextrose 5 % and 0.45 % NaCl with KCl 20 mEq Stopped (03/09/23 1223)     PRN Meds:.sodium chloride, calcium gluconate IVPB, calcium gluconate IVPB, calcium gluconate IVPB, dextrose 10%, dextrose 10%, dextrose 5 % and 0.45 % NaCl with KCl 20 mEq, glucagon (human recombinant), glucose, glucose, insulin aspart U-100, magnesium sulfate IVPB, magnesium sulfate IVPB, potassium chloride in water, potassium chloride in water, potassium chloride in water, sodium chloride 0.9%, sodium phosphate IVPB, sodium phosphate IVPB, sodium phosphate IVPB    PHYSICAL EXAM:     VITAL SIGNS:  24 HRS MIN & MAX LAST   Temp  Min: 97.9 °F (36.6 °C)  Max: 98.7 °F (37.1 °C) 98.1 °F (36.7 °C)   BP  Min: 92/48  Max: 146/59 118/61   Pulse  Min: 49  Max: 83  73   Resp  Min: 9  Max: 22 18   SpO2  Min: 73 %  Max: 100 % 95 %       General appearance: Well-developed male in no apparent distress.  HENT: Atraumatic head. Moist mucous membranes of oral cavity.  Eyes: Normal extraocular movements.   Neck: Supple.   Lungs: Clear to auscultation bilaterally.   Heart: Regular rate and rhythm. S1 and S2 present. No pedal edema.  Abdomen: Soft, non-distended, non-tender.  Extremities: No cyanosis, clubbing, or edema.  Skin: No Rash.   Neuro: Motor and sensory exams grossly intact.   Psych/mental status: Appropriate mood and affect. Responds appropriately to questions.     LABS AND IMAGING:     Recent Labs   Lab 03/08/23  1317 03/09/23  0017   WBC 8.5 8.3   RBC 5.74 4.83   HGB 17.3 14.7   HCT 49.6 42.7   MCV 86.4 88.4   MCH 30.1 30.4   MCHC 34.9 34.4   RDW 12.5 12.8    136   MPV 12.0* 11.4*       Recent Labs   Lab 03/08/23  1231 03/08/23  1317 03/08/23  1943 03/09/23  0017 03/09/23  0411 03/09/23  0854 03/09/23  1221   NA  --  129*   < > 145 142 141 134*   K  --  5.6*   < > 4.0 4.5 4.8 4.7   CO2  --  25   < > 29 29 30 24   BUN  --  18.4   < > 12.9 11.7 10.4 11.4   CREATININE  --  1.76*   < > 0.90 0.87 0.96 1.03   CALCIUM  --  10.5*   < > 8.8 8.6* 8.6* 8.3*   PH 7.52*  --   --   --   --   --   --    MG  --  2.50   < > 2.00 2.00 1.90 1.70   ALBUMIN  --  3.8  --  2.8*  --   --   --    ALKPHOS  --  96  --  70  --   --   --    ALT  --  29  --  20  --   --   --    AST  --  15  --  16  --   --   --    BILITOT  --  0.9  --  0.6  --   --   --     < > = values in this interval not displayed.       Microbiology Results (last 7 days)       Procedure Component Value Units Date/Time    Fungal Culture Blood [[id number]] Collected: 03/08/23 1628    Order Status: Sent Specimen: Blood Updated: 03/08/23 1636    Blood Culture [[id number]]  Collected: 03/08/23 1317    Order Status: Resulted Specimen: Blood Updated: 03/08/23 1326    Blood Culture [[id number]] Collected: 03/08/23 1317    Order Status: Resulted Specimen: Blood Updated: 03/08/23 1326             X-Ray Chest 1 View  Narrative: EXAMINATION:  XR CHEST 1 VIEW    CLINICAL HISTORY:  , Altered mental status, unspecified.    COMPARISON:  October 25, 2016    FINDINGS:  No alveolar consolidation, effusion, or pneumothorax is seen.   The thoracic aorta is normal  cardiac silhouette, central pulmonary vessels and mediastinum are normal in size and are grossly unremarkable.   visualized osseous structures are grossly unremarkable.  Impression: No acute chest disease is identified.    Electronically signed by: Mamadou Hollingsworth  Date:    03/08/2023  Time:    12:38  CT Head Without Contrast  Narrative: EXAMINATION:  CT HEAD WITHOUT CONTRAST    CLINICAL HISTORY:  Mental status change, unknown cause;    TECHNIQUE:  Multiple axial images were obtained from the base of the brain to the vertex without contrast administration.  Sagittal and coronal reconstructions were performed..Automatic exposure control is utilized to reduce patient radiation exposure.    COMPARISON:  02/22/2019    FINDINGS:  There is no intracranial mass or lesion seen.  No hemorrhage is seen.  No acute infarct is seen.  There is some areas of basal ganglia calcification bilaterally which are stable.  Some periventricular areas of mild calcification is seen as well which are also stable since prior examination.  There is some cerebral atrophy seen.  There is some compensatory ventricular dilatation and periventricular white matter changes consistent with the patient's age.  Calvarium is intact.  The posterior fossa is unremarkable..  The visualized portions of the paranasal sinuses show no acute abnormality.  Impression: Chronic age-related changes.  No acute process    Electronically signed by: Karen  Sachasinh  Date:    03/08/2023  Time:    12:23        ASSESSMENT & PLAN:     HHS - Resolved  IDDM 2 with Hyperglycemia  Hypophosphatemia  Essential HTN  HLD  Bipolar Disorder  CAD s/p CABG  Hx of CVA    Plan:  Home DM medications resumed prior to downgrade  Diabetic Diet  CBGs with ISS  Continue insulin detemir 77 units daily  Resume other appropriate home medications  Labs in AM      VTE Prophylaxis: SCDs    Discharge Planning and Disposition: TBD    I, Velma Tovar, NP have reviewed and discussed the case with Dr. Jackson.  Please see the attending MD's addendum for further assessment and plan.    Velma Tovar, AGACNP-BC  03/09/2023    Dr jackson- chart reviewed and pt examined. Pt downgraded from icu  for hyperosmolar  hyperglycemic state. LOC at baseline. Already on  detemir. Hemoglobin and hematocrit are stable. Agree with assessment and plans done by  NP Ms Tovar. Electrolytes wnl. Will get labs for am. Pt w hx of bipolar disorder.       All diagnosis and differential diagnosis have been reviewed; assessment and plan has been documented; I have personally reviewed the labs and test results that are presently available; I have reviewed the patients medication list; I have reviewed the consulting providers response and recommendations. I have reviewed or attempted to review medical records based upon their availability.    All of the patient and family questions have been addressed and answered. Patient's is agreeable to the above stated plan. I will continue to monitor closely and make adjustments to medical management as needed.      03/09/2023

## 2023-03-09 NOTE — PLAN OF CARE
03/09/23 1133   Discharge Assessment   Assessment Type Discharge Planning Assessment   Confirmed/corrected address, phone number and insurance Yes   Confirmed Demographics Correct on Facesheet   Source of Information patient   When was your last doctors appointment?   (PCP: Dr. Hernandez)   Communicated GUTIERREZ with patient/caregiver Date not available/Unable to determine   Reason For Admission AMS   People in Home friend(s)   Do you expect to return to your current living situation? Yes   Do you have help at home or someone to help you manage your care at home? Yes   Who are your caregiver(s) and their phone number(s)? friend Carlyn Antonio   Prior to hospitilization cognitive status: Unable to Assess   Current cognitive status: Alert/Oriented   Walking or Climbing Stairs ambulation difficulty, requires equipment   Mobility Management walker   Home Accessibility wheelchair accessible   Home Layout Able to live on 1st floor   Equipment Currently Used at Home walker, rolling;glucometer   Readmission within 30 days? No   Patient currently being followed by outpatient case management? No   Do you currently have service(s) that help you manage your care at home? No   Do you take prescription medications? Yes  (fills rx at [facility] in [place])   Do you have prescription coverage? Yes   Coverage [employer]/Medicaid   Do you have any problems affording any of your prescribed medications? No   Is the patient taking medications as prescribed? yes   How do you get to doctors appointments? car, drives self   Are you on dialysis? No   Do you take coumadin? No   Discharge Plan A Home   Discharge Plan B Home Health   DME Needed Upon Discharge  none   Discharge Plan discussed with: Patient   Discharge Barriers Identified None     Patient states he is IADL's and drives himself and roommate to appointments. Discussed option of HH services with pt, he declines at this time. Will continue following for dc needs.

## 2023-03-10 LAB
ALBUMIN SERPL-MCNC: 2.6 G/DL (ref 3.4–4.8)
ALBUMIN/GLOB SERPL: 0.9 RATIO (ref 1.1–2)
ALP SERPL-CCNC: 68 UNIT/L (ref 40–150)
ALT SERPL-CCNC: 18 UNIT/L (ref 0–55)
AST SERPL-CCNC: 16 UNIT/L (ref 5–34)
BASOPHILS # BLD AUTO: 0.02 X10(3)/MCL (ref 0–0.2)
BASOPHILS NFR BLD AUTO: 0.3 %
BILIRUBIN DIRECT+TOT PNL SERPL-MCNC: 0.5 MG/DL
BUN SERPL-MCNC: 9.8 MG/DL (ref 8.4–25.7)
CALCIUM SERPL-MCNC: 8.3 MG/DL (ref 8.8–10)
CHLORIDE SERPL-SCNC: 104 MMOL/L (ref 98–107)
CO2 SERPL-SCNC: 26 MMOL/L (ref 23–31)
CREAT SERPL-MCNC: 0.81 MG/DL (ref 0.73–1.18)
EOSINOPHIL # BLD AUTO: 0.16 X10(3)/MCL (ref 0–0.9)
EOSINOPHIL NFR BLD AUTO: 2.4 %
ERYTHROCYTE [DISTWIDTH] IN BLOOD BY AUTOMATED COUNT: 12.8 % (ref 11.5–17)
EST. AVERAGE GLUCOSE BLD GHB EST-MCNC: ABNORMAL MG/DL
GFR SERPLBLD CREATININE-BSD FMLA CKD-EPI: >60 MLS/MIN/1.73/M2
GLOBULIN SER-MCNC: 2.8 GM/DL (ref 2.4–3.5)
GLUCOSE SERPL-MCNC: 216 MG/DL (ref 82–115)
HBA1C MFR BLD: >14 %
HCT VFR BLD AUTO: 40.6 % (ref 42–52)
HGB BLD-MCNC: 13.9 G/DL (ref 14–18)
IMM GRANULOCYTES # BLD AUTO: 0.03 X10(3)/MCL (ref 0–0.04)
IMM GRANULOCYTES NFR BLD AUTO: 0.5 %
LYMPHOCYTES # BLD AUTO: 2.34 X10(3)/MCL (ref 0.6–4.6)
LYMPHOCYTES NFR BLD AUTO: 35.5 %
MAGNESIUM SERPL-MCNC: 1.5 MG/DL (ref 1.6–2.6)
MCH RBC QN AUTO: 30.3 PG
MCHC RBC AUTO-ENTMCNC: 34.2 G/DL (ref 33–36)
MCV RBC AUTO: 88.6 FL (ref 80–94)
MONOCYTES # BLD AUTO: 0.53 X10(3)/MCL (ref 0.1–1.3)
MONOCYTES NFR BLD AUTO: 8 %
NEUTROPHILS # BLD AUTO: 3.52 X10(3)/MCL (ref 2.1–9.2)
NEUTROPHILS NFR BLD AUTO: 53.3 %
NRBC BLD AUTO-RTO: 0 %
PHOSPHATE SERPL-MCNC: 2.4 MG/DL (ref 2.3–4.7)
PLATELET # BLD AUTO: 119 X10(3)/MCL (ref 130–400)
PMV BLD AUTO: 11.6 FL (ref 7.4–10.4)
POCT GLUCOSE: 187 MG/DL (ref 70–110)
POCT GLUCOSE: 195 MG/DL (ref 70–110)
POCT GLUCOSE: 274 MG/DL (ref 70–110)
POCT GLUCOSE: 282 MG/DL (ref 70–110)
POCT GLUCOSE: 286 MG/DL (ref 70–110)
POCT GLUCOSE: 381 MG/DL (ref 70–110)
POCT GLUCOSE: >500 MG/DL (ref 70–110)
POTASSIUM SERPL-SCNC: 3.9 MMOL/L (ref 3.5–5.1)
PROT SERPL-MCNC: 5.4 GM/DL (ref 5.8–7.6)
RBC # BLD AUTO: 4.58 X10(6)/MCL (ref 4.7–6.1)
SODIUM SERPL-SCNC: 137 MMOL/L (ref 136–145)
WBC # SPEC AUTO: 6.6 X10(3)/MCL (ref 4.5–11.5)

## 2023-03-10 PROCEDURE — 63600175 PHARM REV CODE 636 W HCPCS: Performed by: INTERNAL MEDICINE

## 2023-03-10 PROCEDURE — 85025 COMPLETE CBC W/AUTO DIFF WBC: CPT | Performed by: STUDENT IN AN ORGANIZED HEALTH CARE EDUCATION/TRAINING PROGRAM

## 2023-03-10 PROCEDURE — 80053 COMPREHEN METABOLIC PANEL: CPT | Performed by: STUDENT IN AN ORGANIZED HEALTH CARE EDUCATION/TRAINING PROGRAM

## 2023-03-10 PROCEDURE — 83735 ASSAY OF MAGNESIUM: CPT | Performed by: INTERNAL MEDICINE

## 2023-03-10 PROCEDURE — 63600175 PHARM REV CODE 636 W HCPCS: Performed by: STUDENT IN AN ORGANIZED HEALTH CARE EDUCATION/TRAINING PROGRAM

## 2023-03-10 PROCEDURE — 97116 GAIT TRAINING THERAPY: CPT | Mod: CQ

## 2023-03-10 PROCEDURE — 93010 ELECTROCARDIOGRAM REPORT: CPT | Mod: ,,, | Performed by: INTERNAL MEDICINE

## 2023-03-10 PROCEDURE — 93005 ELECTROCARDIOGRAM TRACING: CPT

## 2023-03-10 PROCEDURE — 97535 SELF CARE MNGMENT TRAINING: CPT | Mod: CO

## 2023-03-10 PROCEDURE — 25000003 PHARM REV CODE 250: Performed by: STUDENT IN AN ORGANIZED HEALTH CARE EDUCATION/TRAINING PROGRAM

## 2023-03-10 PROCEDURE — 93010 EKG 12-LEAD: ICD-10-PCS | Mod: ,,, | Performed by: INTERNAL MEDICINE

## 2023-03-10 PROCEDURE — 83036 HEMOGLOBIN GLYCOSYLATED A1C: CPT | Performed by: INTERNAL MEDICINE

## 2023-03-10 PROCEDURE — 25000003 PHARM REV CODE 250: Performed by: INTERNAL MEDICINE

## 2023-03-10 PROCEDURE — 84100 ASSAY OF PHOSPHORUS: CPT | Performed by: INTERNAL MEDICINE

## 2023-03-10 PROCEDURE — 21400001 HC TELEMETRY ROOM

## 2023-03-10 RX ORDER — MAGNESIUM SULFATE 1 G/100ML
1 INJECTION INTRAVENOUS ONCE
Status: COMPLETED | OUTPATIENT
Start: 2023-03-10 | End: 2023-03-10

## 2023-03-10 RX ORDER — METFORMIN HYDROCHLORIDE 500 MG/1
500 TABLET ORAL 2 TIMES DAILY WITH MEALS
Status: DISCONTINUED | OUTPATIENT
Start: 2023-03-10 | End: 2023-03-11 | Stop reason: HOSPADM

## 2023-03-10 RX ADMIN — MICONAZOLE NITRATE: 20 POWDER TOPICAL at 08:03

## 2023-03-10 RX ADMIN — METOPROLOL TARTRATE 50 MG: 50 TABLET, FILM COATED ORAL at 08:03

## 2023-03-10 RX ADMIN — PANTOPRAZOLE SODIUM 40 MG: 40 TABLET, DELAYED RELEASE ORAL at 05:03

## 2023-03-10 RX ADMIN — CYANOCOBALAMIN TAB 500 MCG 500 MCG: 500 TAB at 08:03

## 2023-03-10 RX ADMIN — INSULIN ASPART 1 UNITS: 100 INJECTION, SOLUTION INTRAVENOUS; SUBCUTANEOUS at 12:03

## 2023-03-10 RX ADMIN — ASPIRIN 81 MG: 81 TABLET, COATED ORAL at 08:03

## 2023-03-10 RX ADMIN — Medication 400 MG: at 08:03

## 2023-03-10 RX ADMIN — TRAZODONE HYDROCHLORIDE 25 MG: 50 TABLET ORAL at 10:03

## 2023-03-10 RX ADMIN — METFORMIN HYDROCHLORIDE 500 MG: 500 TABLET, FILM COATED ORAL at 04:03

## 2023-03-10 RX ADMIN — INSULIN ASPART 6 UNITS: 100 INJECTION, SOLUTION INTRAVENOUS; SUBCUTANEOUS at 10:03

## 2023-03-10 RX ADMIN — MAGNESIUM SULFATE IN DEXTROSE 1 G: 10 INJECTION, SOLUTION INTRAVENOUS at 08:03

## 2023-03-10 RX ADMIN — HEPARIN SODIUM 5000 UNITS: 5000 INJECTION, SOLUTION INTRAVENOUS; SUBCUTANEOUS at 10:03

## 2023-03-10 RX ADMIN — METOPROLOL TARTRATE 50 MG: 50 TABLET, FILM COATED ORAL at 10:03

## 2023-03-10 RX ADMIN — PANTOPRAZOLE SODIUM 40 MG: 40 TABLET, DELAYED RELEASE ORAL at 04:03

## 2023-03-10 RX ADMIN — INSULIN ASPART 2 UNITS: 100 INJECTION, SOLUTION INTRAVENOUS; SUBCUTANEOUS at 05:03

## 2023-03-10 RX ADMIN — FENOFIBRATE 145 MG: 145 TABLET, FILM COATED ORAL at 08:03

## 2023-03-10 RX ADMIN — Medication: at 08:03

## 2023-03-10 RX ADMIN — INSULIN ASPART 10 UNITS: 100 INJECTION, SOLUTION INTRAVENOUS; SUBCUTANEOUS at 12:03

## 2023-03-10 RX ADMIN — Medication: at 10:03

## 2023-03-10 RX ADMIN — INSULIN DETEMIR 77 UNITS: 100 INJECTION, SOLUTION SUBCUTANEOUS at 08:03

## 2023-03-10 RX ADMIN — MICONAZOLE NITRATE: 20 POWDER TOPICAL at 10:03

## 2023-03-10 RX ADMIN — HEPARIN SODIUM 5000 UNITS: 5000 INJECTION, SOLUTION INTRAVENOUS; SUBCUTANEOUS at 08:03

## 2023-03-10 NOTE — NURSING
Subjective:       Patient ID: John Mancia Jr. is a 72 y.o. male.    Chief Complaint: Fatigue (Pt c/o generalized weakness and bright red blood in stool for several weeks. CBG >500)    HPI  Review of Systems    Objective:      Physical Exam    Assessment:       1. Hyperosmolar hyperglycemic state (HHS)    2. Fatigue, unspecified type    3. Altered mental status    4. AMS (altered mental status)    5. Acute hyperglycemia    6. Yeast infection    7. ROBERT (acute kidney injury)    8. Melena    9. Lactic acidosis    10. Abnormal heart rhythm           Altered Skin Integrity 03/08/23 1325 Left Perineum  (Active)   03/08/23 1325   Altered Skin Integrity Present on Admission - Did Patient arrive to the hospital with altered skin?: yes   Side: Left   Orientation:    Location: Perineum   Wound Number:    Is this injury device related?:    Primary Wound Type:    Description of Altered Skin Integrity:    Ankle-Brachial Index:    Pulses:    Removal Indication and Assessment:    Wound Outcome:    (Retired) Wound Length (cm):    (Retired) Wound Width (cm):    (Retired) Depth (cm):    Wound Description (Comments):    Removal Indications:    Description of Altered Skin Integrity Partial thickness tissue loss. Shallow open ulcer with a red or pink wound bed, without slough. Intact or Open/Ruptured Serum-filled blister. 03/10/23 0800   Dressing Appearance Open to air 03/10/23 0800   Drainage Amount None 03/10/23 1200   Appearance Pink;Red 03/10/23 1200   Care Cleansed with:;Wound cleanser;Applied:;Skin Barrier 03/10/23 1200            Altered Skin Integrity 03/08/23 2000 medial Buttocks Partial thickness tissue loss. Shallow open ulcer with a red or pink wound bed, without slough. Intact or Open/Ruptured Serum-filled blister. (Active)   03/08/23 2000   Altered Skin Integrity Present on Admission - Did Patient arrive to the hospital with altered skin?: yes   Side:    Orientation: medial   Location: Buttocks   Wound Number:    Is this  injury device related?:    Primary Wound Type:    Description of Altered Skin Integrity: Partial thickness tissue loss. Shallow open ulcer with a red or pink wound bed, without slough. Intact or Open/Ruptured Serum-filled blister.   Ankle-Brachial Index:    Pulses:    Removal Indication and Assessment:    Wound Outcome:    (Retired) Wound Length (cm):    (Retired) Wound Width (cm):    (Retired) Depth (cm):    Wound Description (Comments):    Removal Indications:    Wound Image   03/09/23 1107   Description of Altered Skin Integrity Partial thickness tissue loss. Shallow open ulcer with a red or pink wound bed, without slough. Intact or Open/Ruptured Serum-filled blister. 03/10/23 0800   Dressing Appearance Open to air 03/10/23 0800   Drainage Amount None 03/10/23 0800   Drainage Characteristics/Odor No odor 03/09/23 1107   Appearance Red 03/10/23 0800   Periwound Area Dry 03/09/23 1107   Care Cleansed with:;Antimicrobial agent;Applied:;Skin Barrier 03/09/23 2200           Plan:            Re-eval of groin buttock redness- Much less redness noted.  Pt reports much more comfortable.  Currently up in chair.   .  No changes to care at this time.  Will recheck next week.

## 2023-03-10 NOTE — PROGRESS NOTES
[facility]  Hospital Medicine Progress Note        Chief Complaint: Inpatient Follow-up for hyperglycemia    HPI:   John Mancia Jr. is a 72 y.o. male with a PMHx of HTN, HLD, CAD status post MI/CABG, IDDM2, GERD, CVA, bipolar disorder, osteoarthritis who presented to [facility] on 3/8/2023 with c/o generalized weakness and bright red blood in stool x1 week.  Denied chest pain.  EMS reported initial CBGS was greater than 500, and the patient was confused.  ED workup was consistent with HHS, blood glucose was 1043.  The patient was started on insulin drip and admitted to ICU.  Insulin drip has since been discontinued.  Mental status improved.  He was cleared for downgrade to the floor on 03/09/2023.  Hospital medicine services consulted for assumption of care and further medical management.  Interval Hx:   Patient was seen and examined at bedside.  He reported his mentation is better denied any headache, vision changes.  Reported he is appetite has improved and actually looking for lunch tray and upset that it is still 10:00 a.m..    Objective/physical exam:  General: In no acute distress, afebrile  Chest: Clear to auscultation bilaterally  Heart: RRR, +S1, S2, no appreciable murmur  Abdomen: Soft, nontender, BS +  MSK: Warm, no lower extremity edema, no clubbing or cyanosis  Neurologic: Alert and oriented   VITAL SIGNS: 24 HRS MIN & MAX LAST   Temp  Min: 97.9 °F (36.6 °C)  Max: 98.6 °F (37 °C) 98.1 °F (36.7 °C)   BP  Min: 102/39  Max: 162/71 125/72   Pulse  Min: 54  Max: 147  (!) 54     Resp  Min: 14  Max: 21 18   SpO2  Min: 76 %  Max: 100 % 95 %       Recent Labs   Lab 03/08/23  1317 03/09/23  0017 03/10/23  0508   WBC 8.5 8.3 6.6   RBC 5.74 4.83 4.58*   HGB 17.3 14.7 13.9*   HCT 49.6 42.7 40.6*   MCV 86.4 88.4 88.6   MCH 30.1 30.4 30.3   MCHC 34.9 34.4 34.2   RDW 12.5 12.8 12.8    136 119*   MPV 12.0* 11.4* 11.6*       Recent Labs   Lab 03/08/23  1231 03/08/23  1317  03/08/23  1943 03/09/23  0017 03/09/23  0411 03/09/23  1221 03/09/23  1600 03/09/23  2155 03/10/23  0508   NA  --  129*   < > 145   < > 134* 135* 139 137   K  --  5.6*   < > 4.0   < > 4.7 4.2 3.9 3.9   CO2  --  25   < > 29   < > 24 23 27 26   BUN  --  18.4   < > 12.9   < > 11.4 13.3 11.8 9.8   CREATININE  --  1.76*   < > 0.90   < > 1.03 0.95 0.87 0.81   CALCIUM  --  10.5*   < > 8.8   < > 8.3* 8.3* 8.2* 8.3*   PH 7.52*  --   --   --   --   --   --   --   --    MG  --  2.50   < > 2.00   < > 1.70 1.70  --  1.50*   ALBUMIN  --  3.8  --  2.8*  --   --   --   --  2.6*   ALKPHOS  --  96  --  70  --   --   --   --  68   ALT  --  29  --  20  --   --   --   --  18   AST  --  15  --  16  --   --   --   --  16   BILITOT  --  0.9  --  0.6  --   --   --   --  0.5    < > = values in this interval not displayed.          Microbiology Results (last 7 days)       Procedure Component Value Units Date/Time    Blood Culture [[id number]]  (Normal) Collected: 03/08/23 1317    Order Status: Completed Specimen: Blood Updated: 03/09/23 1500     CULTURE, BLOOD ([facility]) No Growth At 24 Hours    Blood Culture [[id number]]  (Normal) Collected: 03/08/23 1317    Order Status: Completed Specimen: Blood Updated: 03/09/23 1500     CULTURE, BLOOD ([facility]) No Growth At 24 Hours    Fungal Culture Blood [[id number]] Collected: 03/08/23 1628    Order Status: Sent Specimen: Blood Updated: 03/08/23 1636             See below for Radiology    Scheduled Med:   aspirin  81 mg Oral Daily    colesevelam  1,875 mg Oral Daily with breakfast    cyanocobalamin  500 mcg Oral Daily    fenofibrate  145 mg Oral Daily with breakfast    heparin (porcine)  5,000 Units Subcutaneous Q12H    insulin detemir U-100  77 Units Subcutaneous Daily    liraglutide 0.6 mg/0.1 mL (18 mg/3 mL) subq PNIJ  1.8 mg Subcutaneous Daily    magnesium oxide  400 mg Oral Daily    metoprolol tartrate  50 mg Oral BID    miconazole NITRATE 2 %   Topical (Top) BID    pantoprazole  40 mg Oral BID AC     traZODone  25 mg Oral QHS    zinc oxide-cod liver oil   Topical (Top) BID        Continuous Infusions:       PRN Meds:  sodium chloride, calcium gluconate IVPB, calcium gluconate IVPB, calcium gluconate IVPB, dextrose 10%, dextrose 10%, glucagon (human recombinant), glucose, glucose, insulin aspart U-100, magnesium sulfate IVPB, magnesium sulfate IVPB, potassium chloride in water, potassium chloride in water, potassium chloride in water, sodium chloride 0.9%, sodium phosphate IVPB, sodium phosphate IVPB, sodium phosphate IVPB       Assessment/Plan:  HHS - Resolved  IDDM 2 with Hyperglycemia  Hypomagnesemia  Essential HTN  HLD  Bipolar Disorder  CAD s/p CABG  Hx of CVA     Plan:  Mentation has improved at baseline  Labs from 6:00 a.m. showed 216 glucose level patient did receive 77 units of long-acting yesterday morning.  And also received insulin on sliding scale.    Fingersticks showed uncontrolled glucose in the range of 280 and 380 prior having breakfast and lunch.  We will obtain A1c  Magnesium 1.5 noted, repleted with IV  We will cover with sliding scale and based on A1c and sugars later today patient would benefit from prandial insulin walter  We will monitor today for sugars and adjust insulin regimen prior to discharge   We will also start him on oral antidiabetic   Discussed with care team regarding diabetic education,   Patient needs referral for diabetes   Continue insulin detemir 77 units daily  Resume other appropriate home medications  Labs in AM       VTE prophylaxis:  Heparin subQ 5000    Patient condition:  Stable  Anticipated discharge and Disposition:   Likely home in the a.m.    _____________________________________________________________________    Nutrition Status:    Radiology:  X-Ray Chest 1 View  Narrative: EXAMINATION:  XR CHEST 1 VIEW    CLINICAL HISTORY:  , Altered mental status, unspecified.    COMPARISON:  October 25, 2016    FINDINGS:  No alveolar consolidation, effusion, or pneumothorax  is seen.   The thoracic aorta is normal  cardiac silhouette, central pulmonary vessels and mediastinum are normal in size and are grossly unremarkable.   visualized osseous structures are grossly unremarkable.  Impression: No acute chest disease is identified.    Electronically signed by: Mamadou Hollingsworth  Date:    03/08/2023  Time:    12:38  CT Head Without Contrast  Narrative: EXAMINATION:  CT HEAD WITHOUT CONTRAST    CLINICAL HISTORY:  Mental status change, unknown cause;    TECHNIQUE:  Multiple axial images were obtained from the base of the brain to the vertex without contrast administration.  Sagittal and coronal reconstructions were performed..Automatic exposure control is utilized to reduce patient radiation exposure.    COMPARISON:  02/22/2019    FINDINGS:  There is no intracranial mass or lesion seen.  No hemorrhage is seen.  No acute infarct is seen.  There is some areas of basal ganglia calcification bilaterally which are stable.  Some periventricular areas of mild calcification is seen as well which are also stable since prior examination.  There is some cerebral atrophy seen.  There is some compensatory ventricular dilatation and periventricular white matter changes consistent with the patient's age.  Calvarium is intact.  The posterior fossa is unremarkable..  The visualized portions of the paranasal sinuses show no acute abnormality.  Impression: Chronic age-related changes.  No acute process    Electronically signed by: Karen Fontenot  Date:    03/08/2023  Time:    12:23      Delma Lane MD   03/10/2023

## 2023-03-10 NOTE — PT/OT/SLP PROGRESS
Occupational Therapy  Treatment    John Mancia Jr.   MRN: [id number]   Admitting Diagnosis: <principal problem not specified>    OT Date of Treatment: 03/10/23   OT Start Time: 1107  OT Stop Time: 1118  OT Total Time (min): 11 min     Billable Minutes:  Self Care/Home Management 11  Total Minutes: 11     OT/YOSEPH: YOSEPH     YOSEPH Visit Number: 1    General Precautions: Standard, fall  Orthopedic Precautions:    Braces:      Spiritual, Cultural Beliefs, Quaker Practices, Values that Affect Care: no    Subjective:  Communicated with RN prior to session.         Objective:  Patient found with:  (UIC)    LE Dressing:  Min A donning underwear 2/2  socks. CGA-SBA for pulling underwear over hips    Toilet Training:  Void while standing. Poor safety awareness. Cues for proper use of RW and environmental safety hazards.       Patient left up in chair with all lines intact and call button in reach    ASSESSMENT:  John Mancia Jr. is a 72 y.o. male with a medical diagnosis of AMS, ROBERT. Limited by safety, discussed DC with HH. Declined HH despite encouragement and education. Reports all DME needs and walk in shower     Rehab potential is good    Activity tolerance: Good    Discharge recommendations: home with home health     Equipment recommendations:       GOALS:   Multidisciplinary Problems       Occupational Therapy Goals          Problem: Occupational Therapy    Goal Priority Disciplines Outcome Interventions   Occupational Therapy Goal     OT, PT/OT Ongoing, Progressing    Description: Goals to be met by: 4/9/23     Patient will increase functional independence with ADLs by performing:    UE Dressing with Modified [place].  LE Dressing with Modified [place].  Grooming while standing at sink with Modified [place].  Toileting from toilet with Modified [place] for hygiene and clothing management.   Bathing from  shower chair/bench with Modified [place].  Toilet transfer to toilet with Modified  [place].                         Plan:  Patient to be seen 5 x/week to address the above listed problems via self-care/home management, therapeutic activities, therapeutic exercises  Plan of Care expires: 04/07/23  Plan of Care reviewed with: patient         03/10/2023

## 2023-03-10 NOTE — NURSING
Nurses Note -- 4 Eyes      3/9/2023   10:44 PM      Skin assessed during: Transfer      [] No Pressure Injuries Present    []Prevention Measures Documented      [x] Yes- Altered Skin Integrity Present or Discovered   [] LDA Added if Not in Epic (Describe Wound)   [x] New Altered Skin Integrity was Present on Admit and Documented in LDA   [] Wound Image Taken    Wound Care Consulted? No, Patient already seen by wound care.    Attending Nurse:  Henny Tavarez RN     Second RN/Staff Member:  Abbi Michelle CNA

## 2023-03-10 NOTE — PT/OT/SLP PROGRESS
Physical Therapy Treatmente    Patient Name:  John Mancia Jr.   MRN:  [id number]    Recommendations:     Discharge Recommendations: home with home health  Discharge Equipment Recommendations: none  Barriers to discharge: None    Assessment:     John Mancia Jr. is a 72 y.o. male admitted with a medical diagnosis of diabetic hyperglycemic hyperosmolar syndrome (HHS); lactic acidosis .  He presents with the following impairments/functional limitations: weakness, impaired endurance, gait instability.    Rehab Prognosis: Good; patient would benefit from acute skilled PT services to address these deficits and reach maximum level of function.    Recent Surgery: * No surgery found *      Plan:     During this hospitalization, patient to be seen 5 x/week to address the identified rehab impairments via gait training, therapeutic activities and progress toward the following goals:    Plan of Care Expires:  23    Subjective     Chief Complaint: None  Patient/Family Comments/goals: None  Pain/Comfort:  0/10      Objective:     Pt found in bed upon entry.     General Precautions: Standard, fall  Orthopedic Precautions: N/A  Braces: N/A  Respiratory Status: Room air     Functional Mobility:  Bed Mobility:     Supine to Sit: independence  Gait: SBA,Mod I  Pt amb 500ft with step through gait pattern. Pt presents with steady kanu but presents with occassionally lateral excursion.   Dynamic Standing balance: SBA  Pt was able to make bed with no LOB    Patient left up in chair with call button in reach..    GOALS:   Multidisciplinary Problems       Physical Therapy Goals          Problem: Physical Therapy    Goal Priority Disciplines Outcome Goal Variances Interventions   Physical Therapy Goal     PT, PT/OT Ongoing, Progressing     Description: Goals to be met by: 23     Patient will increase functional independence with mobility by performin. Supine to sit with Stand-by Assistance  2. Sit to supine with  Stand-by Assistance  3. Sit to stand transfer with Modified [place]  4. Gait  x 200 feet with Modified [place] using No Assistive Device vs RW.                          Time Tracking:     PT Received On: 03/10/23  PT Start Time: 1332     PT Stop Time: 1348  PT Total Time (min): 16 min     Billable Minutes: Gait Training 16    Treatment Type: Treatment  PT/PTA: PTA     PTA Visit Number: 1     03/10/2023

## 2023-03-10 NOTE — PLAN OF CARE
03/10/23 1555   Discharge Assessment   Assessment Type Discharge Planning Assessment   Confirmed/corrected address, phone number and insurance No  ([place] [address])   Source of Information patient   When was your last doctors appointment?   (Patient reports last PCP appointment was 3 months ago.)   People in Home friend(s)   Do you expect to return to your current living situation? Yes   Do you have help at home or someone to help you manage your care at home? Yes   Current cognitive status: Unable to Assess   Equipment Currently Used at Home cane, straight;walker, rolling   Do you currently have service(s) that help you manage your care at home? No   Do you take prescription medications? Yes   Do you have prescription coverage? Yes   Do you have any problems affording any of your prescribed medications? No   How do you get to doctors appointments? car, drives self   Are you on dialysis? No   Discharge Plan A Home   Discharge Plan B Home   Discharge Plan discussed with: Patient   Housing Stability   In the last 12 months, was there a time when you were not able to pay the mortgage or rent on time? N   Transportation Needs   In the past 12 months, has lack of transportation kept you from medical appointments or from getting medications? no   In the past 12 months, has lack of transportation kept you from meetings, work, or from getting things needed for daily living? No     Spoke to patient about home health services. Patient declines home health.

## 2023-03-11 VITALS
SYSTOLIC BLOOD PRESSURE: 142 MMHG | OXYGEN SATURATION: 97 % | TEMPERATURE: 98 F | BODY MASS INDEX: 29.22 KG/M2 | HEIGHT: 69 IN | WEIGHT: 197.31 LBS | RESPIRATION RATE: 16 BRPM | DIASTOLIC BLOOD PRESSURE: 82 MMHG | HEART RATE: 60 BPM

## 2023-03-11 PROBLEM — R73.9 ACUTE HYPERGLYCEMIA: Status: ACTIVE | Noted: 2023-03-11

## 2023-03-11 LAB
ALBUMIN SERPL-MCNC: 2.6 G/DL (ref 3.4–4.8)
ALBUMIN/GLOB SERPL: 0.9 RATIO (ref 1.1–2)
ALP SERPL-CCNC: 62 UNIT/L (ref 40–150)
ALT SERPL-CCNC: 17 UNIT/L (ref 0–55)
AST SERPL-CCNC: 19 UNIT/L (ref 5–34)
BASOPHILS # BLD AUTO: 0.02 X10(3)/MCL (ref 0–0.2)
BASOPHILS NFR BLD AUTO: 0.3 %
BILIRUBIN DIRECT+TOT PNL SERPL-MCNC: 0.4 MG/DL
BUN SERPL-MCNC: 10.5 MG/DL (ref 8.4–25.7)
CALCIUM SERPL-MCNC: 8.4 MG/DL (ref 8.8–10)
CHLORIDE SERPL-SCNC: 104 MMOL/L (ref 98–107)
CO2 SERPL-SCNC: 26 MMOL/L (ref 23–31)
CREAT SERPL-MCNC: 0.77 MG/DL (ref 0.73–1.18)
EOSINOPHIL # BLD AUTO: 0.15 X10(3)/MCL (ref 0–0.9)
EOSINOPHIL NFR BLD AUTO: 2.6 %
ERYTHROCYTE [DISTWIDTH] IN BLOOD BY AUTOMATED COUNT: 12.8 % (ref 11.5–17)
GFR SERPLBLD CREATININE-BSD FMLA CKD-EPI: >60 MLS/MIN/1.73/M2
GLOBULIN SER-MCNC: 3 GM/DL (ref 2.4–3.5)
GLUCOSE SERPL-MCNC: 226 MG/DL (ref 82–115)
HCT VFR BLD AUTO: 42 % (ref 42–52)
HGB BLD-MCNC: 14.7 G/DL (ref 14–18)
IMM GRANULOCYTES # BLD AUTO: 0.02 X10(3)/MCL (ref 0–0.04)
IMM GRANULOCYTES NFR BLD AUTO: 0.3 %
LYMPHOCYTES # BLD AUTO: 2.6 X10(3)/MCL (ref 0.6–4.6)
LYMPHOCYTES NFR BLD AUTO: 45.1 %
MCH RBC QN AUTO: 30.4 PG
MCHC RBC AUTO-ENTMCNC: 35 G/DL (ref 33–36)
MCV RBC AUTO: 86.8 FL (ref 80–94)
MONOCYTES # BLD AUTO: 0.47 X10(3)/MCL (ref 0.1–1.3)
MONOCYTES NFR BLD AUTO: 8.1 %
NEUTROPHILS # BLD AUTO: 2.51 X10(3)/MCL (ref 2.1–9.2)
NEUTROPHILS NFR BLD AUTO: 43.6 %
NRBC BLD AUTO-RTO: 0 %
PLATELET # BLD AUTO: 129 X10(3)/MCL (ref 130–400)
PMV BLD AUTO: 12.2 FL (ref 7.4–10.4)
POCT GLUCOSE: 213 MG/DL (ref 70–110)
POTASSIUM SERPL-SCNC: 4 MMOL/L (ref 3.5–5.1)
PROT SERPL-MCNC: 5.6 GM/DL (ref 5.8–7.6)
RBC # BLD AUTO: 4.84 X10(6)/MCL (ref 4.7–6.1)
SODIUM SERPL-SCNC: 139 MMOL/L (ref 136–145)
WBC # SPEC AUTO: 5.8 X10(3)/MCL (ref 4.5–11.5)

## 2023-03-11 PROCEDURE — 25000003 PHARM REV CODE 250: Performed by: STUDENT IN AN ORGANIZED HEALTH CARE EDUCATION/TRAINING PROGRAM

## 2023-03-11 PROCEDURE — 63600175 PHARM REV CODE 636 W HCPCS: Performed by: STUDENT IN AN ORGANIZED HEALTH CARE EDUCATION/TRAINING PROGRAM

## 2023-03-11 PROCEDURE — 85025 COMPLETE CBC W/AUTO DIFF WBC: CPT | Performed by: STUDENT IN AN ORGANIZED HEALTH CARE EDUCATION/TRAINING PROGRAM

## 2023-03-11 PROCEDURE — 25000003 PHARM REV CODE 250: Performed by: INTERNAL MEDICINE

## 2023-03-11 PROCEDURE — 63600175 PHARM REV CODE 636 W HCPCS: Performed by: INTERNAL MEDICINE

## 2023-03-11 PROCEDURE — 80053 COMPREHEN METABOLIC PANEL: CPT | Performed by: STUDENT IN AN ORGANIZED HEALTH CARE EDUCATION/TRAINING PROGRAM

## 2023-03-11 RX ORDER — MICONAZOLE NITRATE 2 %
POWDER (GRAM) TOPICAL 2 TIMES DAILY
Qty: 71 G | Refills: 0 | Status: ON HOLD | OUTPATIENT
Start: 2023-03-11 | End: 2023-08-28 | Stop reason: HOSPADM

## 2023-03-11 RX ORDER — METFORMIN HYDROCHLORIDE 500 MG/1
500 TABLET ORAL 2 TIMES DAILY WITH MEALS
Qty: 60 TABLET | Refills: 0 | Status: ON HOLD | OUTPATIENT
Start: 2023-03-11 | End: 2023-08-28 | Stop reason: HOSPADM

## 2023-03-11 RX ADMIN — Medication 400 MG: at 09:03

## 2023-03-11 RX ADMIN — INSULIN DETEMIR 77 UNITS: 100 INJECTION, SOLUTION SUBCUTANEOUS at 09:03

## 2023-03-11 RX ADMIN — FENOFIBRATE 145 MG: 145 TABLET, FILM COATED ORAL at 09:03

## 2023-03-11 RX ADMIN — MICONAZOLE NITRATE: 20 POWDER TOPICAL at 09:03

## 2023-03-11 RX ADMIN — METFORMIN HYDROCHLORIDE 500 MG: 500 TABLET, FILM COATED ORAL at 06:03

## 2023-03-11 RX ADMIN — CYANOCOBALAMIN TAB 500 MCG 500 MCG: 500 TAB at 09:03

## 2023-03-11 RX ADMIN — PANTOPRAZOLE SODIUM 40 MG: 40 TABLET, DELAYED RELEASE ORAL at 06:03

## 2023-03-11 RX ADMIN — HEPARIN SODIUM 5000 UNITS: 5000 INJECTION, SOLUTION INTRAVENOUS; SUBCUTANEOUS at 09:03

## 2023-03-11 RX ADMIN — ASPIRIN 81 MG: 81 TABLET, COATED ORAL at 09:03

## 2023-03-11 RX ADMIN — Medication: at 09:03

## 2023-03-11 RX ADMIN — METOPROLOL TARTRATE 50 MG: 50 TABLET, FILM COATED ORAL at 09:03

## 2023-03-11 NOTE — DISCHARGE SUMMARY
[facility] Hospital Medicine Discharge Summary    Admit Date: 3/8/2023  Discharge Date and Time: 3/11/23 10:19 AM  Admitting Physician:  Team  Discharging Physician: Delma Lane MD.  Primary Care Physician: Magda Hernandez MD  Consults: None    Discharge Diagnoses:  HHS - Resolved  IDDM 2 with Hyperglycemia  Hypomagnesemia  Essential HTN  HLD  Bipolar Disorder  CAD s/p CABG  Hx of CVA    Hospital Course:   72 y.o. male with a PMHx of HTN, HLD, CAD status post MI/CABG, IDDM2, GERD, CVA, bipolar disorder, osteoarthritis who presented to [facility] on 3/8/2023 with c/o generalized weakness and bright red blood in stool x1 week.  Denied chest pain.  EMS reported initial CBGS was greater than 500, and the patient was confused.  ED workup was consistent with HHS, blood glucose was 1043.  The patient was started on insulin drip and admitted to ICU.  Insulin drip has since been discontinued.  Mental status improved.  He was cleared for downgrade to the floor on 03/09/2023.  Hospital medicine services consulted for assumption of care and further medical management.Obtained A1c >14%. Pt reported non complaince to his home insulin regimen, resumed his home lantus dose and initiated on metformin , with improved cbgs. Recommended compliance and close f/u with PCP. Pt was offered home health but pt refused home health.    Pt was seen and examined on the day of discharge,no complaints, remained hemodynamically stable, normal mentation, discharged to home.   Vitals:  VITAL SIGNS: 24 HRS MIN & MAX LAST   No data recorded 97.5 °F (36.4 °C)   No data recorded (!) 142/82     No data recorded  60     No data recorded 16   No data recorded 97 %       Physical Exam:  General: In no acute distress, afebrile  Chest: Clear to auscultation bilaterally  Heart: RRR, +S1, S2, no appreciable murmur  Abdomen: Soft, nontender, BS +  MSK: Warm, no lower extremity edema, no clubbing or cyanosis  Neurologic:  Alert and oriented     Procedures Performed: No admission procedures for hospital encounter.     Significant Diagnostic Studies: See Full reports for all details    No results for input(s): WBC, RBC, HGB, HCT, MCV, MCH, MCHC, RDW, PLT, MPV, GRAN, LYMPH, MONO, BASO, NRBC in the last 168 hours.      No results for input(s): NA, K, CL, CO2, ANIONGAP, BUN, CREATININE, GLU, CALCIUM, PH, MG, ALBUMIN, PROT, ALKPHOS, ALT, AST, BILITOT in the last 168 hours.       Microbiology Results (last 7 days)       Procedure Component Value Units Date/Time    Fungal Culture Blood [[id number]] Collected: 03/08/23 1628    Order Status: Sent Specimen: Blood Updated: 03/08/23 1636             X-Ray Chest 1 View  Narrative: EXAMINATION:  XR CHEST 1 VIEW    CLINICAL HISTORY:  , Altered mental status, unspecified.    COMPARISON:  October 25, 2016    FINDINGS:  No alveolar consolidation, effusion, or pneumothorax is seen.   The thoracic aorta is normal  cardiac silhouette, central pulmonary vessels and mediastinum are normal in size and are grossly unremarkable.   visualized osseous structures are grossly unremarkable.  Impression: No acute chest disease is identified.    Electronically signed by: Mamadou Hollingsworth  Date:    03/08/2023  Time:    12:38  CT Head Without Contrast  Narrative: EXAMINATION:  CT HEAD WITHOUT CONTRAST    CLINICAL HISTORY:  Mental status change, unknown cause;    TECHNIQUE:  Multiple axial images were obtained from the base of the brain to the vertex without contrast administration.  Sagittal and coronal reconstructions were performed..Automatic exposure control is utilized to reduce patient radiation exposure.    COMPARISON:  02/22/2019    FINDINGS:  There is no intracranial mass or lesion seen.  No hemorrhage is seen.  No acute infarct is seen.  There is some areas of basal ganglia calcification bilaterally which are stable.  Some periventricular areas of mild calcification is seen as well which are also stable since  prior examination.  There is some cerebral atrophy seen.  There is some compensatory ventricular dilatation and periventricular white matter changes consistent with the patient's age.  Calvarium is intact.  The posterior fossa is unremarkable..  The visualized portions of the paranasal sinuses show no acute abnormality.  Impression: Chronic age-related changes.  No acute process    Electronically signed by: Karen Fontenot  Date:    03/08/2023  Time:    12:23         Medication List        START taking these medications      metFORMIN 500 MG tablet  Commonly known as: GLUCOPHAGE  Take 1 tablet (500 mg total) by mouth 2 (two) times daily with meals.     miconazole NITRATE 2 % 2 % top powder  Commonly known as: MICOTIN  Apply topically 2 (two) times daily.     zinc oxide-cod liver oil 40 % Pste paste  Commonly known as: DESITIN  Apply topically 2 (two) times a day.            CONTINUE taking these medications      aspirin 81 MG EC tablet  Commonly known as: ECOTRIN     coffee xt-phosphatidyl serine 50-50 mg Chew     colesevelam 625 mg tablet  Commonly known as: WELCHOL     cyanocobalamin 500 MCG tablet     fenofibrate micronized 130 MG capsule  Commonly known as: ANTARA     insulin glargine 100 units/mL SubQ pen     magnesium oxide 400 mg (241.3 mg magnesium) tablet  Commonly known as: MAG-OX     nebivoloL 5 MG Tab  Commonly known as: BYSTOLIC     REPATHA PUSHTRONEX 420 mg/3.5 mL Injt  Generic drug: evolocumab     traZODone 50 MG tablet  Commonly known as: DESYREL     VICTOZA 3-FREDDY 0.6 mg/0.1 mL (18 mg/3 mL) Pnij pen  Generic drug: liraglutide 0.6 mg/0.1 mL (18 mg/3 mL) subq PNIJ               Where to Get Your Medications        These medications were sent to [facility][id number] - [facility] [address] [place] [address] [facility] [address]  [address]  [facility] [address]      Phone: [phone]   metFORMIN 500 MG tablet  miconazole NITRATE 2 % 2 % top powder  zinc oxide-cod liver oil 40 % Pste  paste          Explained in detail to the patient about the discharge plan, medications, and follow-up visits. Pt understands and agrees with the treatment plan  Discharge Disposition: Home or Self Care   Discharged Condition: stable  Diet-        Medications Per DC med rec  Activities as tolerated   Follow-up Information       Magda Hernandez MD Follow up in 1 week(s).    Specialty: Family Medicine  Contact information:  [address] [place] [id number]  [phone]                           For further questions contact hospitalist office    Discharge time 33 minutes    For worsening symptoms, chest pain, shortness of breath, increased abdominal pain, high grade fever, stroke or stroke like symptoms, immediately go to the nearest Emergency Room or call 911 as soon as possible.      Delma English M.D, on 3/11/23 at 10:19 AM.

## 2023-03-12 LAB
ABO + RH BLD: NORMAL
BLD PROD TYP BPU: NORMAL
BLOOD UNIT EXPIRATION DATE: NORMAL
BLOOD UNIT TYPE CODE: 6200
CROSSMATCH INTERPRETATION: NORMAL
DISPENSE STATUS: NORMAL
UNIT NUMBER: NORMAL

## 2023-03-13 ENCOUNTER — PATIENT OUTREACH (OUTPATIENT)
Dept: ADMINISTRATIVE | Facility: CLINIC | Age: 73
End: 2023-03-13
Payer: MEDICARE

## 2023-03-13 LAB
BACTERIA BLD CULT: NORMAL
BACTERIA BLD CULT: NORMAL

## 2023-03-13 NOTE — PROGRESS NOTES
C3 nurse attempted to contact John Mancia Jr.  for a TCC post hospital discharge follow up call. No answer. Left voicemail with callback information. The patient does not have a scheduled HOSFU appointment noted.

## 2023-03-14 NOTE — PROGRESS NOTES
C3 nurse spoke with John Mancia Jr.  for a TCC post hospital discharge follow up call. The patient does not have a scheduled HOSFU appointment with Magda Hernandez MD  within 5-7 days post hospital discharge date 03/11/2023. Stated he will call PCP office to schedule followup appointment.    Patient taking;  Finasteride 5 mg daily  Furosemide 20 mg daily  Losartan 25 mg daily

## 2023-04-05 ENCOUNTER — HOSPITAL ENCOUNTER (EMERGENCY)
Facility: HOSPITAL | Age: 73
Discharge: HOME | End: 2023-04-05
Attending: FAMILY MEDICINE
Payer: MEDICARE

## 2023-04-05 VITALS
HEART RATE: 77 BPM | DIASTOLIC BLOOD PRESSURE: 76 MMHG | SYSTOLIC BLOOD PRESSURE: 137 MMHG | OXYGEN SATURATION: 96 % | RESPIRATION RATE: 20 BRPM | TEMPERATURE: 98 F
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
## 2023-04-05 PROCEDURE — 99284 EMERGENCY DEPT VISIT MOD MDM: CPT | Mod: 25

## 2023-04-05 PROCEDURE — 25000003 PHARM REV CODE 250: Performed by: FAMILY MEDICINE

## 2023-04-05 RX ORDER — KETOROLAC TROMETHAMINE 10 MG/1
10 TABLET, FILM COATED ORAL
Status: COMPLETED | OUTPATIENT
Start: 2023-04-05 | End: 2023-04-05

## 2023-04-05 RX ORDER — KETOROLAC TROMETHAMINE 10 MG/1
10 TABLET, FILM COATED ORAL EVERY 6 HOURS
Qty: 12 TABLET | Refills: 0 | Status: SHIPPED | OUTPATIENT
Start: 2023-04-05 | End: 2023-04-08

## 2023-04-05 RX ADMIN — KETOROLAC TROMETHAMINE 10 MG: 10 TABLET, FILM COATED ORAL at 12:04

## 2023-04-05 NOTE — ED PROVIDER NOTES
Encounter Date: 4/5/2023       History     Chief Complaint   Patient presents with    Hand Injury     Fell yesterday- rt hand contusion /swelling- hit head- denies loc - on ASA therapy      Hand pain head injury   Patient had a fall yesterday with right hand contusion did hit his head is not on blood thinners other than 81 mg aspirin daily patient has no nausea no vomiting no diarrhea no fever chills or night sweats no other complaints no chronic conditions contributing to today's episode.      Review of patient's allergies indicates:   Allergen Reactions    Latex Rash     Past Medical History:   Diagnosis Date    Arthritis     Bipolar disorder, unspecified     CHF (congestive heart failure)     Coronary artery disease     Diabetes mellitus     GERD (gastroesophageal reflux disease)     High cholesterol     Hx of eye surgery     Hypertension     ST elevation (STEMI) myocardial infarction of unspecified site     Stroke      Past Surgical History:   Procedure Laterality Date    CORONARY ARTERY BYPASS GRAFT      KNEE SURGERY       No family history on file.  Social History     Tobacco Use    Smoking status: Former    Smokeless tobacco: Never     Review of Systems   Constitutional:  Negative for fever.   HENT:  Negative for sore throat.    Respiratory:  Negative for shortness of breath.    Cardiovascular:  Negative for chest pain.   Gastrointestinal:  Negative for nausea.   Genitourinary:  Negative for dysuria.   Musculoskeletal:  Positive for arthralgias and myalgias. Negative for back pain.   Skin:  Negative for rash.   Neurological:  Negative for weakness.   Hematological:  Does not bruise/bleed easily.   All other systems reviewed and are negative.    Physical Exam     Initial Vitals [04/05/23 1152]   BP Pulse Resp Temp SpO2   137/76 77 20 98.1 °F (36.7 °C) 96 %      MAP       --         Physical Exam    Nursing note and vitals reviewed.  Constitutional: He appears well-developed and well-nourished.  He is not diaphoretic. No distress.   HENT:   Head: Normocephalic and atraumatic.   Right Ear: External ear normal.   Left Ear: External ear normal.   Nose: Nose normal.   Mouth/Throat: Oropharynx is clear and moist. No oropharyngeal exudate.   Eyes: Conjunctivae and EOM are normal. Pupils are equal, round, and reactive to light. Right eye exhibits no discharge. Left eye exhibits no discharge.   Neck: Neck supple. No thyromegaly present. No tracheal deviation present. No JVD present.   Normal range of motion.  Cardiovascular:  Normal rate, regular rhythm, normal heart sounds and intact distal pulses.     Exam reveals no gallop and no friction rub.       No murmur heard.  Pulmonary/Chest: Breath sounds normal. No stridor. No respiratory distress. He has no wheezes. He has no rhonchi. He has no rales. He exhibits no tenderness.   Abdominal: Abdomen is soft. Bowel sounds are normal. He exhibits no distension. There is no abdominal tenderness. There is no rebound and no guarding.   Musculoskeletal:         General: Tenderness present. No edema. Normal range of motion.      Cervical back: Normal range of motion and neck supple.     Lymphadenopathy:     He has no cervical adenopathy.   Neurological: He is alert and oriented to person, place, and time. He has normal strength and normal reflexes. No cranial nerve deficit or sensory deficit. GCS score is 15. GCS eye subscore is 4. GCS verbal subscore is 5. GCS motor subscore is 6.   Skin: Skin is warm and dry. No rash and no abscess noted. No erythema.   Psychiatric: He has a normal mood and affect. His behavior is normal. Judgment and thought content normal.       ED Course   Procedures  Labs Reviewed - No data to display       Imaging Results              CT Head Without Contrast (Final result)  Result time 04/05/23 12:24:45      Final result by Melissa Simpson MD (04/05/23 12:24:45)                   Impression:      1. No acute intracranial abnormality.  2. Chronic  microvascular ischemic changes.      Electronically signed by: Melissa Simpson  Date:    04/05/2023  Time:    12:24               Narrative:    EXAMINATION:  CT HEAD WITHOUT CONTRAST    CLINICAL HISTORY:  Head trauma, minor (Age >= 65y);    TECHNIQUE:  Axial scans were obtained from skull base to the vertex.    Coronal and sagittal reconstructions obtained from the axial data.    Automatic exposure control was utilized to limit radiation dose.    Contrast: None    Radiation Dose:    Total DLP: 1243 mGy*cm    COMPARISON:  None    FINDINGS:  There is no acute intracranial hemorrhage or edema. The gray-white matter differentiation is preserved.  Scattered hypodensities in the subcortical and periventricular white matter and right basal ganglia likely represent chronic microvascular ischemic changes.    There is no mass effect or midline shift.  There is diffuse parenchymal volume loss.  The basal cisterns are patent. There is no abnormal extra-axial fluid collection.  Carotid and vertebral artery calcifications are noted.    The calvarium and skull base are intact. The visualized paranasal sinuses and the mastoid air cells are clear.                                       X-Ray Hand 3 View Right (Final result)  Result time 04/05/23 12:25:51      Final result by Melissa Simpson MD (04/05/23 12:25:51)                   Impression:      No acute bony abnormality.      Electronically signed by: Melissa Simpson  Date:    04/05/2023  Time:    12:25               Narrative:    EXAMINATION:  XR HAND COMPLETE 3 VIEW RIGHT    CLINICAL HISTORY:  pain;    COMPARISON:  None.    FINDINGS:  There is no acute fracture or malalignment identified.  The soft tissues are unremarkable.                                       Medications - No data to display  Medical Decision Making:   Differential Diagnosis:     Fall head injury hand injury contusion fracture  ED Management:  The patient is resting comfortably and in no acute distress.   "He states that his symptoms have improved after treatment in Emergency Department. I personally discussed his test results and treatment plan.  Gave strict ED precautions.  Specific conditions for return to the emergency department and importance of follow up with his primary care provided or the physician listed on the discharge instructions.  Patient voices understanding and agrees to the plan discussed. All patients' questions have been answered at this time.   He has remained hemodynamically stable throughout entire stay in ED and is stable for discharge home.                                   Clinical Impression:    ***Please document a Clinical Impression and click the "Refresh" button to refresh your note and automatically pull in before signing.***         "

## 2023-04-05 NOTE — ED NOTES
Pt fell yesterday- denies loc- rt hand swelling w bruising noted- +rom of hand/fingers/cap refill <2secs radial pulse +2x2/equal.  Also hit near lateral side of rt eye- small abrasion noted- denies any nv or visula issues.

## 2023-04-05 NOTE — ED PROVIDER NOTES
Encounter Date: 4/5/2023       History     Chief Complaint   Patient presents with    Hand Injury     Fell yesterday- rt hand contusion /swelling- hit head- denies loc - on ASA therapy     HPI  Review of patient's allergies indicates:   Allergen Reactions    Latex Rash     Past Medical History:   Diagnosis Date    Arthritis     Bipolar disorder, unspecified     CHF (congestive heart failure)     Coronary artery disease     Diabetes mellitus     GERD (gastroesophageal reflux disease)     High cholesterol     Hx of eye surgery     Hypertension     ST elevation (STEMI) myocardial infarction of unspecified site     Stroke      Past Surgical History:   Procedure Laterality Date    CORONARY ARTERY BYPASS GRAFT      KNEE SURGERY       No family history on file.  Social History     Tobacco Use    Smoking status: Former    Smokeless tobacco: Never     Review of Systems    Physical Exam     Initial Vitals [04/05/23 1152]   BP Pulse Resp Temp SpO2   137/76 77 20 98.1 °F (36.7 °C) 96 %      MAP       --         Physical Exam    ED Course   Procedures  Labs Reviewed - No data to display       Imaging Results              CT Head Without Contrast (Final result)  Result time 04/05/23 12:24:45      Final result by Melissa Simpson MD (04/05/23 12:24:45)                   Impression:      1. No acute intracranial abnormality.  2. Chronic microvascular ischemic changes.      Electronically signed by: Melissa Simpson  Date:    04/05/2023  Time:    12:24               Narrative:    EXAMINATION:  CT HEAD WITHOUT CONTRAST    CLINICAL HISTORY:  Head trauma, minor (Age >= 65y);    TECHNIQUE:  Axial scans were obtained from skull base to the vertex.    Coronal and sagittal reconstructions obtained from the axial data.    Automatic exposure control was utilized to limit radiation dose.    Contrast: None    Radiation Dose:    Total DLP: 1243 mGy*cm    COMPARISON:  None    FINDINGS:  There is no acute intracranial hemorrhage or edema. The  gray-white matter differentiation is preserved.  Scattered hypodensities in the subcortical and periventricular white matter and right basal ganglia likely represent chronic microvascular ischemic changes.    There is no mass effect or midline shift.  There is diffuse parenchymal volume loss.  The basal cisterns are patent. There is no abnormal extra-axial fluid collection.  Carotid and vertebral artery calcifications are noted.    The calvarium and skull base are intact. The visualized paranasal sinuses and the mastoid air cells are clear.                                       X-Ray Hand 3 View Right (Final result)  Result time 04/05/23 12:25:51      Final result by Melissa Simpson MD (04/05/23 12:25:51)                   Impression:      No acute bony abnormality.      Electronically signed by: Melissa Simpson  Date:    04/05/2023  Time:    12:25               Narrative:    EXAMINATION:  XR HAND COMPLETE 3 VIEW RIGHT    CLINICAL HISTORY:  pain;    COMPARISON:  None.    FINDINGS:  There is no acute fracture or malalignment identified.  The soft tissues are unremarkable.                                       Medications - No data to display                           Clinical Impression:   Final diagnoses:  [W19.XXXA] Fall, initial encounter (Primary)        ED Disposition Condition    Discharge Stable          ED Prescriptions    None       Follow-up Information    None          Yunier Avila MD  04/05/23 8447

## 2023-04-11 LAB — FUNGUS BLD CULT: NORMAL

## 2023-06-09 ENCOUNTER — LAB VISIT (OUTPATIENT)
Dept: LAB | Facility: HOSPITAL | Age: 73
End: 2023-06-09
Payer: MEDICARE

## 2023-06-09 DIAGNOSIS — E78.49 FAMILIAL COMBINED HYPERLIPIDEMIA: ICD-10-CM

## 2023-06-09 DIAGNOSIS — E55.9 AVITAMINOSIS D: ICD-10-CM

## 2023-06-09 DIAGNOSIS — E11.00 TYPE II DIABETES MELLITUS WITH HYPEROSMOLARITY, UNCONTROLLED: Primary | ICD-10-CM

## 2023-06-09 LAB
ALBUMIN SERPL-MCNC: 3.4 G/DL (ref 3.4–4.8)
ALBUMIN/GLOB SERPL: 0.9 RATIO (ref 1.1–2)
ALP SERPL-CCNC: 68 UNIT/L (ref 40–150)
ALT SERPL-CCNC: 28 UNIT/L (ref 0–55)
AST SERPL-CCNC: 23 UNIT/L (ref 5–34)
BILIRUBIN DIRECT+TOT PNL SERPL-MCNC: 0.8 MG/DL
BUN SERPL-MCNC: 11.3 MG/DL (ref 8.4–25.7)
CALCIUM SERPL-MCNC: 9.2 MG/DL (ref 8.8–10)
CHLORIDE SERPL-SCNC: 102 MMOL/L (ref 98–107)
CHOLEST SERPL-MCNC: 234 MG/DL
CHOLEST/HDLC SERPL: 7 {RATIO} (ref 0–5)
CO2 SERPL-SCNC: 25 MMOL/L (ref 23–31)
CREAT SERPL-MCNC: 0.78 MG/DL (ref 0.73–1.18)
DEPRECATED CALCIDIOL+CALCIFEROL SERPL-MC: 21.4 NG/ML (ref 30–80)
EST. AVERAGE GLUCOSE BLD GHB EST-MCNC: 286.2 MG/DL
GFR SERPLBLD CREATININE-BSD FMLA CKD-EPI: >60 MLS/MIN/1.73/M2
GLOBULIN SER-MCNC: 3.8 GM/DL (ref 2.4–3.5)
GLUCOSE SERPL-MCNC: 344 MG/DL (ref 82–115)
HBA1C MFR BLD: 11.6 %
HDLC SERPL-MCNC: 35 MG/DL (ref 35–60)
LDLC SERPL CALC-MCNC: 88 MG/DL (ref 50–140)
POTASSIUM SERPL-SCNC: 4.2 MMOL/L (ref 3.5–5.1)
PROT SERPL-MCNC: 7.2 GM/DL (ref 5.8–7.6)
SODIUM SERPL-SCNC: 138 MMOL/L (ref 136–145)
TRIGL SERPL-MCNC: 553 MG/DL (ref 34–140)
VLDLC SERPL CALC-MCNC: 111 MG/DL

## 2023-06-09 PROCEDURE — 80053 COMPREHEN METABOLIC PANEL: CPT

## 2023-06-09 PROCEDURE — 36415 COLL VENOUS BLD VENIPUNCTURE: CPT

## 2023-06-09 PROCEDURE — 82306 VITAMIN D 25 HYDROXY: CPT

## 2023-06-09 PROCEDURE — 83036 HEMOGLOBIN GLYCOSYLATED A1C: CPT

## 2023-06-09 PROCEDURE — 80061 LIPID PANEL: CPT

## 2023-07-12 ENCOUNTER — HOSPITAL ENCOUNTER (EMERGENCY)
Facility: HOSPITAL | Age: 73
Discharge: HOME | End: 2023-07-12
Attending: FAMILY MEDICINE
Payer: MEDICARE

## 2023-07-12 VITALS
RESPIRATION RATE: 16 BRPM | HEIGHT: 69 IN | HEART RATE: 47 BPM | TEMPERATURE: 98 F | WEIGHT: 208 LBS | DIASTOLIC BLOOD PRESSURE: 59 MMHG | BODY MASS INDEX: 30.81 KG/M2 | SYSTOLIC BLOOD PRESSURE: 139 MMHG | OXYGEN SATURATION: 96 %

## 2023-07-12 DIAGNOSIS — R73.9 HYPERGLYCEMIA: ICD-10-CM

## 2023-07-12 LAB
ALBUMIN SERPL-MCNC: 3.3 G/DL (ref 3.4–4.8)
ALBUMIN/GLOB SERPL: 0.9 RATIO (ref 1.1–2)
ALP SERPL-CCNC: 81 UNIT/L (ref 40–150)
ALT SERPL-CCNC: 21 UNIT/L (ref 0–55)
ANION GAP SERPL CALC-SCNC: 6 MEQ/L
APPEARANCE UR: CLEAR
AST SERPL-CCNC: 19 UNIT/L (ref 5–34)
B-OH-BUTYR SERPL-MCNC: 0.9 MMOL/L
BACTERIA #/AREA URNS AUTO: NORMAL /HPF
BASOPHILS # BLD AUTO: 0.02 X10(3)/MCL
BASOPHILS NFR BLD AUTO: 0.3 %
BILIRUB UR QL STRIP.AUTO: NEGATIVE MG/DL
BILIRUBIN DIRECT+TOT PNL SERPL-MCNC: 0.8 MG/DL
BUN SERPL-MCNC: 10.9 MG/DL (ref 8.4–25.7)
BUN SERPL-MCNC: 13.2 MG/DL (ref 8.4–25.7)
CALCIUM SERPL-MCNC: 9 MG/DL (ref 8.8–10)
CALCIUM SERPL-MCNC: 9.9 MG/DL (ref 8.8–10)
CHLORIDE SERPL-SCNC: 103 MMOL/L (ref 98–107)
CHLORIDE SERPL-SCNC: 97 MMOL/L (ref 98–107)
CO2 SERPL-SCNC: 26 MMOL/L (ref 23–31)
CO2 SERPL-SCNC: 28 MMOL/L (ref 23–31)
COLOR UR: YELLOW
CREAT SERPL-MCNC: 0.72 MG/DL (ref 0.73–1.18)
CREAT SERPL-MCNC: 0.92 MG/DL (ref 0.73–1.18)
CREAT/UREA NIT SERPL: 15
EOSINOPHIL # BLD AUTO: 0.16 X10(3)/MCL (ref 0–0.9)
EOSINOPHIL NFR BLD AUTO: 2.5 %
ERYTHROCYTE [DISTWIDTH] IN BLOOD BY AUTOMATED COUNT: 12.3 % (ref 11.5–17)
FLUAV AG UPPER RESP QL IA.RAPID: NOT DETECTED
FLUBV AG UPPER RESP QL IA.RAPID: NOT DETECTED
GFR SERPLBLD CREATININE-BSD FMLA CKD-EPI: >60 MLS/MIN/1.73/M2
GFR SERPLBLD CREATININE-BSD FMLA CKD-EPI: >60 MLS/MIN/1.73/M2
GLOBULIN SER-MCNC: 3.8 GM/DL (ref 2.4–3.5)
GLUCOSE SERPL-MCNC: 321 MG/DL (ref 82–115)
GLUCOSE SERPL-MCNC: 648 MG/DL (ref 82–115)
GLUCOSE UR QL STRIP.AUTO: >=1000 MG/DL
HCO3 UR-SCNC: 29.5 MMOL/L (ref 24–28)
HCT VFR BLD AUTO: 40.9 % (ref 42–52)
HGB BLD-MCNC: 14 G/DL (ref 14–18)
IMM GRANULOCYTES # BLD AUTO: 0.02 X10(3)/MCL (ref 0–0.04)
IMM GRANULOCYTES NFR BLD AUTO: 0.3 %
KETONES UR QL STRIP.AUTO: 15 MG/DL
LEUKOCYTE ESTERASE UR QL STRIP.AUTO: NEGATIVE UNIT/L
LYMPHOCYTES # BLD AUTO: 1.9 X10(3)/MCL (ref 0.6–4.6)
LYMPHOCYTES NFR BLD AUTO: 29.9 %
MCH RBC QN AUTO: 29.9 PG (ref 27–31)
MCHC RBC AUTO-ENTMCNC: 34.2 G/DL (ref 33–36)
MCV RBC AUTO: 87.4 FL (ref 80–94)
MONOCYTES # BLD AUTO: 0.61 X10(3)/MCL (ref 0.1–1.3)
MONOCYTES NFR BLD AUTO: 9.6 %
NEUTROPHILS # BLD AUTO: 3.65 X10(3)/MCL (ref 2.1–9.2)
NEUTROPHILS NFR BLD AUTO: 57.4 %
NITRITE UR QL STRIP.AUTO: NEGATIVE
PCO2 BLDA: 34.8 MMHG (ref 35–45)
PH SMN: 7.54 [PH] (ref 7.35–7.45)
PH UR STRIP.AUTO: 5.5 [PH]
PLATELET # BLD AUTO: 154 X10(3)/MCL (ref 130–400)
PMV BLD AUTO: 9.7 FL (ref 7.4–10.4)
PO2 BLDA: 113 MMHG (ref 40–60)
POC BE: 7 MMOL/L
POC SATURATED O2: 99 % (ref 95–100)
POC TCO2: 31 MMOL/L (ref 24–29)
POCT GLUCOSE: 311 MG/DL (ref 70–110)
POCT GLUCOSE: 358 MG/DL (ref 70–110)
POCT GLUCOSE: >500 MG/DL (ref 70–110)
POCT GLUCOSE: >500 MG/DL (ref 70–110)
POTASSIUM SERPL-SCNC: 4 MMOL/L (ref 3.5–5.1)
POTASSIUM SERPL-SCNC: 4.2 MMOL/L (ref 3.5–5.1)
PROT SERPL-MCNC: 7.1 GM/DL (ref 5.8–7.6)
PROT UR QL STRIP.AUTO: NEGATIVE MG/DL
RBC # BLD AUTO: 4.68 X10(6)/MCL (ref 4.7–6.1)
RBC #/AREA URNS AUTO: NORMAL /HPF
RBC UR QL AUTO: ABNORMAL UNIT/L
SAMPLE: ABNORMAL
SARS-COV-2 RNA RESP QL NAA+PROBE: NOT DETECTED
SODIUM SERPL-SCNC: 133 MMOL/L (ref 136–145)
SODIUM SERPL-SCNC: 137 MMOL/L (ref 136–145)
SP GR UR STRIP.AUTO: 1.02
SQUAMOUS #/AREA URNS AUTO: NORMAL /HPF
UROBILINOGEN UR STRIP-ACNC: 0.2 MG/DL
WBC # SPEC AUTO: 6.36 X10(3)/MCL (ref 4.5–11.5)
WBC #/AREA URNS AUTO: NORMAL /HPF

## 2023-07-12 PROCEDURE — 80053 COMPREHEN METABOLIC PANEL: CPT | Performed by: FAMILY MEDICINE

## 2023-07-12 PROCEDURE — 96376 TX/PRO/DX INJ SAME DRUG ADON: CPT

## 2023-07-12 PROCEDURE — 96361 HYDRATE IV INFUSION ADD-ON: CPT

## 2023-07-12 PROCEDURE — 81001 URINALYSIS AUTO W/SCOPE: CPT | Performed by: FAMILY MEDICINE

## 2023-07-12 PROCEDURE — 99900035 HC TECH TIME PER 15 MIN (STAT)

## 2023-07-12 PROCEDURE — 93005 ELECTROCARDIOGRAM TRACING: CPT

## 2023-07-12 PROCEDURE — 93010 EKG 12-LEAD: ICD-10-PCS | Mod: ,,, | Performed by: INTERNAL MEDICINE

## 2023-07-12 PROCEDURE — 25000003 PHARM REV CODE 250: Performed by: FAMILY MEDICINE

## 2023-07-12 PROCEDURE — 63600175 PHARM REV CODE 636 W HCPCS: Performed by: FAMILY MEDICINE

## 2023-07-12 PROCEDURE — 82962 GLUCOSE BLOOD TEST: CPT | Mod: 91

## 2023-07-12 PROCEDURE — 0240U COVID/FLU A&B PCR: CPT | Performed by: FAMILY MEDICINE

## 2023-07-12 PROCEDURE — 82010 KETONE BODYS QUAN: CPT | Performed by: FAMILY MEDICINE

## 2023-07-12 PROCEDURE — 85025 COMPLETE CBC W/AUTO DIFF WBC: CPT | Performed by: FAMILY MEDICINE

## 2023-07-12 PROCEDURE — 93010 ELECTROCARDIOGRAM REPORT: CPT | Mod: ,,, | Performed by: INTERNAL MEDICINE

## 2023-07-12 PROCEDURE — 96374 THER/PROPH/DIAG INJ IV PUSH: CPT

## 2023-07-12 PROCEDURE — 99285 EMERGENCY DEPT VISIT HI MDM: CPT | Mod: 25

## 2023-07-12 RX ORDER — SODIUM CHLORIDE 9 MG/ML
1000 INJECTION, SOLUTION INTRAVENOUS
Status: COMPLETED | OUTPATIENT
Start: 2023-07-12 | End: 2023-07-12

## 2023-07-12 RX ADMIN — SODIUM CHLORIDE 1000 ML: 9 INJECTION, SOLUTION INTRAVENOUS at 09:07

## 2023-07-12 RX ADMIN — INSULIN HUMAN 5 UNITS: 100 INJECTION, SOLUTION PARENTERAL at 01:07

## 2023-07-12 RX ADMIN — INSULIN HUMAN 15 UNITS: 100 INJECTION, SOLUTION PARENTERAL at 09:07

## 2023-07-12 RX ADMIN — SODIUM CHLORIDE 1000 ML: 9 INJECTION, SOLUTION INTRAVENOUS at 11:07

## 2023-07-12 RX ADMIN — INSULIN HUMAN 20 UNITS: 100 INJECTION, SOLUTION PARENTERAL at 11:07

## 2023-07-12 NOTE — ED NOTES
Reporrt received from  shireen kennedy- assuming care of pt now. Treating glucose called back just now- another liter ivf via pump at 200cc/hr - ns and  20 uniits regular insulin ivp given.   Lt pivsite free of edema/redness- gcs 15 . Resting comfortably.

## 2023-07-12 NOTE — ED NOTES
Pt sitting on side of bed, eating meal tray; informed of d/c home and states he needs to figure out how he will get there

## 2023-07-12 NOTE — ED PROVIDER NOTES
Encounter Date: 7/12/2023       History     Chief Complaint   Patient presents with    Hyperglycemia     Pt called EMS because he was having problems at home with lady; denies any medical complaints; however cbg 560 with EMS; pt states he did not take his insulin yesterday or today;      Elevated blood glucose 72-year-old male patient comes in with elevated blood glucose greater than 500 states that he has been fighting with his significant other at home because of this is not been taking his insulin patient states that he is missed the last 2 days also did not check the last 2 days patient has chronic history of diabetes that is contributing to today's episode patient is source of his history      Review of patient's allergies indicates:   Allergen Reactions    Latex Rash     Past Medical History:   Diagnosis Date    Arthritis     Bipolar disorder, unspecified     CHF (congestive heart failure)     Coronary artery disease     Diabetes mellitus     GERD (gastroesophageal reflux disease)     High cholesterol     Hx of eye surgery     Hypertension     ST elevation (STEMI) myocardial infarction of unspecified site     Stroke      Past Surgical History:   Procedure Laterality Date    CORONARY ARTERY BYPASS GRAFT      KNEE SURGERY       No family history on file.  Social History     Tobacco Use    Smoking status: Former    Smokeless tobacco: Never     Review of Systems   Constitutional:  Negative for fever.   HENT:  Negative for sore throat.    Respiratory:  Negative for shortness of breath.    Cardiovascular:  Negative for chest pain.   Gastrointestinal:  Negative for nausea.   Genitourinary:  Negative for dysuria.   Musculoskeletal:  Negative for back pain.   Skin:  Negative for rash.   Neurological:  Negative for weakness.   Hematological:  Does not bruise/bleed easily.   All other systems reviewed and are negative.    Physical Exam     Initial Vitals [07/12/23 0915]   BP Pulse Resp Temp SpO2   (!) 152/90 (!) 57 18  97.9 °F (36.6 °C) 96 %      MAP       --         Physical Exam    Nursing note and vitals reviewed.  Constitutional: He appears well-developed and well-nourished. He is not diaphoretic. No distress.   HENT:   Head: Normocephalic and atraumatic.   Right Ear: External ear normal.   Left Ear: External ear normal.   Nose: Nose normal.   Mouth/Throat: Oropharynx is clear and moist. No oropharyngeal exudate.   Eyes: Conjunctivae and EOM are normal. Pupils are equal, round, and reactive to light. Right eye exhibits no discharge. Left eye exhibits no discharge.   Neck: Neck supple. No thyromegaly present. No tracheal deviation present. No JVD present.   Normal range of motion.  Cardiovascular:  Normal rate, regular rhythm, normal heart sounds and intact distal pulses.     Exam reveals no gallop and no friction rub.       No murmur heard.  Pulmonary/Chest: Breath sounds normal. No stridor. No respiratory distress. He has no wheezes. He has no rhonchi. He has no rales. He exhibits no tenderness.   Abdominal: Abdomen is soft. Bowel sounds are normal. He exhibits no distension. There is no abdominal tenderness. There is no rebound and no guarding.   Musculoskeletal:         General: No tenderness or edema. Normal range of motion.      Cervical back: Normal range of motion and neck supple.     Lymphadenopathy:     He has no cervical adenopathy.   Neurological: He is alert and oriented to person, place, and time. He has normal strength and normal reflexes. No cranial nerve deficit or sensory deficit. GCS score is 15. GCS eye subscore is 4. GCS verbal subscore is 5. GCS motor subscore is 6.   Skin: Skin is warm and dry. No rash and no abscess noted. No erythema.   Psychiatric: He has a normal mood and affect. His behavior is normal. Judgment and thought content normal.       ED Course   Procedures  Labs Reviewed   COMPREHENSIVE METABOLIC PANEL - Abnormal; Notable for the following components:       Result Value    Sodium Level 133  (*)     Chloride 97 (*)     Glucose Level 648 (*)     Albumin Level 3.3 (*)     Globulin 3.8 (*)     Albumin/Globulin Ratio 0.9 (*)     All other components within normal limits   URINALYSIS, REFLEX TO URINE CULTURE - Abnormal; Notable for the following components:    Glucose, UA >=1000 (*)     Ketones, UA 15 (*)     Blood, UA Trace-Intact (*)     All other components within normal limits   CBC WITH DIFFERENTIAL - Abnormal; Notable for the following components:    RBC 4.68 (*)     Hct 40.9 (*)     All other components within normal limits   BASIC METABOLIC PANEL - Abnormal; Notable for the following components:    Glucose Level 321 (*)     Creatinine 0.72 (*)     All other components within normal limits   POCT GLUCOSE - Abnormal; Notable for the following components:    POCT Glucose >500 (*)     All other components within normal limits   ISTAT PROCEDURE - Abnormal; Notable for the following components:    POC PH 7.536 (*)     POC PCO2 34.8 (*)     POC PO2 113 (*)     POC HCO3 29.5 (*)     POC TCO2 31 (*)     All other components within normal limits   POCT GLUCOSE - Abnormal; Notable for the following components:    POCT Glucose >500 (*)     All other components within normal limits   POCT GLUCOSE - Abnormal; Notable for the following components:    POCT Glucose 358 (*)     All other components within normal limits   POCT GLUCOSE - Abnormal; Notable for the following components:    POCT Glucose 311 (*)     All other components within normal limits   COVID/FLU A&B PCR - Normal    Narrative:     The Xpert Xpress SARS-CoV-2/FLU/RSV plus is a rapid, multiplexed real-time PCR test intended for the simultaneous qualitative detection and differentiation of SARS-CoV-2, Influenza A, Influenza B, and respiratory syncytial virus (RSV) viral RNA in either nasopharyngeal swab or nasal swab specimens.         URINALYSIS, MICROSCOPIC - Normal   CBC W/ AUTO DIFFERENTIAL    Narrative:     The following orders were created for  panel order CBC auto differential.  Procedure                               Abnormality         Status                     ---------                               -----------         ------                     CBC with Differential[[id number]]        Abnormal            Final result                 Please view results for these tests on the individual orders.   BETA - HYDROXYBUTYRATE, SERUM   POCT GLUCOSE, HAND-HELD DEVICE          Imaging Results              X-Ray Chest AP Portable (Final result)  Result time 07/12/23 09:44:30      Final result by Mamadou Hollingsworth MD (07/12/23 09:44:30)                   Impression:      No acute chest disease is identified.      Electronically signed by: Mamadou Hollingsworth  Date:    07/12/2023  Time:    09:44               Narrative:    EXAMINATION:  XR CHEST AP PORTABLE    CLINICAL HISTORY:  hyperglycemia;, .    COMPARISON:  March 8, 2023    FINDINGS:  No alveolar consolidation, effusion, or pneumothorax is seen.   The thoracic aorta is normal  cardiac silhouette, central pulmonary vessels and mediastinum are normal in size and are grossly unremarkable.   visualized osseous structures are grossly unremarkable..    Linear densities identified at the left base represent either subsegmental atelectatic changes and or fibrotic streaks                                       Medications   sodium chloride 0.9% bolus 1,000 mL 1,000 mL (0 mLs Intravenous Stopped 7/12/23 1059)   insulin regular injection 15 Units 0.15 mL (15 Units Intravenous Given 7/12/23 0956)   insulin regular injection 20 Units 0.2 mL (20 Units Intravenous Given 7/12/23 1114)   0.9%  NaCl infusion (1,000 mLs Intravenous New Bag 7/12/23 1117)   insulin regular injection 5 Units 0.05 mL (5 Units Intravenous Given 7/12/23 1356)     Medical Decision Making:   Differential Diagnosis:   DKA hyperglycemia elevated glucose                        Clinical Impression:   Final diagnoses:  [R73.9] Hyperglycemia        ED  Disposition Condition    Discharge Stable          ED Prescriptions    None       Follow-up Information       Follow up With Specialties Details Why Contact Info    Magda Hernandez MD Family Medicine   [address] [place] [id number]  [phone]               Yunier Avila MD  07/12/23 0855

## 2023-07-12 NOTE — ED NOTES
"Called Carlyn, pt's girlfriend, when she answered the phone she said "I told him he can't come back here, he ain't coming"; nurse manager, Carlota, made aware of situation, states if pt does not get a ride then we can call a cab for him   "

## 2023-08-24 ENCOUNTER — HOSPITAL ENCOUNTER (INPATIENT)
Facility: HOSPITAL | Age: 73
LOS: 4 days | Discharge: HOME HEALTH | DRG: 638 | End: 2023-08-28
Attending: EMERGENCY MEDICINE | Admitting: STUDENT IN AN ORGANIZED HEALTH CARE EDUCATION/TRAINING PROGRAM
Payer: MEDICARE

## 2023-08-24 DIAGNOSIS — F32.A DEPRESSION, UNSPECIFIED DEPRESSION TYPE: ICD-10-CM
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
## 2023-08-24 DIAGNOSIS — R73.9 HYPERGLYCEMIA: Primary | ICD-10-CM

## 2023-08-24 DIAGNOSIS — R07.9 CHEST PAIN: ICD-10-CM

## 2023-08-24 LAB
ALBUMIN SERPL-MCNC: 3.5 G/DL (ref 3.4–4.8)
ALBUMIN/GLOB SERPL: 0.9 RATIO (ref 1.1–2)
ALP SERPL-CCNC: 90 UNIT/L (ref 40–150)
ALT SERPL-CCNC: 17 UNIT/L (ref 0–55)
AMPHET UR QL SCN: NEGATIVE
APPEARANCE UR: CLEAR
AST SERPL-CCNC: 15 UNIT/L (ref 5–34)
BACTERIA #/AREA URNS AUTO: NORMAL /HPF
BARBITURATE SCN PRESENT UR: NEGATIVE
BASOPHILS # BLD AUTO: 0.03 X10(3)/MCL
BASOPHILS NFR BLD AUTO: 0.4 %
BENZODIAZ UR QL SCN: NEGATIVE
BILIRUB SERPL-MCNC: 1.1 MG/DL
BILIRUB UR QL STRIP.AUTO: NEGATIVE
BNP BLD-MCNC: 44.4 PG/ML
BUN SERPL-MCNC: 15.8 MG/DL (ref 8.4–25.7)
CALCIUM SERPL-MCNC: 9.3 MG/DL (ref 8.8–10)
CANNABINOIDS UR QL SCN: NEGATIVE
CHLORIDE SERPL-SCNC: 90 MMOL/L (ref 98–107)
CO2 SERPL-SCNC: 25 MMOL/L (ref 23–31)
COCAINE UR QL SCN: NEGATIVE
COLOR UR: YELLOW
CREAT SERPL-MCNC: 1.23 MG/DL (ref 0.73–1.18)
EOSINOPHIL # BLD AUTO: 0.01 X10(3)/MCL (ref 0–0.9)
EOSINOPHIL NFR BLD AUTO: 0.1 %
ERYTHROCYTE [DISTWIDTH] IN BLOOD BY AUTOMATED COUNT: 12.3 % (ref 11.5–17)
ETHANOL SERPL-MCNC: <10 MG/DL
FLUAV AG UPPER RESP QL IA.RAPID: NOT DETECTED
FLUBV AG UPPER RESP QL IA.RAPID: NOT DETECTED
GFR SERPLBLD CREATININE-BSD FMLA CKD-EPI: >60 MLS/MIN/1.73/M2
GLOBULIN SER-MCNC: 3.9 GM/DL (ref 2.4–3.5)
GLUCOSE SERPL-MCNC: 680 MG/DL (ref 82–115)
GLUCOSE UR QL STRIP.AUTO: >=1000
HCT VFR BLD AUTO: 43.1 % (ref 42–52)
HGB BLD-MCNC: 15 G/DL (ref 14–18)
IMM GRANULOCYTES # BLD AUTO: 0.01 X10(3)/MCL (ref 0–0.04)
IMM GRANULOCYTES NFR BLD AUTO: 0.1 %
KETONES UR QL STRIP.AUTO: 40
LEUKOCYTE ESTERASE UR QL STRIP.AUTO: NEGATIVE
LYMPHOCYTES # BLD AUTO: 2.41 X10(3)/MCL (ref 0.6–4.6)
LYMPHOCYTES NFR BLD AUTO: 35.5 %
MCH RBC QN AUTO: 29.8 PG (ref 27–31)
MCHC RBC AUTO-ENTMCNC: 34.8 G/DL (ref 33–36)
MCV RBC AUTO: 85.5 FL (ref 80–94)
MONOCYTES # BLD AUTO: 0.6 X10(3)/MCL (ref 0.1–1.3)
MONOCYTES NFR BLD AUTO: 8.8 %
NEUTROPHILS # BLD AUTO: 3.72 X10(3)/MCL (ref 2.1–9.2)
NEUTROPHILS NFR BLD AUTO: 55.1 %
NITRITE UR QL STRIP.AUTO: NEGATIVE
OPIATES UR QL SCN: NEGATIVE
PCP UR QL: NEGATIVE
PH UR STRIP.AUTO: 5.5 [PH]
PH UR: 5.5 [PH] (ref 3–11)
PLATELET # BLD AUTO: 138 X10(3)/MCL (ref 130–400)
PMV BLD AUTO: 10.7 FL (ref 7.4–10.4)
POCT GLUCOSE: 339 MG/DL (ref 70–110)
POCT GLUCOSE: 375 MG/DL (ref 70–110)
POCT GLUCOSE: 396 MG/DL (ref 70–110)
POCT GLUCOSE: >500 MG/DL (ref 70–110)
POTASSIUM SERPL-SCNC: 4.2 MMOL/L (ref 3.5–5.1)
PROT SERPL-MCNC: 7.4 GM/DL (ref 5.8–7.6)
PROT UR QL STRIP.AUTO: NEGATIVE
RBC # BLD AUTO: 5.04 X10(6)/MCL (ref 4.7–6.1)
RBC #/AREA URNS AUTO: NORMAL /HPF
RBC UR QL AUTO: NEGATIVE
SARS-COV-2 RNA RESP QL NAA+PROBE: NOT DETECTED
SODIUM SERPL-SCNC: 127 MMOL/L (ref 136–145)
SP GR UR STRIP.AUTO: <=1.005
SPECIFIC GRAVITY, URINE AUTO (.000) (OHS): <1.005 (ref 1–1.03)
SQUAMOUS #/AREA URNS AUTO: NORMAL /HPF
UROBILINOGEN UR STRIP-ACNC: 0.2
WBC # SPEC AUTO: 6.78 X10(3)/MCL (ref 4.5–11.5)
WBC #/AREA URNS AUTO: NORMAL /HPF

## 2023-08-24 PROCEDURE — 80307 DRUG TEST PRSMV CHEM ANLYZR: CPT | Performed by: EMERGENCY MEDICINE

## 2023-08-24 PROCEDURE — 25000003 PHARM REV CODE 250: Performed by: FAMILY MEDICINE

## 2023-08-24 PROCEDURE — 87040 BLOOD CULTURE FOR BACTERIA: CPT | Performed by: EMERGENCY MEDICINE

## 2023-08-24 PROCEDURE — 11000001 HC ACUTE MED/SURG PRIVATE ROOM

## 2023-08-24 PROCEDURE — 96365 THER/PROPH/DIAG IV INF INIT: CPT

## 2023-08-24 PROCEDURE — 82077 ASSAY SPEC XCP UR&BREATH IA: CPT | Performed by: EMERGENCY MEDICINE

## 2023-08-24 PROCEDURE — 96376 TX/PRO/DX INJ SAME DRUG ADON: CPT

## 2023-08-24 PROCEDURE — 82962 GLUCOSE BLOOD TEST: CPT | Mod: 91

## 2023-08-24 PROCEDURE — 25000003 PHARM REV CODE 250: Performed by: EMERGENCY MEDICINE

## 2023-08-24 PROCEDURE — 63600175 PHARM REV CODE 636 W HCPCS: Performed by: EMERGENCY MEDICINE

## 2023-08-24 PROCEDURE — 0240U COVID/FLU A&B PCR: CPT | Performed by: EMERGENCY MEDICINE

## 2023-08-24 PROCEDURE — 80053 COMPREHEN METABOLIC PANEL: CPT | Performed by: EMERGENCY MEDICINE

## 2023-08-24 PROCEDURE — 85025 COMPLETE CBC W/AUTO DIFF WBC: CPT | Performed by: EMERGENCY MEDICINE

## 2023-08-24 PROCEDURE — 93010 ELECTROCARDIOGRAM REPORT: CPT | Mod: ,,, | Performed by: INTERNAL MEDICINE

## 2023-08-24 PROCEDURE — 83880 ASSAY OF NATRIURETIC PEPTIDE: CPT | Performed by: EMERGENCY MEDICINE

## 2023-08-24 PROCEDURE — 93005 ELECTROCARDIOGRAM TRACING: CPT

## 2023-08-24 PROCEDURE — 93010 EKG 12-LEAD: ICD-10-PCS | Mod: ,,, | Performed by: INTERNAL MEDICINE

## 2023-08-24 PROCEDURE — 81001 URINALYSIS AUTO W/SCOPE: CPT | Performed by: EMERGENCY MEDICINE

## 2023-08-24 PROCEDURE — 63600175 PHARM REV CODE 636 W HCPCS: Performed by: FAMILY MEDICINE

## 2023-08-24 PROCEDURE — 99285 EMERGENCY DEPT VISIT HI MDM: CPT | Mod: 25

## 2023-08-24 RX ORDER — SODIUM CHLORIDE 0.9 % (FLUSH) 0.9 %
10 SYRINGE (ML) INJECTION
Status: DISCONTINUED | OUTPATIENT
Start: 2023-08-24 | End: 2023-08-25

## 2023-08-24 RX ORDER — SODIUM CHLORIDE 9 MG/ML
1000 INJECTION, SOLUTION INTRAVENOUS CONTINUOUS
Status: ACTIVE | OUTPATIENT
Start: 2023-08-24 | End: 2023-08-25

## 2023-08-24 RX ORDER — TALC
6 POWDER (GRAM) TOPICAL NIGHTLY PRN
Status: DISCONTINUED | OUTPATIENT
Start: 2023-08-24 | End: 2023-08-25

## 2023-08-24 RX ORDER — IBUPROFEN 200 MG
16 TABLET ORAL
Status: DISCONTINUED | OUTPATIENT
Start: 2023-08-24 | End: 2023-08-25

## 2023-08-24 RX ORDER — ONDANSETRON 2 MG/ML
4 INJECTION INTRAMUSCULAR; INTRAVENOUS EVERY 8 HOURS PRN
Status: DISCONTINUED | OUTPATIENT
Start: 2023-08-24 | End: 2023-08-25

## 2023-08-24 RX ORDER — SODIUM CHLORIDE 9 MG/ML
1000 INJECTION, SOLUTION INTRAVENOUS
Status: COMPLETED | OUTPATIENT
Start: 2023-08-24 | End: 2023-08-24

## 2023-08-24 RX ORDER — ASPIRIN 81 MG/1
162 TABLET ORAL DAILY
Status: DISCONTINUED | OUTPATIENT
Start: 2023-08-25 | End: 2023-08-25

## 2023-08-24 RX ORDER — KETOROLAC TROMETHAMINE 30 MG/ML
15 INJECTION, SOLUTION INTRAMUSCULAR; INTRAVENOUS EVERY 6 HOURS PRN
Status: DISCONTINUED | OUTPATIENT
Start: 2023-08-24 | End: 2023-08-25

## 2023-08-24 RX ORDER — GLUCAGON 1 MG
1 KIT INJECTION
Status: DISCONTINUED | OUTPATIENT
Start: 2023-08-24 | End: 2023-08-25

## 2023-08-24 RX ORDER — IBUPROFEN 200 MG
24 TABLET ORAL
Status: DISCONTINUED | OUTPATIENT
Start: 2023-08-24 | End: 2023-08-25

## 2023-08-24 RX ADMIN — INSULIN HUMAN 15 UNITS: 100 INJECTION, SOLUTION PARENTERAL at 10:08

## 2023-08-24 RX ADMIN — INSULIN HUMAN 10 UNITS: 100 INJECTION, SOLUTION PARENTERAL at 07:08

## 2023-08-24 RX ADMIN — SODIUM CHLORIDE 1000 ML: 9 INJECTION, SOLUTION INTRAVENOUS at 06:08

## 2023-08-24 RX ADMIN — SODIUM CHLORIDE 1000 ML: 9 INJECTION, SOLUTION INTRAVENOUS at 11:08

## 2023-08-24 RX ADMIN — SODIUM CHLORIDE 1000 ML: 9 INJECTION, SOLUTION INTRAVENOUS at 04:08

## 2023-08-24 RX ADMIN — INSULIN HUMAN 0.09 UNITS/KG/HR: 1 INJECTION, SOLUTION INTRAVENOUS at 08:08

## 2023-08-24 RX ADMIN — INSULIN HUMAN 10 UNITS: 100 INJECTION, SOLUTION PARENTERAL at 05:08

## 2023-08-24 NOTE — ED NOTES
Patient arrived from home by edmundian unclean cat feces to feet and cat hair per medics house looks bad girlfriend is at home. Patient reports girlfriend at times helps him . Per previous records girlfriend made numerous calls to  Office for him to seek help . Pt per girlfriend confused multiple fall urinating on self .

## 2023-08-24 NOTE — ED PROVIDER NOTES
Encounter Date: 8/24/2023       History     Chief Complaint   Patient presents with    Fall     Pt arrived by bibi patient slipped and fell having back pain denies loc     This 72-year-old man with bipolar disorder, CHF, CAD, DM, GERD, [place], HLD, HTN, MI, JOEL, OA, TIA, CVA is brought to the emergency room after falling at home.  A quick review of his chart shows that his roommate a Ms. Carlyn Antonio has been contacting his primary care provider regularly for the past 6 weeks stating that he can not take care of himself, he is frequently confused, urinates on himself and falls.  He has numerous cats in his home and on arrival he is covered in cat feces which is caked on his feet and hands.  She describes him as despondent and having given up and needing to go to a nursing home.         Review of patient's allergies indicates:   Allergen Reactions    Latex Rash     Past Medical History:   Diagnosis Date    Arthritis     Bipolar disorder, unspecified     CHF (congestive heart failure)     Coronary artery disease     Diabetes mellitus     GERD (gastroesophageal reflux disease)     High cholesterol     Hx of eye surgery     Hypertension     ST elevation (STEMI) myocardial infarction of unspecified site     Stroke      Past Surgical History:   Procedure Laterality Date    CORONARY ARTERY BYPASS GRAFT      KNEE SURGERY       History reviewed. No pertinent family history.  Social History     Tobacco Use    Smoking status: Former     Current packs/day: 0.00    Smokeless tobacco: Never     Review of Systems   Constitutional:  Negative for appetite change and fever.   HENT:  Negative for sore throat.    Respiratory:  Negative for shortness of breath.    Cardiovascular:  Negative for chest pain.   Gastrointestinal:  Negative for nausea.   Endocrine: Positive for polyuria.   Genitourinary:  Negative for dysuria.   Musculoskeletal:  Negative for back pain.   Skin:  Negative for rash.   Neurological:  Negative for weakness.    Hematological:  Does not bruise/bleed easily.   Psychiatric/Behavioral:  Positive for dysphoric mood.        Physical Exam     Initial Vitals [08/24/23 1550]   BP Pulse Resp Temp SpO2   126/78 81 20 (!) 101 °F (38.3 °C) 96 %      MAP       --         Physical Exam    Constitutional: He appears well-developed and well-nourished.   Patient's lower extremities are covered in cat feces, as well as his hands and nails.    HENT:   Head: Normocephalic and atraumatic.   Mouth/Throat: Mucous membranes are normal.   Eyes: EOM are normal. Pupils are equal, round, and reactive to light.   Neck: Neck supple.   Normal range of motion.  Cardiovascular:  Normal rate, regular rhythm, normal heart sounds and intact distal pulses.           Pulmonary/Chest: Breath sounds normal.   Abdominal: Abdomen is soft. Bowel sounds are normal.   Musculoskeletal:         General: Normal range of motion.      Cervical back: Normal range of motion and neck supple.     Neurological: He is alert and oriented to person, place, and time. He has normal strength.   Skin: Skin is warm and dry. Capillary refill takes less than 2 seconds.   Psychiatric: His behavior is normal. Judgment and thought content normal.         ED Course   Procedures  Labs Reviewed   COMPREHENSIVE METABOLIC PANEL - Abnormal; Notable for the following components:       Result Value    Sodium Level 127 (*)     Chloride 90 (*)     Glucose Level 680 (*)     Creatinine 1.23 (*)     Globulin 3.9 (*)     Albumin/Globulin Ratio 0.9 (*)     All other components within normal limits   URINALYSIS, REFLEX TO URINE CULTURE - Abnormal; Notable for the following components:    Glucose, UA >=1000 (*)     Ketones, UA 40 (*)     All other components within normal limits   CBC WITH DIFFERENTIAL - Abnormal; Notable for the following components:    MPV 10.7 (*)     All other components within normal limits   POCT GLUCOSE - Abnormal; Notable for the following components:    POCT Glucose >500 (*)      All other components within normal limits   POCT GLUCOSE - Abnormal; Notable for the following components:    POCT Glucose 375 (*)     All other components within normal limits   ALCOHOL,MEDICAL (ETHANOL) - Normal   DRUG SCREEN, URINE (BEAKER) - Normal    Narrative:     Cut off concentrations:    Amphetamines - 1000 ng/ml  Barbiturates - 200 ng/ml  Benzodiazepine - 200 ng/ml  Cannabinoids (THC) - 50 ng/ml  Cocaine - 300 ng/ml  Fentanyl - 1.0 ng/ml  MDMA - 500 ng/ml  Opiates - 300 ng/ml   Phencyclidine (PCP) - 25 ng/ml    Specimen submitted for drug analysis and tested for pH and specific gravity in order to evaluate sample integrity. Suspect tampering if specific gravity is <1.003 and/or pH is not within the range of 4.5 - 8.0  False negatives may result form substances such as bleach added to urine.  False positives may result for the presence of a substance with similar chemical structure to the drug or its metabolite.    This test provides only a PRELIMINARY analytical test result. A more specific alternate chemical method must be used in order to obtain a confirmed analytical result. Gas chromatography/mass spectrometry (GC/MS) is the preferred confirmatory method. Other chemical confirmation methods are available. Clinical consideration and professional judgement should be applied to any drug of abuse test result, particularly when preliminary positive results are used.    Positive results will be confirmed only at the physicians request. Unconfirmed screening results are to be used only for medical purposes (treatment).        B-TYPE NATRIURETIC PEPTIDE - Normal   COVID/FLU A&B PCR - Normal    Narrative:     The Xpert Xpress SARS-CoV-2/FLU/RSV plus is a rapid, multiplexed real-time PCR test intended for the simultaneous qualitative detection and differentiation of SARS-CoV-2, Influenza A, Influenza B, and respiratory syncytial virus (RSV) viral RNA in either nasopharyngeal swab or nasal swab specimens.          URINALYSIS, MICROSCOPIC - Normal   BLOOD CULTURE [facility]   BLOOD CULTURE [facility]   CBC W/ AUTO DIFFERENTIAL    Narrative:     The following orders were created for panel order CBC auto differential.  Procedure                               Abnormality         Status                     ---------                               -----------         ------                     CBC with Differential[[id number]]        Abnormal            Final result                 Please view results for these tests on the individual orders.   POCT GLUCOSE MONITORING CONTINUOUS     EKG Readings: (Independently Interpreted)   Rhythm: Normal Sinus Rhythm. Heart Rate: 65. Conduction: LBBB. T Waves: Normal. Axis: Normal.   8/24/23 1644       Imaging Results    None          Medications   sodium chloride 0.9% flush 10 mL (has no administration in time range)   0.9%  NaCl infusion (has no administration in time range)   insulin regular in 0.9 % NaCl 100 unit/100 mL (1 unit/mL) infusion (0.091 Units/kg/hr × 77.1 kg Intravenous New Bag 8/24/23 2003)   0.9%  NaCl infusion (0 mLs Intravenous Stopped 8/24/23 1700)   insulin regular injection 10 Units 0.1 mL (10 Units Intravenous Given 8/24/23 1756)   0.9%  NaCl infusion (1,000 mLs Intravenous New Bag 8/24/23 1815)   insulin regular injection 10 Units 0.1 mL (10 Units Intravenous Given 8/24/23 1945)     Medical Decision Making  Amount and/or Complexity of Data Reviewed  Labs: ordered.    Risk  OTC drugs.  Prescription drug management.               ED Course as of 08/24/23 2051   Thu Aug 24, 2023   1830 Care is transferred to Dr. Landon at shift change.  [GA]      ED Course User Index  [GA] Chris Juarez MD                    Clinical Impression:   Final diagnoses:  [W19.XXXA] Fall  [R73.9] Hyperglycemia (Primary)  [F32.A] Depression, unspecified depression type        ED Disposition Condition    Admit Stable                Yunier Avila MD  08/24/23 2051

## 2023-08-25 PROBLEM — E11.00 HYPEROSMOLAR HYPERGLYCEMIC STATE (HHS): Status: ACTIVE | Noted: 2023-08-25

## 2023-08-25 LAB
ALBUMIN SERPL-MCNC: 3 G/DL (ref 3.4–4.8)
ALBUMIN/GLOB SERPL: 0.9 RATIO (ref 1.1–2)
ALP SERPL-CCNC: 78 UNIT/L (ref 40–150)
ALT SERPL-CCNC: 13 UNIT/L (ref 0–55)
AST SERPL-CCNC: 19 UNIT/L (ref 5–34)
BASOPHILS # BLD AUTO: 0.03 X10(3)/MCL
BASOPHILS NFR BLD AUTO: 0.3 %
BILIRUB SERPL-MCNC: 1 MG/DL
BUN SERPL-MCNC: 14.7 MG/DL (ref 8.4–25.7)
CALCIUM SERPL-MCNC: 8.4 MG/DL (ref 8.8–10)
CHLORIDE SERPL-SCNC: 98 MMOL/L (ref 98–107)
CO2 SERPL-SCNC: 22 MMOL/L (ref 23–31)
CREAT SERPL-MCNC: 0.86 MG/DL (ref 0.73–1.18)
EOSINOPHIL # BLD AUTO: 0.18 X10(3)/MCL (ref 0–0.9)
EOSINOPHIL NFR BLD AUTO: 2 %
ERYTHROCYTE [DISTWIDTH] IN BLOOD BY AUTOMATED COUNT: 12.4 % (ref 11.5–17)
FIO2: 21
GFR SERPLBLD CREATININE-BSD FMLA CKD-EPI: >60 MLS/MIN/1.73/M2
GLOBULIN SER-MCNC: 3.3 GM/DL (ref 2.4–3.5)
GLUCOSE SERPL-MCNC: 492 MG/DL (ref 82–115)
HCO3 UR-SCNC: 22 MMOL/L (ref 24–28)
HCT VFR BLD AUTO: 43.8 % (ref 42–52)
HGB BLD-MCNC: 15.1 G/DL (ref 14–18)
IMM GRANULOCYTES # BLD AUTO: 0.02 X10(3)/MCL (ref 0–0.04)
IMM GRANULOCYTES NFR BLD AUTO: 0.2 %
LYMPHOCYTES # BLD AUTO: 2.39 X10(3)/MCL (ref 0.6–4.6)
LYMPHOCYTES NFR BLD AUTO: 26.4 %
MCH RBC QN AUTO: 30 PG (ref 27–31)
MCHC RBC AUTO-ENTMCNC: 34.5 G/DL (ref 33–36)
MCV RBC AUTO: 86.9 FL (ref 80–94)
MONOCYTES # BLD AUTO: 0.58 X10(3)/MCL (ref 0.1–1.3)
MONOCYTES NFR BLD AUTO: 6.4 %
NEUTROPHILS # BLD AUTO: 5.86 X10(3)/MCL (ref 2.1–9.2)
NEUTROPHILS NFR BLD AUTO: 64.7 %
PCO2 BLDA: 37.8 MMHG (ref 35–45)
PH SMN: 7.37 [PH] (ref 7.35–7.45)
PLATELET # BLD AUTO: 93 X10(3)/MCL (ref 130–400)
PMV BLD AUTO: 12.2 FL (ref 7.4–10.4)
PO2 BLDA: 73 MMHG (ref 80–100)
POC BE: -3 MMOL/L
POC SATURATED O2: 94 % (ref 95–100)
POC TCO2: 23 MMOL/L (ref 23–27)
POCT GLUCOSE: 284 MG/DL (ref 70–110)
POCT GLUCOSE: 291 MG/DL (ref 70–110)
POCT GLUCOSE: 340 MG/DL (ref 70–110)
POTASSIUM SERPL-SCNC: 4.5 MMOL/L (ref 3.5–5.1)
PROT SERPL-MCNC: 6.3 GM/DL (ref 5.8–7.6)
RBC # BLD AUTO: 5.04 X10(6)/MCL (ref 4.7–6.1)
SAMPLE: ABNORMAL
SODIUM SERPL-SCNC: 134 MMOL/L (ref 136–145)
WBC # SPEC AUTO: 9.06 X10(3)/MCL (ref 4.5–11.5)

## 2023-08-25 PROCEDURE — 92523 SPEECH SOUND LANG COMPREHEN: CPT

## 2023-08-25 PROCEDURE — 96361 HYDRATE IV INFUSION ADD-ON: CPT

## 2023-08-25 PROCEDURE — 82803 BLOOD GASES ANY COMBINATION: CPT

## 2023-08-25 PROCEDURE — G0378 HOSPITAL OBSERVATION PER HR: HCPCS

## 2023-08-25 PROCEDURE — 25000003 PHARM REV CODE 250: Performed by: FAMILY MEDICINE

## 2023-08-25 PROCEDURE — 97161 PT EVAL LOW COMPLEX 20 MIN: CPT

## 2023-08-25 PROCEDURE — 97535 SELF CARE MNGMENT TRAINING: CPT

## 2023-08-25 PROCEDURE — 11000001 HC ACUTE MED/SURG PRIVATE ROOM

## 2023-08-25 PROCEDURE — 36600 WITHDRAWAL OF ARTERIAL BLOOD: CPT

## 2023-08-25 PROCEDURE — 80053 COMPREHEN METABOLIC PANEL: CPT | Performed by: FAMILY MEDICINE

## 2023-08-25 PROCEDURE — 63600175 PHARM REV CODE 636 W HCPCS: Performed by: STUDENT IN AN ORGANIZED HEALTH CARE EDUCATION/TRAINING PROGRAM

## 2023-08-25 PROCEDURE — 25000003 PHARM REV CODE 250: Performed by: STUDENT IN AN ORGANIZED HEALTH CARE EDUCATION/TRAINING PROGRAM

## 2023-08-25 PROCEDURE — 96372 THER/PROPH/DIAG INJ SC/IM: CPT | Performed by: STUDENT IN AN ORGANIZED HEALTH CARE EDUCATION/TRAINING PROGRAM

## 2023-08-25 PROCEDURE — 94760 N-INVAS EAR/PLS OXIMETRY 1: CPT

## 2023-08-25 PROCEDURE — 85025 COMPLETE CBC W/AUTO DIFF WBC: CPT | Performed by: FAMILY MEDICINE

## 2023-08-25 PROCEDURE — 97165 OT EVAL LOW COMPLEX 30 MIN: CPT

## 2023-08-25 RX ORDER — ONDANSETRON 2 MG/ML
4 INJECTION INTRAMUSCULAR; INTRAVENOUS EVERY 8 HOURS PRN
Status: DISCONTINUED | OUTPATIENT
Start: 2023-08-25 | End: 2023-08-28 | Stop reason: HOSPADM

## 2023-08-25 RX ORDER — ENOXAPARIN SODIUM 100 MG/ML
1 INJECTION SUBCUTANEOUS EVERY 24 HOURS
Status: DISCONTINUED | OUTPATIENT
Start: 2023-08-25 | End: 2023-08-25

## 2023-08-25 RX ORDER — SODIUM CHLORIDE 0.9 % (FLUSH) 0.9 %
10 SYRINGE (ML) INJECTION
Status: DISCONTINUED | OUTPATIENT
Start: 2023-08-25 | End: 2023-08-28 | Stop reason: HOSPADM

## 2023-08-25 RX ORDER — ONDANSETRON 4 MG/1
8 TABLET, ORALLY DISINTEGRATING ORAL EVERY 8 HOURS PRN
Status: DISCONTINUED | OUTPATIENT
Start: 2023-08-25 | End: 2023-08-28 | Stop reason: HOSPADM

## 2023-08-25 RX ORDER — INSULIN ASPART 100 [IU]/ML
0-15 INJECTION, SOLUTION INTRAVENOUS; SUBCUTANEOUS
Status: DISCONTINUED | OUTPATIENT
Start: 2023-08-25 | End: 2023-08-28 | Stop reason: HOSPADM

## 2023-08-25 RX ORDER — HYDROCODONE BITARTRATE AND ACETAMINOPHEN 5; 325 MG/1; MG/1
1 TABLET ORAL EVERY 6 HOURS PRN
Status: DISCONTINUED | OUTPATIENT
Start: 2023-08-25 | End: 2023-08-28 | Stop reason: HOSPADM

## 2023-08-25 RX ORDER — NALOXONE HCL 0.4 MG/ML
0.02 VIAL (ML) INJECTION
Status: DISCONTINUED | OUTPATIENT
Start: 2023-08-25 | End: 2023-08-28 | Stop reason: HOSPADM

## 2023-08-25 RX ORDER — IPRATROPIUM BROMIDE AND ALBUTEROL SULFATE 2.5; .5 MG/3ML; MG/3ML
3 SOLUTION RESPIRATORY (INHALATION) EVERY 6 HOURS PRN
Status: DISCONTINUED | OUTPATIENT
Start: 2023-08-25 | End: 2023-08-25

## 2023-08-25 RX ORDER — SIMETHICONE 80 MG
1 TABLET,CHEWABLE ORAL 4 TIMES DAILY PRN
Status: DISCONTINUED | OUTPATIENT
Start: 2023-08-25 | End: 2023-08-28 | Stop reason: HOSPADM

## 2023-08-25 RX ORDER — POLYETHYLENE GLYCOL 3350 17 G/17G
17 POWDER, FOR SOLUTION ORAL 3 TIMES DAILY PRN
Status: DISCONTINUED | OUTPATIENT
Start: 2023-08-25 | End: 2023-08-28 | Stop reason: HOSPADM

## 2023-08-25 RX ORDER — SODIUM CHLORIDE 9 MG/ML
1000 INJECTION, SOLUTION INTRAVENOUS CONTINUOUS
Status: DISCONTINUED | OUTPATIENT
Start: 2023-08-25 | End: 2023-08-28 | Stop reason: HOSPADM

## 2023-08-25 RX ORDER — INSULIN ASPART 100 [IU]/ML
15 INJECTION, SOLUTION INTRAVENOUS; SUBCUTANEOUS ONCE
Status: COMPLETED | OUTPATIENT
Start: 2023-08-25 | End: 2023-08-25

## 2023-08-25 RX ORDER — IBUPROFEN 200 MG
16 TABLET ORAL
Status: DISCONTINUED | OUTPATIENT
Start: 2023-08-25 | End: 2023-08-28 | Stop reason: HOSPADM

## 2023-08-25 RX ORDER — ACETAMINOPHEN 325 MG/1
650 TABLET ORAL EVERY 4 HOURS PRN
Status: DISCONTINUED | OUTPATIENT
Start: 2023-08-25 | End: 2023-08-28 | Stop reason: HOSPADM

## 2023-08-25 RX ORDER — TALC
9 POWDER (GRAM) TOPICAL NIGHTLY PRN
Status: DISCONTINUED | OUTPATIENT
Start: 2023-08-25 | End: 2023-08-28 | Stop reason: HOSPADM

## 2023-08-25 RX ORDER — ENOXAPARIN SODIUM 100 MG/ML
1 INJECTION SUBCUTANEOUS EVERY 12 HOURS
Status: DISCONTINUED | OUTPATIENT
Start: 2023-08-25 | End: 2023-08-28 | Stop reason: HOSPADM

## 2023-08-25 RX ORDER — MAG HYDROX/ALUMINUM HYD/SIMETH 200-200-20
30 SUSPENSION, ORAL (FINAL DOSE FORM) ORAL 4 TIMES DAILY PRN
Status: DISCONTINUED | OUTPATIENT
Start: 2023-08-25 | End: 2023-08-28 | Stop reason: HOSPADM

## 2023-08-25 RX ORDER — MORPHINE SULFATE 4 MG/ML
2 INJECTION, SOLUTION INTRAMUSCULAR; INTRAVENOUS EVERY 6 HOURS PRN
Status: DISCONTINUED | OUTPATIENT
Start: 2023-08-25 | End: 2023-08-28 | Stop reason: HOSPADM

## 2023-08-25 RX ORDER — GLUCAGON 1 MG
1 KIT INJECTION
Status: DISCONTINUED | OUTPATIENT
Start: 2023-08-25 | End: 2023-08-28 | Stop reason: HOSPADM

## 2023-08-25 RX ORDER — INSULIN ASPART 100 [IU]/ML
20 INJECTION, SOLUTION INTRAVENOUS; SUBCUTANEOUS
Status: DISCONTINUED | OUTPATIENT
Start: 2023-08-25 | End: 2023-08-27

## 2023-08-25 RX ORDER — IBUPROFEN 200 MG
24 TABLET ORAL
Status: DISCONTINUED | OUTPATIENT
Start: 2023-08-25 | End: 2023-08-28 | Stop reason: HOSPADM

## 2023-08-25 RX ORDER — BISACODYL 10 MG
10 SUPPOSITORY, RECTAL RECTAL DAILY PRN
Status: DISCONTINUED | OUTPATIENT
Start: 2023-08-25 | End: 2023-08-28 | Stop reason: HOSPADM

## 2023-08-25 RX ORDER — METOPROLOL TARTRATE 25 MG/1
25 TABLET, FILM COATED ORAL 2 TIMES DAILY
Status: DISCONTINUED | OUTPATIENT
Start: 2023-08-25 | End: 2023-08-25

## 2023-08-25 RX ORDER — METFORMIN HYDROCHLORIDE 500 MG/1
500 TABLET ORAL 2 TIMES DAILY WITH MEALS
Status: DISCONTINUED | OUTPATIENT
Start: 2023-08-25 | End: 2023-08-25

## 2023-08-25 RX ADMIN — INSULIN DETEMIR 30 UNITS: 100 INJECTION, SOLUTION SUBCUTANEOUS at 09:08

## 2023-08-25 RX ADMIN — ENOXAPARIN SODIUM 90 MG: 100 INJECTION SUBCUTANEOUS at 05:08

## 2023-08-25 RX ADMIN — INSULIN ASPART 15 UNITS: 100 INJECTION, SOLUTION INTRAVENOUS; SUBCUTANEOUS at 06:08

## 2023-08-25 RX ADMIN — INSULIN ASPART 20 UNITS: 100 INJECTION, SOLUTION INTRAVENOUS; SUBCUTANEOUS at 11:08

## 2023-08-25 RX ADMIN — INSULIN ASPART 20 UNITS: 100 INJECTION, SOLUTION INTRAVENOUS; SUBCUTANEOUS at 04:08

## 2023-08-25 RX ADMIN — ASPIRIN 162 MG: 81 TABLET, COATED ORAL at 08:08

## 2023-08-25 RX ADMIN — SODIUM CHLORIDE 1000 ML: 9 INJECTION, SOLUTION INTRAVENOUS at 02:08

## 2023-08-25 RX ADMIN — TRAZODONE HYDROCHLORIDE 25 MG: 50 TABLET ORAL at 09:08

## 2023-08-25 RX ADMIN — SODIUM CHLORIDE 1000 ML: 9 INJECTION, SOLUTION INTRAVENOUS at 11:08

## 2023-08-25 RX ADMIN — SODIUM CHLORIDE 1000 ML: 9 INJECTION, SOLUTION INTRAVENOUS at 09:08

## 2023-08-25 RX ADMIN — INSULIN ASPART 6 UNITS: 100 INJECTION, SOLUTION INTRAVENOUS; SUBCUTANEOUS at 09:08

## 2023-08-25 NOTE — PLAN OF CARE
Offered patient nursing home placement. Patient stated he does not want nursing home placement at this time. That his roommate mentioned it but it is his house and he does not want to go into a nursing home at this time.

## 2023-08-25 NOTE — PLAN OF CARE
[facility]  Initial Discharge Assessment       Primary Care Provider: Magda Hernandez MD    Admission Diagnosis: Fall [W19.XXXA]  Hyperglycemia [R73.9]  Depression, unspecified depression type [F32.A]    Admission Date: 8/24/2023  Expected Discharge Date:     Transition of Care Barriers: None    Payor: HUMANA MANAGED MEDICARE / Plan: HUMANA SNP HMO PPO SPECIAL NEEDS / Product Type: Medicare Advantage /     Extended Emergency Contact Information  Primary Emergency Contact: Carlyn Munguia  Home Phone: [phone]  Relation: Friend  Preferred language: English   needed? No    Discharge Plan A: Home         [id number] - [place], [place] - [address] AT [facility]  [address] [facility] [place] [address]  Phone: [phone] Fax: [phone]    [facility] [address] [address]  Phone: [phone] Fax: [phone]      Initial Assessment (most recent)       Adult Discharge Assessment - 08/25/23 1642          Discharge Assessment    Assessment Type Discharge Planning Assessment     Source of Information patient     If unable to respond/provide information was family/caregiver contacted? No Contact Information Available     Does patient/caregiver understand observation status Yes     Communicated GUTIERREZ with patient/caregiver Date not available/Unable to determine     Reason For Admission Fall     People in Home alone     Facility Arrived From: home     Do you expect to return to your current living situation? Yes     Do you have help at home or someone to help you manage your care at home? No     Prior to hospitilization cognitive status: Alert/Oriented     Current cognitive status: Alert/Oriented     Walking or Climbing Stairs ambulation difficulty, requires equipment     Mobility Management walker     Home Accessibility wheelchair accessible     Home Layout Able to live on 1st floor      Equipment Currently Used at Home bedside commode;walker, standard     Readmission within 30 days? No     Patient currently being followed by outpatient case management? No     Do you currently have service(s) that help you manage your care at home? No     Do you take prescription medications? Yes     Do you have prescription coverage? Yes     Coverage [employer]     Do you have any problems affording any of your prescribed medications? TBD     Is the patient taking medications as prescribed? yes     How do you get to doctors appointments? family or friend will provide     Are you on dialysis? No     Do you take coumadin? No     DME Needed Upon Discharge  none     Transition of Care Barriers None     Discharge Plan A Home        Physical Activity    On average, how many days per week do you engage in moderate to strenuous exercise (like a brisk walk)? 0 days     On average, how many minutes do you engage in exercise at this level? 0 min        Financial Resource Strain    How hard is it for you to pay for the very basics like food, housing, medical care, and heating? Not hard at all        Housing Stability    In the last 12 months, was there a time when you were not able to pay the mortgage or rent on time? No     In the last 12 months, how many places have you lived? 1     In the last 12 months, was there a time when you did not have a steady place to sleep or slept in a shelter (including now)? No        Transportation Needs    In the past 12 months, has lack of transportation kept you from medical appointments or from getting medications? No     In the past 12 months, has lack of transportation kept you from meetings, work, or from getting things needed for daily living? No        Food Insecurity    Within the past 12 months, you worried that your food would run out before you got the money to buy more. Never true     Within the past 12 months, the food you bought just didn't last and you didn't have money to get  more. Never true        Stress    Do you feel stress - tense, restless, nervous, or anxious, or unable to sleep at night because your mind is troubled all the time - these days? Only a little        Social Connections    In a typical week, how many times do you talk on the phone with family, friends, or neighbors? Once a week     How often do you get together with friends or relatives? Once a week     How often do you attend Mormonism or Religion services? 1 to 4 times per year     Do you belong to any clubs or organizations such as Mormonism groups, unions, fraternal or athletic groups, or school groups? No     How often do you attend meetings of the clubs or organizations you belong to? Never     Are you , , , , never , or living with a partner? Living with partner        Alcohol Use    Q1: How often do you have a drink containing alcohol? Never     Q2: How many drinks containing alcohol do you have on a typical day when you are drinking? Patient does not drink     Q3: How often do you have six or more drinks on one occasion? Never

## 2023-08-25 NOTE — PLAN OF CARE
Problem: Physical Therapy  Goal: Physical Therapy Goal  Description: Goals to be met by: Discharge     Patient will increase functional independence with mobility by performin. Sit <> stand transfers with use of % of the time without cues, demonstrating proper safety and sequencing at Supervision  2. Bed <> chair transfer with Supervision using Rolling Walker 100% of the time without cues.   3. Gait  x 50 feet with Supervision using Rolling Walker 100 of the time without cues.   4. Increased functional strength to 4/5 for B LE.    Outcome: Ongoing, Progressing

## 2023-08-25 NOTE — PT/OT/SLP EVAL
Physical Therapy Evaluation and Treatment    Patient Name: John Mancia Jr.   MRN: [id number]  Recent Surgery: * No surgery found *      Recommendations:     Discharge Recommendations: nursing facility, basic, nursing facility, skilled, assisted living facility   Discharge Equipment Recommendations: none   Barriers to discharge: Increased level of assist, Decreased caregiver support, and Ongoing medical treatment    Assessment:     John Mancia Jr. is a 72 y.o. male admitted with a medical diagnosis of Hyperosmolar hyperglycemic state (HHS). He presents with the following impairments/functional limitations: weakness, impaired endurance, decreased safety awareness. Per ED notes pt had falls at home, frequently confused, urinates on himself and falls.  He has numerous cats in his home and on arrival he is covered in cat feces which is caked on his feet and hands. Recommending NH, but pt refuses.      Rehab Prognosis: Fair; patient would benefit from acute PT services to address these deficits and reach maximum level of function.    Plan:     During this hospitalization, patient to be seen 6 x/week (1-2 times per day) to address the above listed problems via gait training, therapeutic activities, therapeutic exercises    Plan of Care Expires: 09/01/23    Subjective     Chief Complaint: Fatigue  Patient Comments/Goals: go home  Pain/Comfort:  Pain Rating 1: 0/10  Pain Rating Post-Intervention 1: 0/10    Social History:  Living Environment: Patient lives with their roommate(s) in a single story home with tub only  Prior Level of Function: Prior to admission, patient requires assistance with ADLs including bathing. Pt reported walking on his own but holding onto furniture.   Equipment Used at Home: walker, rolling  DME owned (not currently used): rolling walker  Assistance Upon Discharge: roommate(s)    Objective:     Communicated with Nursing and OT prior to session. Patient found HOB elevated with telemetry and IV  upon PT entry to room.    General Precautions: Standard,     Orthopedic Precautions:     Braces:      Respiratory Status: Room air    Exams:  Cognition: Patient is oriented to Person and Place  RLE ROM: WNL  RLE Strength: Deficits: 3-/5 to 3/5 - edema noted  LLE ROM: WNL  LLE Strength: Deficits: 3 to 3+/5       Functional Mobility:  Gait belt applied - Yes  Bed Mobility  Rolling Left: stand by assistance  Rolling Right: stand by assistance  Supine<>Sit: stand by assistance   Transfers - Decreased safety awareness, would forget use of RW - needing HHA, Cues provided 75% of the time for safety.   Sit to Stand: contact guard assistance and minimum assistance with hand-held assist  Bed to Chair: contact guard assistance with hand-held assist using Stand Pivot  Toilet Transfer: stand by assistance and contact guard assistance with hand-held assist using Stand Pivot and grab bar  Gait  Patient ambulated 2 x 15 with hand-held assist and contact guard assistance. Patient demonstrates  decreased safety awareness and poor use of RW .   Balance  Sitting: stand by assistance  Standing: contact guard assistance      Therapeutic Activities and Exercises:   Patient educated on role of acute care PT and PT POC, safety while in hospital including calling nurse for mobility, and call light usage  Nursing educated on functional levels and due to safety pt should not be left alone on toilet or BSC    AM-PAC 6 CLICK MOBILITY  Total Score:18    Patient left HOB elevated with all lines intact, call button in reach, RN notified, and bed alarm on.    GOALS:   Multidisciplinary Problems       Physical Therapy Goals          Problem: Physical Therapy    Goal Priority Disciplines Outcome Goal Variances Interventions   Physical Therapy Goal     PT, PT/OT Ongoing, Progressing     Description: Goals to be met by: Discharge     Patient will increase functional independence with mobility by performin. Sit <> stand transfers with use of RW  100% of the time without cues, demonstrating proper safety and sequencing at Supervision  2. Bed <> chair transfer with Supervision using Rolling Walker 100% of the time without cues.   3. Gait  x 50 feet with Supervision using Rolling Walker 100 of the time without cues.   4. Increased functional strength to 4/5 for B LE.                         History:     Past Medical History:   Diagnosis Date    Arthritis     Bipolar disorder, unspecified     CHF (congestive heart failure)     Coronary artery disease     Diabetes mellitus     GERD (gastroesophageal reflux disease)     High cholesterol     Hx of eye surgery     Hypertension     ST elevation (STEMI) myocardial infarction of unspecified site     Stroke        Past Surgical History:   Procedure Laterality Date    CORONARY ARTERY BYPASS GRAFT      KNEE SURGERY         Time Tracking:     PT Received On: 08/25/23  PT Start Time: 0916  PT Stop Time: 0945  PT Total Time (min): 29 min     Billable Minutes: Evaluation low complexity     8/25/2023

## 2023-08-25 NOTE — PLAN OF CARE
Problem: Adult Inpatient Plan of Care  Goal: Plan of Care Review  Outcome: Ongoing, Progressing  Flowsheets (Taken 8/25/2023 0927)  Plan of Care Reviewed With: patient  Goal: Patient-Specific Goal (Individualized)  Outcome: Ongoing, Progressing  Flowsheets (Taken 8/25/2023 0927)  Anxieties, Fears or Concerns: none voiced  Individualized Care Needs: monitor cbg, ivf  Goal: Absence of Hospital-Acquired Illness or Injury  Outcome: Ongoing, Progressing  Intervention: Identify and Manage Fall Risk  Flowsheets (Taken 8/25/2023 0927)  Safety Promotion/Fall Prevention:   assistive device/personal item within reach   bed alarm set   instructed to call staff for mobility   side rails raised x 3   nonskid shoes/socks when out of bed   lighting adjusted   medications reviewed   gait belt with ambulation   high risk medications identified  Intervention: Prevent Skin Injury  Flowsheets (Taken 8/25/2023 0927)  Body Position: position changed independently  Skin Protection:   incontinence pads utilized   protective footwear used   adhesive use limited  Intervention: Prevent and Manage VTE (Venous Thromboembolism) Risk  Flowsheets (Taken 8/25/2023 0927)  Activity Management: Walk with assistive devise and /or staff member - L3  VTE Prevention/Management:   bleeding precations maintained   bleeding risk assessed   ambulation promoted   fluids promoted   intravenous hydration  Range of Motion: active ROM (range of motion) encouraged  Intervention: Prevent Infection  Flowsheets (Taken 8/25/2023 0927)  Infection Prevention:   hand hygiene promoted   equipment surfaces disinfected   environmental surveillance performed   cohorting utilized   personal protective equipment utilized   rest/sleep promoted     Problem: Skin Injury Risk Increased  Goal: Skin Health and Integrity  Outcome: Ongoing, Progressing  Intervention: Optimize Skin Protection  Flowsheets (Taken 8/25/2023 0927)  Pressure Reduction Techniques:   frequent weight shift  encouraged   heels elevated off bed   pressure points protected   rest period provided between sit times   weight shift assistance provided  Skin Protection:   incontinence pads utilized   protective footwear used   adhesive use limited  Head of Bed (HOB) Positioning: HOB at 30-45 degrees  Intervention: Promote and Optimize Oral Intake  Flowsheets (Taken 8/25/2023 0927)  Oral Nutrition Promotion:   rest periods promoted   physical activity promoted   safe use of adaptive equipment encouraged     Problem: Fall Injury Risk  Goal: Absence of Fall and Fall-Related Injury  Outcome: Ongoing, Progressing  Intervention: Identify and Manage Contributors  Flowsheets (Taken 8/25/2023 0927)  Self-Care Promotion:   independence encouraged   BADL personal objects within reach   BADL personal routines maintained   safe use of adaptive equipment encouraged   meal set-up provided  Medication Review/Management: medications reviewed  Intervention: Promote Injury-Free Environment  Flowsheets (Taken 8/25/2023 0927)  Safety Promotion/Fall Prevention:   assistive device/personal item within reach   bed alarm set   instructed to call staff for mobility   side rails raised x 3   nonskid shoes/socks when out of bed   lighting adjusted   medications reviewed   gait belt with ambulation   high risk medications identified     Problem: Diabetes Comorbidity  Goal: Blood Glucose Level Within Targeted Range  Outcome: Ongoing, Progressing  Intervention: Monitor and Manage Glycemia  Flowsheets (Taken 8/25/2023 0927)  Glycemic Management:   blood glucose monitored   supplemental insulin given     Problem: Mobility Impairment  Goal: Optimal Mobility  Outcome: Ongoing, Progressing  Intervention: Optimize Mobility  Flowsheets (Taken 8/25/2023 0927)  Assistive Device Utilized: walker  Activity Management: Walk with assistive devise and /or staff member - L3  Positioning/Transfer Devices: pillows     Problem: Electrolyte Imbalance  Goal: Electrolyte  Balance  Outcome: Ongoing, Progressing  Intervention: Monitor and Manage Electrolyte Imbalance  Flowsheets (Taken 8/25/2023 0361)  Fluid/Electrolyte Management: fluids provided

## 2023-08-25 NOTE — H&P
[facility]  [facility] MEDICINE - H&P ADMISSION NOTE    Patient Name: John Mancia Jr.  MRN: [id number]  Patient Class: OP- Observation   Admission Date: 8/24/2023   Admitting Physician: COLETTE Service   Attending Physician: Thairy G Reyes, DO  Primary Care Provider: Magda Hernandez MD  Face-to-Face encounter date: 08/25/2023      CHIEF COMPLAINT     Chief Complaint   Patient presents with    Fall     Pt arrived by [facility] patient slipped and fell having back pain denies loc     HISTORY OF PRESENTING ILLNESS   72-year-old male with past medical history of insulin-dependent diabetes mellitus, CAD status post CABG, hyperlipidemia, hypertension, bipolar disorder who presents to the ED after his roommate called an ambulance after the patient fell at home.  In the ED patient found be hyperglycemic in the 600s admitted for management of blood glucose.    PAST MEDICAL HISTORY     Past Medical History:   Diagnosis Date    Arthritis     Bipolar disorder, unspecified     CHF (congestive heart failure)     Coronary artery disease     Diabetes mellitus     GERD (gastroesophageal reflux disease)     High cholesterol     Hx of eye surgery     Hypertension     ST elevation (STEMI) myocardial infarction of unspecified site     Stroke        PAST SURGICAL HISTORY     Past Surgical History:   Procedure Laterality Date    CORONARY ARTERY BYPASS GRAFT      KNEE SURGERY         FAMILY HISTORY   Reviewed and noncontributory to this case    SOCIAL HISTORY     Social History     Socioeconomic History    Marital status: Single   Tobacco Use    Smoking status: Former    Smokeless tobacco: Never     Social Determinants of Health     Transportation Needs: No Transportation Needs (3/10/2023)    PRAPARE - Transportation     Lack of Transportation (Medical): No     Lack of Transportation (Non-Medical): No   Housing Stability: Unknown (3/10/2023)    Housing Stability Vital Sign     Unable to Pay for Housing in  the Last Year: No       HOME MEDICATIONS     Prior to Admission medications    Medication Sig Start Date End Date Taking? Authorizing Provider   aspirin (ECOTRIN) 81 MG EC tablet Take 162 mg by mouth once daily.    Provider, Historical   coffee xt-phosphatidyl serine 50-50 mg Chew Take 1 tablet by mouth once daily.    Provider, Historical   colesevelam (WELCHOL) 625 mg tablet Take 1,875 mg by mouth.    Provider, Historical   cyanocobalamin 500 MCG tablet Take 500 mcg by mouth.    Provider, Historical   evolocumab (REPATHA PUSHTRONEX) 420 mg/3.5 mL Injt Inject 420 mg into the skin. 4/30/22   Provider, Historical   fenofibrate micronized (ANTARA) 130 MG capsule Take 130 mg by mouth. 7/20/22   Provider, Historical   insulin glargine 100 units/mL SubQ pen Inject 77 Units into the skin. 1/27/23   Provider, Historical   magnesium oxide (MAG-OX) 400 mg (241.3 mg magnesium) tablet Take 1 tablet by mouth. 12/9/22   Provider, Historical   metFORMIN (GLUCOPHAGE) 500 MG tablet Take 1 tablet (500 mg total) by mouth 2 (two) times daily with meals. 3/11/23   Delma Lane MD   miconazole NITRATE 2 % (MICOTIN) 2 % top powder Apply topically 2 (two) times daily.  Patient not taking: Reported on 3/14/2023 3/11/23   Delma Lane MD   nebivoloL (BYSTOLIC) 5 MG Tab Take 7.5 mg by mouth once daily. 1/11/23   Provider, Historical   traZODone (DESYREL) 50 MG tablet Take 25 mg by mouth every evening. 10/3/22   Provider, Historical   VICTOZA 3-FREDDY 0.6 mg/0.1 mL (18 mg/3 mL) PnIj pen Inject 1.8 mg into the skin once daily. 2/7/23   Provider, Historical   zinc oxide-cod liver oil (DESITIN) 40 % Pste paste Apply topically 2 (two) times a day. 3/11/23   Delma Lane MD       ALLERGIES   Latex  REVIEW OF SYSTEMS   Except as documented above, all other systems reviewed and negative    PHYSICAL EXAM     Vitals:    08/25/23 1139   BP: (!) 100/55   Pulse: (!) 42   Resp:    Temp: 97.6 °F (36.4 °C)      General:  Or personal  hygiene  Head and neck:  Atraumatic, normocephalic, moist mucous membranes, supple neck  Chest:  Clear to auscultation bilaterally  Heart:  S1, S2, grade 2 murmur  Abdomen:  Soft, nontender, BS +  MSK:  Warm, no lower extremity edema, no clubbing or cyanosis  Neuro:  Alert and oriented x4, moving all extremities with good strength  Integumentary:  No obvious skin rash  Psychiatry:  Appropriate mood and affect  ASSESSMENT AND PLAN   HHS   Insulin-dependent diabetes mellitus   -ABG on room air 7.37/37.8/73  -titrating insulin    CAD status post CABG  Asymptomatic bradycardia  -continue aspirin   -patient takes Repatha and fenofibrate at home  -holding metoprolol in the setting of asymptomatic bradycardia    DVT prophylaxis: SCD in the setting of thrombocytopenia  Admit to observation status under my care   Critical care time 30 minutes for hyperglycemia requiring insulin drip  __________________________________________________________________  LABS/MICRO/MEDS/DIAGNOSTICS       LABS  Recent Labs     08/25/23  0332   *   K 4.5   CHLORIDE 98   CO2 22*   BUN 14.7   CREATININE 0.86   GLUCOSE 492*   CALCIUM 8.4*   ALKPHOS 78   AST 19   ALT 13   ALBUMIN 3.0*     Recent Labs     08/25/23  0332   WBC 9.06   RBC 5.04   HCT 43.8   MCV 86.9   PLT 93*       MICROBIOLOGY  Microbiology Results (last 7 days)       Procedure Component Value Units Date/Time    Blood culture #1 **CANNOT BE ORDERED STAT** [[id number]] Collected: 08/24/23 1654    Order Status: Resulted Specimen: Blood Updated: 08/24/23 1654    Blood culture #2 **CANNOT BE ORDERED STAT** [[id number]] Collected: 08/24/23 1653    Order Status: Resulted Specimen: Blood Updated: 08/24/23 1653            MEDICATIONS   aspirin  162 mg Oral Daily    insulin aspart U-100  20 Units Subcutaneous TIDWM    insulin detemir U-100  30 Units Subcutaneous QHS    traZODone  25 mg Oral QHS      INFUSIONS   sodium chloride 0.9% 100 mL/hr at 08/25/23 1137       DIAGNOSTIC TESTS  No orders  to display          Patient information was obtained from patient, patient's family, past medical records and ER records.   All diagnosis and differential diagnosis have been reviewed; assessment and plan has been documented. I have personally reviewed the labs and test results that are presently available; I have reviewed the patients medication list. I have reviewed the consulting providers response and recommendations. I have reviewed or attempted to review medical records based upon their availability.  All of the patient's questions have been addressed and answered. Patient's is agreeable to the above stated plan. I will continue to monitor closely and make adjustments to medical management as needed.  This note was created using [employer] voice recognition software that occasionally misinterpreted phrases or words.  Please contact me if any questions may rise regarding documentation to clarify verbiage.        Thairy G Reyes, DO   Internal Medicine  Department of Hospital Medicine  [facility]

## 2023-08-25 NOTE — PT/OT/SLP EVAL
Occupational Therapy Evaluation and Treatment    Name: John Mancia Jr.  MRN: [id number]  Admitting Diagnosis: Hyperosmolar hyperglycemic state (HHS)  Recent Surgery: * No surgery found *      Recommendations:     Discharge Recommendations: home  Level of Assistance Recommended: Intermittent assistance   Discharge Equipment Recommendations: none  Barriers to discharge:      Assessment:     John Mancia Jr. is a 72 y.o. male with a medical diagnosis of Hyperosmolar hyperglycemic state (HHS). He states he takes a little longer to do his self-care, but he is able to do it on his own. The pt's roommate assists him when getting out of the tub. He does not warrant occupational therapy services at this time.         Plan:     Patient does not warrant therapy services at this time.   Plan of Care Reviewed with: patient    Subjective     Chief Complaint: Discomfort at sacrum  Patient Comments/Goals: To go home  Pain/Comfort:  Pain Rating 1: 0/10    Patients cultural, spiritual, Mormon conflicts given the current situation: no    Social History:  Living Environment: Patient  lives with a roommate  in a single story home, number of outside stairs: 0, tub-shower combo.  Prior Level of Function: Prior to admission, patient  was modified independent with self-care and uses either a RW or furniture surfs to get around his home .  Roles and Routines: Retired  Equipment Used at Home: walker, rolling, grab bar  DME owned (not currently used): rolling walker  Assistance Upon Discharge:  roommate; he states his roommate is able to assist him all day .    Objective:     Communicated with PT prior to session. Patient found supine with   upon OT entry to room.    General Precautions: Standard,     Orthopedic Precautions:    Braces:    Respiratory Status: Room air    Occupational Performance    Bed Mobility:  Rolling/Turning to Left with modified independence  Rolling/Turning to Right with modified independence  Scooting anteriorly  to EOB to have both feet planted on floor: modified independence  Supine to sit from left side of bed with modified independence  Sit to Supine with modified independence on left side of bed    Functional Mobility/Transfers:  Sit <> Stand Transfer with contact guard assistance with rolling walker  Toilet Transfer Step Transfer technique with stand by assistance with hand-held assist  Functional Mobility: FM in room using a RW initially then HHA    Activities of Daily Living:  Feeding:  independence    Grooming: independence    Bathing: supervision    Upper Body Dressing: independence    Lower Body Dressing: supervision    Toileting: supervision      Cognitive/Visual Perceptual:  Cognitive/Psychosocial Skills:     -       Oriented to: Person, Place, Time, and Situation     Physical Exam:  Upper Extremity Range of Motion:     -       Right Upper Extremity: WNL  -       Left Upper Extremity: WNL  Upper Extremity Strength:    -       Right Upper Extremity: WNL  -       Left Upper Extremity: WNL  Gross motor coordination:   WFL    AMPAC 6 Click ADL:  AMPAC Total Score: 22    Treatment & Education:  Patient educated on role of OT, POC and goals for therapy      Patient clear to ambulate to/from bathroom with RN/PCT, assist xCGA using HHA with gait belt. Do not leave patient alone when toileting .    Patient left HOB elevated with all lines intact, call button in reach, RN notified, and bed alarm on.    GOALS:   Multidisciplinary Problems       Occupational Therapy Goals       Not on file                    History:     Past Medical History:   Diagnosis Date    Arthritis     Bipolar disorder, unspecified     CHF (congestive heart failure)     Coronary artery disease     Diabetes mellitus     GERD (gastroesophageal reflux disease)     High cholesterol     Hx of eye surgery     Hypertension     ST elevation (STEMI) myocardial infarction of unspecified site     Stroke          Past Surgical History:   Procedure Laterality  Date    CORONARY ARTERY BYPASS GRAFT      KNEE SURGERY         Time Tracking:     OT Date of Treatment: 08/25/23  OT Start Time: 0916  OT Stop Time: 0946  OT Total Time (min): 30 min    Billable Minutes: Evaluation 30 8/25/2023

## 2023-08-25 NOTE — PT/OT/SLP EVAL
Speech Language Pathology Evaluation  Cognitive Communication    Patient Name:  John Mancia Jr.   MRN:  [id number]  Admitting Diagnosis: Hyperosmolar hyperglycemic state (HHS)    Recommendations:     Recommendations:                General Recommendations:  Cognitive-linguistic therapy  Diet recommendations:  Regular Diet - IDDSI Level 7, Thin liquids - IDDSI Level 0   Aspiration Precautions: HOB to 90 degrees   General Precautions: Standard, fall  Communication strategies:  go to room if call light pushed    Assessment:     John Mancia Jr. is a 72 y.o. male with an SLP diagnosis of Cognitive-Linguistic Impairment.  He presents with impaired SHORT-TERM MEMORY recall, orientation, problem solving, thought organization, and safety awareness. Patient would benefit from skilled SPEECH-LANGUAGE PATHOLOGIST services to ensure safety upon discharge home.    History:     Past Medical History:   Diagnosis Date    Arthritis     Bipolar disorder, unspecified     CHF (congestive heart failure)     Coronary artery disease     Diabetes mellitus     GERD (gastroesophageal reflux disease)     High cholesterol     Hx of eye surgery     Hypertension     ST elevation (STEMI) myocardial infarction of unspecified site     Stroke        Past Surgical History:   Procedure Laterality Date    CORONARY ARTERY BYPASS GRAFT      KNEE SURGERY         Social History: Patient lives with roommate.    Prior Intubation HX:      Modified Barium Swallow:     Chest X-Rays:     Prior diet: regular/thin    Occupation/hobbies/homemaking: Patient reports no longer working. Patient reports driving and managing medications. Patient denies cooking at home.      Subjective     Patient laying in bed w/ eyes closed upon SPEECH-LANGUAGE PATHOLOGIST entry. Patient pleasant and agreeable to eval. Patient cooperative though w/ eyes closed majority of eval.    Patient goals:      Pain/Comfort:       Respiratory Status: Room air    Objective:     Cognitive  Status:    Orientation Person and Place  Memory Immediate Recall 2/3 words, Delayedimpaired, and long term recall 75% accuracy   Problem Solving Sequencing impaired , Solutions 75% accuracy , and Compare/contrast 50% accuracy   Safety awareness impaired       Receptive Language:   Comprehension:      Questions Complex yes/no 100% accuracy   Commands  two step basic commands 100% accuracy       Expressive Language:  Verbal:    WH Qs 100% accuracy   Automatic Speech  Sentence completion 100% accuracy   Sentence formulation WFL        Motor Speech:  WFL    Voice:   WFL    Visual-Spatial:  Did not assess    Reading:   Did not assess      Written Expression:   Did not assess      Brief Interview for Mental Status (BIMS) administered to which Patient scored 11/15 which indicates moderately impaired.      Treatment: skilled SPEECH-LANGUAGE PATHOLOGIST services for cognition are warranted to target above stated deficits and ensure safety upon discharge back home.    Goals:   Multidisciplinary Problems       SLP Goals          Problem: SLP    Goal Priority Disciplines Outcome   SLP Goal     SLP Ongoing, Progressing   Description: LTG: Patient will improve cognitive linguistic skills to ensure safe discharge home.    STG:  Patient will utilize memory strategies to recall new information following a 3 minute filled delay Digna.  Patient will orient x4 Halie.  Patient will complete thought organization tasks w/ 90% accuracy Digna.                       Plan:   Patient to be seen:  5 x/week   Plan of Care expires:  09/08/23  Plan of Care reviewed with:  patient   SLP Follow-Up:          Discharge recommendations:      Barriers to Discharge:  Decreased Care Giver Support    Time Tracking:     SLP Treatment Date:      Speech Start Time:  1115  Speech Stop Time:  1135     Speech Total Time (min):  20 min    Billable Minutes: Eval 20     Cheryl Ferrell M.S., CCC-SLP   08/25/2023

## 2023-08-25 NOTE — PLAN OF CARE
Problem: Adult Inpatient Plan of Care  Goal: Plan of Care Review  Outcome: Ongoing, Progressing  Flowsheets (Taken 8/25/2023 0031)  Plan of Care Reviewed With: patient  Goal: Patient-Specific Goal (Individualized)  Outcome: Ongoing, Progressing  Flowsheets (Taken 8/25/2023 0031)  Individualized Care Needs: monitor/manage blood sugar, IVF  Goal: Absence of Hospital-Acquired Illness or Injury  Outcome: Ongoing, Progressing  Intervention: Identify and Manage Fall Risk  Flowsheets (Taken 8/25/2023 0031)  Safety Promotion/Fall Prevention:   assistive device/personal item within reach   bed alarm set   commode/urinal/bedpan at bedside   Fall Risk reviewed with patient/family   gait belt with ambulation   muscle strengthening facilitated   nonskid shoes/socks when out of bed   side rails raised x 3   supervised activity   Supervised toileting - stay within arms reach   instructed to call staff for mobility  Intervention: Prevent Skin Injury  Flowsheets (Taken 8/25/2023 0031)  Body Position: position changed independently  Skin Protection: incontinence pads utilized  Intervention: Prevent and Manage VTE (Venous Thromboembolism) Risk  Flowsheets (Taken 8/25/2023 0031)  Activity Management: Walk with assistive devise and /or staff member - L3  VTE Prevention/Management:   ambulation promoted   bleeding precations maintained  Intervention: Prevent Infection  Flowsheets (Taken 8/25/2023 0031)  Infection Prevention:   cohorting utilized   environmental surveillance performed   hand hygiene promoted   single patient room provided   rest/sleep promoted  Goal: Optimal Comfort and Wellbeing  Outcome: Ongoing, Progressing  Intervention: Monitor Pain and Promote Comfort  Flowsheets (Taken 8/25/2023 0031)  Pain Management Interventions:   care clustered   quiet environment facilitated     Problem: Skin Injury Risk Increased  Goal: Skin Health and Integrity  Outcome: Ongoing, Progressing  Intervention: Optimize Skin  Protection  Flowsheets (Taken 8/25/2023 0031)  Pressure Reduction Techniques:   frequent weight shift encouraged   pressure points protected  Skin Protection: incontinence pads utilized  Head of Bed (HOB) Positioning: HOB at 20-30 degrees  Intervention: Promote and Optimize Oral Intake  Flowsheets (Taken 8/25/2023 0031)  Oral Nutrition Promotion:   calorie-dense foods provided   calorie-dense liquids provided   physical activity promoted   safe use of adaptive equipment encouraged     Problem: Fall Injury Risk  Goal: Absence of Fall and Fall-Related Injury  Outcome: Ongoing, Progressing  Intervention: Identify and Manage Contributors  Flowsheets (Taken 8/25/2023 0031)  Self-Care Promotion:   independence encouraged   BADL personal objects within reach   BADL personal routines maintained   safe use of adaptive equipment encouraged     Problem: Diabetes Comorbidity  Goal: Blood Glucose Level Within Targeted Range  Outcome: Ongoing, Progressing  Intervention: Monitor and Manage Glycemia  Flowsheets (Taken 8/25/2023 0031)  Glycemic Management:   blood glucose monitored   supplemental insulin given     Problem: Mobility Impairment  Goal: Optimal Mobility  Outcome: Ongoing, Progressing  Intervention: Optimize Mobility  Flowsheets (Taken 8/25/2023 0031)  Assistive Device Utilized:   gait belt   walker  Activity Management: Walk with assistive devise and /or staff member - L3     Problem: Electrolyte Imbalance  Goal: Electrolyte Balance  Outcome: Ongoing, Progressing  Intervention: Monitor and Manage Electrolyte Imbalance  Flowsheets (Taken 8/25/2023 0031)  Fluid/Electrolyte Management:   intravenous fluid replacement initiated   fluids provided

## 2023-08-25 NOTE — PLAN OF CARE
Problem: SLP  Goal: SLP Goal  Description: LTG: Patient will improve cognitive linguistic skills to ensure safe discharge home.    STG:  Patient will utilize memory strategies to recall new information following a 3 minute filled delay Digna.  Patient will orient x4 Halie.  Patient will complete thought organization tasks w/ 90% accuracy Digna.  Outcome: Ongoing, Progressing

## 2023-08-25 NOTE — PROGRESS NOTES
Inpatient Nutrition Evaluation    Admit Date: 8/24/2023   Total duration of encounter: 1 day    Nutrition Recommendation/Prescription     Continue diabetic diet.     Nutrition Assessment     Chart Review    Reason Seen: continuous nutrition monitoring and length of stay    Malnutrition Screening Tool Results   Have you recently lost weight without trying?: No  Have you been eating poorly because of a decreased appetite?: No   MST Score: 0     Diagnosis:  Hyperosmolar hyperglycemic state (HHS)    Relevant Medical History: Arthritis  Date Unknown  Bipolar disorder, unspecified  Date Unknown  CHF (congestive heart failure)  Date Unknown  Coronary artery disease  Date Unknown  Diabetes mellitus  Date Unknown  GERD (gastroesophageal reflux disease)  Date Unknown  High cholesterol  Date Unknown  Hx of eye surgery  Date Unknown  Hypertension  Date Unknown  ST elevation (STEMI) myocardial infarction of unspecified site  Date Unknown  Stroke    Nutrition-Related Medications: IVF 0.9% NaCl infusion 1,000 mL IV at 125 mL/hr, insulin aspart, insulin detemir,     Nutrition-Related Labs:   Latest Reference Range & Units 08/25/23 03:32   Sodium 136 - 145 mmol/L 134 (L)   Potassium 3.5 - 5.1 mmol/L 4.5   Chloride 98 - 107 mmol/L 98   CO2 23 - 31 mmol/L 22 (L)   BUN 8.4 - 25.7 mg/dL 14.7   Creatinine 0.73 - 1.18 mg/dL 0.86   eGFR mls/min/1.73/m2 >60   Glucose 82 - 115 mg/dL 492 (HH)   Calcium 8.8 - 10.0 mg/dL 8.4 (L)   Alkaline Phosphatase 40 - 150 unit/L 78   PROTEIN TOTAL 5.8 - 7.6 gm/dL 6.3   Albumin 3.4 - 4.8 g/dL 3.0 (L)   Albumin/Globulin Ratio 1.1 - 2.0 ratio 0.9 (L)   BILIRUBIN TOTAL <=1.5 mg/dL 1.0   AST 5 - 34 unit/L 19   ALT 0 - 55 unit/L 13   Globulin, Total 2.4 - 3.5 gm/dL 3.3   (HH): Data is critically high  (L): Data is abnormally low    Diet Order: Diet diabetic  Oral Supplement Order: none  Appetite/Oral Intake: good/% of meals  Factors Affecting Nutritional Intake: none identified  Food/Taoism/Cultural  "Preferences:     Food Allergies: none reported    Skin Integrity: rash  Wound(s):       Comments    Pt intake is good, 100% of breakfast. Discussed food preferences. Marked menus. Will monitor.     Anthropometrics    Height: 5' 9" (175.3 cm)    Last Weight: 85.3 kg (188 lb 0.8 oz) (08/24/23 2300) Weight Method: Bed Scale  BMI (Calculated): 27.8  BMI Classification: overweight (BMI 25-29.9)        Ideal Body Weight (IBW), Male: 160 lb     % Ideal Body Weight, Male (lb): 117.53 %                          Usual Weight Provided By: not applicable    Wt Readings from Last 5 Encounters:   08/24/23 85.3 kg (188 lb 0.8 oz)   07/12/23 94.3 kg (208 lb)   03/09/23 89.5 kg (197 lb 5 oz)   10/19/22 102.1 kg (225 lb)   06/21/22 90.3 kg (199 lb)     Weight Change(s) Since Admission:  Admit Weight: 77.1 kg (170 lb) (08/24/23 1550)      Patient Education    Not applicable.    Monitoring & Evaluation     Dietitian will monitor food and beverage intake, weight, weight change, electrolyte/renal panel, glucose/endocrine profile, and gastrointestinal profile.  Nutrition Risk/Follow-Up: low (follow-up in 5-7 days)  Patients assigned 'low nutrition risk' status do not qualify for a full nutritional assessment but will be monitored and re-evaluated in a 5-7 day time period. Please consult if re-evaluation needed sooner.   "

## 2023-08-25 NOTE — PLAN OF CARE
Condition Code 44 complete. Secondary review determination agrees with observation. Team Physician agrees; Patient downgraded to observation.

## 2023-08-25 NOTE — PROGRESS NOTES
[facility]    Patient Name:  John Mancia Jr.   MRN:  [id number]  Date: 8/25/2023  Diagnosis: Hyperosmolar hyperglycemic state (HHS)    History:     Past Medical History:   Diagnosis Date    Arthritis     Bipolar disorder, unspecified     CHF (congestive heart failure)     Coronary artery disease     Diabetes mellitus     GERD (gastroesophageal reflux disease)     High cholesterol     Hx of eye surgery     Hypertension     ST elevation (STEMI) myocardial infarction of unspecified site     Stroke        Social History     Socioeconomic History    Marital status: Single   Tobacco Use    Smoking status: Former    Smokeless tobacco: Never     Social Determinants of Health     Transportation Needs: No Transportation Needs (3/10/2023)    PRAPARE - Transportation     Lack of Transportation (Medical): No     Lack of Transportation (Non-Medical): No   Housing Stability: Unknown (3/10/2023)    Housing Stability Vital Sign     Unable to Pay for Housing in the Last Year: No       Precautions:     Allergies as of 08/24/2023 - Reviewed 08/24/2023   Allergen Reaction Noted    Latex Rash 10/19/2021       WO Assessment Details/Treatment     New consult obtained.   Upon examination.  No wounds noted.   Fingernails not elongated enough to meet criteria to trim.  Trimmed 3 toenails that were elongated with nippers.  Pt tolerated well.   Poor hygiene noted.   Recommend assistance with hygiene measures while hospitalized.   Pressure prevention measures while hospitalized.  Notified md of findings.        08/25/23 1332   WOCN Assessment   WOCN Total Time (mins) 15   Visit Date 08/25/23   Visit Time 1332   Consult Type New   WOCN Speciality Wound   Continence Type Urinary;Fecal   Intervention assessed;chart review   Teaching on-going   Skin Interventions   Device Skin Pressure Protection positioning supports utilized   Pressure Reduction Devices pressure-redistributing mattress utilized    Pressure Reduction Techniques weight shift assistance provided;heels elevated off bed   Skin Protection adhesive use limited;incontinence pads utilized;tubing/devices free from skin contact   Pressure Injury Prevention    Check Moisture Management Pad Done   Heel protection technique Pillow   Check Medical Devices Done        Altered Skin Integrity 08/24/23 2345 Right lower Buttocks Other (comment)   Date First Assessed/Time First Assessed: 08/24/23 2345   Altered Skin Integrity Present on Admission - Did Patient arrive to the hospital with altered skin?: yes  Side: Right  Orientation: lower  Location: Buttocks  Is this injury device related?: No ...   Wound Image    Appearance Intact;Pink;Epithelialization   Tissue loss description Not applicable           08/25/2023

## 2023-08-25 NOTE — NURSING
Nurses Note -- 4 Eyes      8/25/2023   12:39 AM      Skin assessed during: Admit      [] No Altered Skin Integrity Present    [x]Prevention Measures Documented      [x] Yes- Altered Skin Integrity Present or Discovered   [x] LDA Added if Not in Epic (Describe Wound)   [] New Altered Skin Integrity was Present on Admit and Documented in LDA   [] Wound Image Taken    Wound Care Consulted? Yes    Attending Nurse:  Danni Stewart RN     Second RN/Staff Member:  Juan Ramon Meidna RN

## 2023-08-26 LAB
ANION GAP SERPL CALC-SCNC: 7 MEQ/L
BASOPHILS # BLD AUTO: 0.02 X10(3)/MCL
BASOPHILS NFR BLD AUTO: 0.3 %
BUN SERPL-MCNC: 8.9 MG/DL (ref 8.4–25.7)
CALCIUM SERPL-MCNC: 7.6 MG/DL (ref 8.8–10)
CHLORIDE SERPL-SCNC: 106 MMOL/L (ref 98–107)
CO2 SERPL-SCNC: 24 MMOL/L (ref 23–31)
CREAT SERPL-MCNC: 0.59 MG/DL (ref 0.73–1.18)
CREAT/UREA NIT SERPL: 15
EOSINOPHIL # BLD AUTO: 0.18 X10(3)/MCL (ref 0–0.9)
EOSINOPHIL NFR BLD AUTO: 3.1 %
ERYTHROCYTE [DISTWIDTH] IN BLOOD BY AUTOMATED COUNT: 12.8 % (ref 11.5–17)
GFR SERPLBLD CREATININE-BSD FMLA CKD-EPI: >60 MLS/MIN/1.73/M2
GLUCOSE SERPL-MCNC: 173 MG/DL (ref 82–115)
HCT VFR BLD AUTO: 38.2 % (ref 42–52)
HGB BLD-MCNC: 13.5 G/DL (ref 14–18)
IMM GRANULOCYTES # BLD AUTO: 0.01 X10(3)/MCL (ref 0–0.04)
IMM GRANULOCYTES NFR BLD AUTO: 0.2 %
LYMPHOCYTES # BLD AUTO: 2.39 X10(3)/MCL (ref 0.6–4.6)
LYMPHOCYTES NFR BLD AUTO: 41.1 %
MAGNESIUM SERPL-MCNC: 1.8 MG/DL (ref 1.6–2.6)
MCH RBC QN AUTO: 30.8 PG (ref 27–31)
MCHC RBC AUTO-ENTMCNC: 35.3 G/DL (ref 33–36)
MCV RBC AUTO: 87.2 FL (ref 80–94)
MONOCYTES # BLD AUTO: 0.44 X10(3)/MCL (ref 0.1–1.3)
MONOCYTES NFR BLD AUTO: 7.6 %
NEUTROPHILS # BLD AUTO: 2.78 X10(3)/MCL (ref 2.1–9.2)
NEUTROPHILS NFR BLD AUTO: 47.7 %
PLATELET # BLD AUTO: 105 X10(3)/MCL (ref 130–400)
PMV BLD AUTO: 11.4 FL (ref 7.4–10.4)
POCT GLUCOSE: 172 MG/DL (ref 70–110)
POCT GLUCOSE: 198 MG/DL (ref 70–110)
POTASSIUM SERPL-SCNC: 3 MMOL/L (ref 3.5–5.1)
RBC # BLD AUTO: 4.38 X10(6)/MCL (ref 4.7–6.1)
SODIUM SERPL-SCNC: 137 MMOL/L (ref 136–145)
WBC # SPEC AUTO: 5.82 X10(3)/MCL (ref 4.5–11.5)

## 2023-08-26 PROCEDURE — G0378 HOSPITAL OBSERVATION PER HR: HCPCS

## 2023-08-26 PROCEDURE — 94760 N-INVAS EAR/PLS OXIMETRY 1: CPT

## 2023-08-26 PROCEDURE — 96366 THER/PROPH/DIAG IV INF ADDON: CPT

## 2023-08-26 PROCEDURE — 11000001 HC ACUTE MED/SURG PRIVATE ROOM

## 2023-08-26 PROCEDURE — 96372 THER/PROPH/DIAG INJ SC/IM: CPT | Performed by: STUDENT IN AN ORGANIZED HEALTH CARE EDUCATION/TRAINING PROGRAM

## 2023-08-26 PROCEDURE — 96365 THER/PROPH/DIAG IV INF INIT: CPT

## 2023-08-26 PROCEDURE — 80048 BASIC METABOLIC PNL TOTAL CA: CPT | Performed by: STUDENT IN AN ORGANIZED HEALTH CARE EDUCATION/TRAINING PROGRAM

## 2023-08-26 PROCEDURE — 25000003 PHARM REV CODE 250: Performed by: STUDENT IN AN ORGANIZED HEALTH CARE EDUCATION/TRAINING PROGRAM

## 2023-08-26 PROCEDURE — 85025 COMPLETE CBC W/AUTO DIFF WBC: CPT | Performed by: STUDENT IN AN ORGANIZED HEALTH CARE EDUCATION/TRAINING PROGRAM

## 2023-08-26 PROCEDURE — 96361 HYDRATE IV INFUSION ADD-ON: CPT

## 2023-08-26 PROCEDURE — 27000221 HC OXYGEN, UP TO 24 HOURS

## 2023-08-26 PROCEDURE — 83735 ASSAY OF MAGNESIUM: CPT | Performed by: STUDENT IN AN ORGANIZED HEALTH CARE EDUCATION/TRAINING PROGRAM

## 2023-08-26 PROCEDURE — 63600175 PHARM REV CODE 636 W HCPCS: Performed by: STUDENT IN AN ORGANIZED HEALTH CARE EDUCATION/TRAINING PROGRAM

## 2023-08-26 RX ORDER — MAGNESIUM SULFATE HEPTAHYDRATE 40 MG/ML
2 INJECTION, SOLUTION INTRAVENOUS ONCE
Status: DISCONTINUED | OUTPATIENT
Start: 2023-08-26 | End: 2023-08-28 | Stop reason: HOSPADM

## 2023-08-26 RX ORDER — POTASSIUM CHLORIDE 20 MEQ/1
40 TABLET, EXTENDED RELEASE ORAL 2 TIMES DAILY
Status: COMPLETED | OUTPATIENT
Start: 2023-08-26 | End: 2023-08-26

## 2023-08-26 RX ADMIN — INSULIN DETEMIR 30 UNITS: 100 INJECTION, SOLUTION SUBCUTANEOUS at 08:08

## 2023-08-26 RX ADMIN — ENOXAPARIN SODIUM 90 MG: 100 INJECTION SUBCUTANEOUS at 06:08

## 2023-08-26 RX ADMIN — POTASSIUM CHLORIDE 40 MEQ: 1500 TABLET, EXTENDED RELEASE ORAL at 08:08

## 2023-08-26 RX ADMIN — INSULIN ASPART 2 UNITS: 100 INJECTION, SOLUTION INTRAVENOUS; SUBCUTANEOUS at 08:08

## 2023-08-26 RX ADMIN — POTASSIUM CHLORIDE 40 MEQ: 1500 TABLET, EXTENDED RELEASE ORAL at 09:08

## 2023-08-26 RX ADMIN — INSULIN ASPART 20 UNITS: 100 INJECTION, SOLUTION INTRAVENOUS; SUBCUTANEOUS at 12:08

## 2023-08-26 RX ADMIN — INSULIN ASPART 3 UNITS: 100 INJECTION, SOLUTION INTRAVENOUS; SUBCUTANEOUS at 06:08

## 2023-08-26 RX ADMIN — SODIUM CHLORIDE 1000 ML: 9 INJECTION, SOLUTION INTRAVENOUS at 09:08

## 2023-08-26 RX ADMIN — TRAZODONE HYDROCHLORIDE 25 MG: 50 TABLET ORAL at 08:08

## 2023-08-26 RX ADMIN — ENOXAPARIN SODIUM 90 MG: 100 INJECTION SUBCUTANEOUS at 05:08

## 2023-08-26 RX ADMIN — INSULIN ASPART 20 UNITS: 100 INJECTION, SOLUTION INTRAVENOUS; SUBCUTANEOUS at 09:08

## 2023-08-26 RX ADMIN — INSULIN ASPART 20 UNITS: 100 INJECTION, SOLUTION INTRAVENOUS; SUBCUTANEOUS at 05:08

## 2023-08-26 NOTE — PLAN OF CARE
Problem: Adult Inpatient Plan of Care  Goal: Plan of Care Review  Outcome: Ongoing, Progressing  Goal: Patient-Specific Goal (Individualized)  Outcome: Ongoing, Progressing  Flowsheets (Taken 8/26/2023 0800)  Anxieties, Fears or Concerns: None expressed  Individualized Care Needs: Monitor cbg's, administer insulin as scheduled, maintain IV fluids     Problem: Fall Injury Risk  Goal: Absence of Fall and Fall-Related Injury  Outcome: Ongoing, Progressing     Problem: Diabetes Comorbidity  Goal: Blood Glucose Level Within Targeted Range  Outcome: Ongoing, Progressing     Problem: Mobility Impairment  Goal: Optimal Mobility  Outcome: Ongoing, Progressing     Problem: Electrolyte Imbalance  Goal: Electrolyte Balance  Outcome: Ongoing, Progressing

## 2023-08-26 NOTE — PT/OT/SLP PROGRESS
Name: John Mancia .    : 1950 (72 y.o.)  MRN: [id number]           Patient Unavailable      Patient unable to be seen at this time secondary to: Communicated with nurse Dorothy prior to seeing pt. Pt has newly developed DV in RLE, nursing recommended to hold PT treatment today.

## 2023-08-26 NOTE — PLAN OF CARE
Problem: Adult Inpatient Plan of Care  Goal: Plan of Care Review  Outcome: Ongoing, Progressing  Flowsheets (Taken 8/26/2023 0413)  Plan of Care Reviewed With: patient  Goal: Patient-Specific Goal (Individualized)  Outcome: Ongoing, Progressing  Flowsheets (Taken 8/26/2023 0413)  Individualized Care Needs: IVF, PT, monitor cbg's  Goal: Absence of Hospital-Acquired Illness or Injury  Outcome: Ongoing, Progressing  Intervention: Identify and Manage Fall Risk  Flowsheets (Taken 8/26/2023 0413)  Safety Promotion/Fall Prevention:   assistive device/personal item within reach   bed alarm set   commode/urinal/bedpan at bedside   Fall Risk reviewed with patient/family   gait belt with ambulation   muscle strengthening facilitated   nonskid shoes/socks when out of bed   side rails raised x 3   supervised activity   Supervised toileting - stay within arms reach   instructed to call staff for mobility  Intervention: Prevent Skin Injury  Flowsheets (Taken 8/26/2023 0413)  Body Position: position changed independently  Skin Protection:   incontinence pads utilized   silicone foam dressing in place  Intervention: Prevent and Manage VTE (Venous Thromboembolism) Risk  Flowsheets (Taken 8/26/2023 0413)  Activity Management: Walk with assistive devise and /or staff member - L3  VTE Prevention/Management:   ambulation promoted   bleeding precations maintained  Intervention: Prevent Infection  Flowsheets (Taken 8/26/2023 0413)  Infection Prevention:   cohorting utilized   environmental surveillance performed   hand hygiene promoted   single patient room provided   rest/sleep promoted     Problem: Skin Injury Risk Increased  Goal: Skin Health and Integrity  Outcome: Ongoing, Progressing  Intervention: Optimize Skin Protection  Flowsheets (Taken 8/26/2023 0413)  Pressure Reduction Techniques:   frequent weight shift encouraged   heels elevated off bed   pressure points protected  Skin Protection:   incontinence pads utilized    silicone foam dressing in place  Head of Bed (HOB) Positioning: HOB at 20-30 degrees  Intervention: Promote and Optimize Oral Intake  Flowsheets (Taken 8/26/2023 0413)  Oral Nutrition Promotion:   calorie-dense foods provided   calorie-dense liquids provided   physical activity promoted   safe use of adaptive equipment encouraged     Problem: Fall Injury Risk  Goal: Absence of Fall and Fall-Related Injury  Outcome: Ongoing, Progressing  Intervention: Identify and Manage Contributors  Flowsheets (Taken 8/26/2023 0413)  Self-Care Promotion:   independence encouraged   BADL personal objects within reach   BADL personal routines maintained   safe use of adaptive equipment encouraged  Intervention: Promote Injury-Free Environment  Flowsheets (Taken 8/26/2023 0413)  Safety Promotion/Fall Prevention:   assistive device/personal item within reach   bed alarm set   commode/urinal/bedpan at bedside   Fall Risk reviewed with patient/family   gait belt with ambulation   muscle strengthening facilitated   nonskid shoes/socks when out of bed   side rails raised x 3   supervised activity   Supervised toileting - stay within arms reach   instructed to call staff for mobility     Problem: Diabetes Comorbidity  Goal: Blood Glucose Level Within Targeted Range  Outcome: Ongoing, Progressing  Intervention: Monitor and Manage Glycemia  Flowsheets (Taken 8/26/2023 0413)  Glycemic Management:   blood glucose monitored   supplemental insulin given     Problem: Mobility Impairment  Goal: Optimal Mobility  Outcome: Ongoing, Progressing  Intervention: Optimize Mobility  Flowsheets (Taken 8/26/2023 0413)  Assistive Device Utilized:   gait belt   walker  Activity Management: Walk with assistive devise and /or staff member - L3  Positioning/Transfer Devices:   pillows   in use     Problem: Electrolyte Imbalance  Goal: Electrolyte Balance  Outcome: Ongoing, Progressing  Intervention: Monitor and Manage Electrolyte Imbalance  Flowsheets (Taken  8/26/2023 0413)  Fluid/Electrolyte Management: electrolyte supplement adjusted      Tetracycline Counseling: Patient counseled regarding possible photosensitivity and increased risk for sunburn.  Patient instructed to avoid sunlight, if possible.  When exposed to sunlight, patients should wear protective clothing, sunglasses, and sunscreen.  The patient was instructed to call the office immediately if the following severe adverse effects occur:  hearing changes, easy bruising/bleeding, severe headache, or vision changes.  The patient verbalized understanding of the proper use and possible adverse effects of tetracycline.  All of the patient's questions and concerns were addressed. Patient understands to avoid pregnancy while on therapy due to potential birth defects.

## 2023-08-26 NOTE — PROGRESS NOTES
[facility] - Medical Surgical Unit  [facility] MEDICINE ~ PROGRESS NOTE    CHIEF COMPLAINT   Hospital follow up    HOSPITAL COURSE   72-year-old male with past medical history of insulin-dependent diabetes mellitus, CAD status post CABG, hyperlipidemia, hypertension, bipolar disorder who presents to the ED after his roommate called an ambulance after the patient fell at home.  In the ED patient found be hyperglycemic in the 600s admitted for management of blood glucose.    Today  Patient significantly more interactive today, blood glucose has achieved control.  He has been bradycardic as low as 37 since being here.  His metoprolol was held on admission.  He denies any symptoms.  OBJECTIVE/PHYSICAL EXAM     VITAL SIGNS (MOST RECENT):  Temp: 97.5 °F (36.4 °C) (08/26/23 0746)  Pulse: (!) 41 (08/26/23 0746)  Resp: 16 (08/26/23 0200)  BP: (!) 100/59 (08/26/23 0746)  SpO2: 98 % (08/26/23 0746) VITAL SIGNS (24 HOUR RANGE):  Temp:  [97.5 °F (36.4 °C)-97.7 °F (36.5 °C)] 97.5 °F (36.4 °C)  Pulse:  [40-42] 41  Resp:  [16] 16  SpO2:  [86 %-98 %] 98 %  BP: ()/(55-60) 100/59   GENERAL: In no acute distress, afebrile  HEENT:  PERRLA  CHEST: Clear to auscultation bilaterally  HEART: S1, S2, no appreciable murmur  ABDOMEN: Soft, nontender, BS +  MSK: Warm, no lower extremity edema, no clubbing or cyanosis  NEUROLOGIC: Alert and oriented x4, moving all extremities with good strength   INTEGUMENTARY:  Warm, dry  ASSESSMENT/PLAN   HHS   Insulin-dependent diabetes mellitus   -ABG on room air 7.37/37.8/73  -CBGs are significantly improved, continue with current dose of insulin     CAD status post CABG  Chronic LBBB  Asymptomatic bradycardia  -continue aspirin   -patient takes Repatha and fenofibrate at home  -holding metoprolol in the setting of asymptomatic bradycardia  -monitor for 24 hours, if no improvement in bradycardia will need to consult tele cardiology     Occlusive DVT  -right lower extremity ultrasound from 08/25  "showing occlusive and nonocclusive DVT  -she was started on weight based Lovenox 825, will discuss with Case Management if Eliquis versus Xarelto will be covered for the patient    Electrolyte abnormalities   -replace magnesium and potassium as condition requires    DVT prophylaxis: SCD in the setting of thrombocytopenia  Anticipated discharge and disposition:  Home with home health care  __________________________________________________________________________    LABS/MICRO/MEDS/DIAGNOSTICS       LABS  Recent Labs     08/25/23 0332 08/26/23 0522   * 137   K 4.5 3.0*   CHLORIDE 98 106   CO2 22* 24   BUN 14.7 8.9   CREATININE 0.86 0.59*   GLUCOSE 492* 173*   CALCIUM 8.4* 7.6*   ALKPHOS 78  --    AST 19  --    ALT 13  --    ALBUMIN 3.0*  --      Recent Labs     08/26/23 0522   WBC 5.82   RBC 4.38*   HCT 38.2*   MCV 87.2   *       MICROBIOLOGY  Microbiology Results (last 7 days)       Procedure Component Value Units Date/Time    Blood culture #1 **CANNOT BE ORDERED STAT** [[id number]] Collected: 08/24/23 1654    Order Status: Resulted Specimen: Blood Updated: 08/24/23 1654    Blood culture #2 **CANNOT BE ORDERED STAT** [[id number]] Collected: 08/24/23 1653    Order Status: Resulted Specimen: Blood Updated: 08/24/23 1653               MEDICATIONS   enoxparin  1 mg/kg Subcutaneous Q12H (treatment, non-standard time)    insulin aspart U-100  20 Units Subcutaneous TIDWM    insulin detemir U-100  30 Units Subcutaneous QHS    magnesium sulfate IVPB  2 g Intravenous Once    potassium chloride  40 mEq Oral BID    traZODone  25 mg Oral QHS         INFUSIONS   sodium chloride 0.9% 1,000 mL (08/26/23 0930)          DIAGNOSTIC TESTS  US Lower Extremity Veins Right   Final Result      Occlusive and nonocclusive DVT of the right lower extremity.         Electronically signed by: Alvin Mast MD   Date:    08/25/2023   Time:    16:13           No results found for: "EF"       NUTRITION STATUS  Patient meets ASPEN " criteria for   malnutrition of   per RD assessment as evidenced by:                       A minimum of two characteristics is recommended for diagnosis of either severe or non-severe malnutrition.       Case related differential diagnoses have been reviewed; assessment and plan has been documented. I have personally reviewed the labs and test results that are currently available; I have reviewed the patients medication list. I have reviewed the consulting providers recommendations. I have reviewed or attempted to review medical records based upon their availability.  All of the patient's and/or family's questions have been addressed and answered to the best of my ability.  I will continue to monitor closely and make adjustments to medical management as needed.  This document was created using M*Modal Fluency Direct.  Transcription errors may have been made.  Please contact me if any questions may rise regarding documentation to clarify transcription.        Thairy G Reyes, DO   Internal Medicine  [facility]  [facility] - Medical Surgical Unit

## 2023-08-26 NOTE — NURSING
Called to patient's room by ORLANDO Mazariegos.  She informed me that she reported to do patient care and found the patient asleep with his IV sitting on the sheets.  IV to left AC noted to be on the bed with site bleeding.  Patient denies knowing what happened to the site and is not aware of what happened.  IV intact, pressure bandage applied.  Will reassess site in 20-30 minutes.  No distress noted at this time.     Also communicated with therapy of patients complaint of pain and newly diagnosed DVT.  For safety reasons, patient will not participate in PT today.

## 2023-08-27 LAB
ANION GAP SERPL CALC-SCNC: 6 MEQ/L
BASOPHILS # BLD AUTO: 0.02 X10(3)/MCL
BASOPHILS NFR BLD AUTO: 0.3 %
BUN SERPL-MCNC: 6.4 MG/DL (ref 8.4–25.7)
CALCIUM SERPL-MCNC: 7.5 MG/DL (ref 8.8–10)
CHLORIDE SERPL-SCNC: 109 MMOL/L (ref 98–107)
CO2 SERPL-SCNC: 24 MMOL/L (ref 23–31)
CREAT SERPL-MCNC: 0.61 MG/DL (ref 0.73–1.18)
CREAT/UREA NIT SERPL: 10
EOSINOPHIL # BLD AUTO: 0.07 X10(3)/MCL (ref 0–0.9)
EOSINOPHIL NFR BLD AUTO: 1.2 %
ERYTHROCYTE [DISTWIDTH] IN BLOOD BY AUTOMATED COUNT: 13.2 % (ref 11.5–17)
GFR SERPLBLD CREATININE-BSD FMLA CKD-EPI: >60 MLS/MIN/1.73/M2
GLUCOSE SERPL-MCNC: 137 MG/DL (ref 82–115)
HCT VFR BLD AUTO: 39.7 % (ref 42–52)
HGB BLD-MCNC: 13.5 G/DL (ref 14–18)
IMM GRANULOCYTES # BLD AUTO: 0.01 X10(3)/MCL (ref 0–0.04)
IMM GRANULOCYTES NFR BLD AUTO: 0.2 %
LYMPHOCYTES # BLD AUTO: 2.13 X10(3)/MCL (ref 0.6–4.6)
LYMPHOCYTES NFR BLD AUTO: 36.2 %
MCH RBC QN AUTO: 30 PG (ref 27–31)
MCHC RBC AUTO-ENTMCNC: 34 G/DL (ref 33–36)
MCV RBC AUTO: 88.2 FL (ref 80–94)
MONOCYTES # BLD AUTO: 0.36 X10(3)/MCL (ref 0.1–1.3)
MONOCYTES NFR BLD AUTO: 6.1 %
NEUTROPHILS # BLD AUTO: 3.3 X10(3)/MCL (ref 2.1–9.2)
NEUTROPHILS NFR BLD AUTO: 56 %
PLATELET # BLD AUTO: 117 X10(3)/MCL (ref 130–400)
PMV BLD AUTO: 10.7 FL (ref 7.4–10.4)
POCT GLUCOSE: 142 MG/DL (ref 70–110)
POCT GLUCOSE: 173 MG/DL (ref 70–110)
POCT GLUCOSE: 186 MG/DL (ref 70–110)
POCT GLUCOSE: 188 MG/DL (ref 70–110)
POCT GLUCOSE: 230 MG/DL (ref 70–110)
POCT GLUCOSE: 236 MG/DL (ref 70–110)
POCT GLUCOSE: 255 MG/DL (ref 70–110)
POTASSIUM SERPL-SCNC: 3.7 MMOL/L (ref 3.5–5.1)
RBC # BLD AUTO: 4.5 X10(6)/MCL (ref 4.7–6.1)
SODIUM SERPL-SCNC: 139 MMOL/L (ref 136–145)
WBC # SPEC AUTO: 5.89 X10(3)/MCL (ref 4.5–11.5)

## 2023-08-27 PROCEDURE — 96361 HYDRATE IV INFUSION ADD-ON: CPT

## 2023-08-27 PROCEDURE — 96372 THER/PROPH/DIAG INJ SC/IM: CPT | Performed by: STUDENT IN AN ORGANIZED HEALTH CARE EDUCATION/TRAINING PROGRAM

## 2023-08-27 PROCEDURE — 80048 BASIC METABOLIC PNL TOTAL CA: CPT | Performed by: STUDENT IN AN ORGANIZED HEALTH CARE EDUCATION/TRAINING PROGRAM

## 2023-08-27 PROCEDURE — 11000001 HC ACUTE MED/SURG PRIVATE ROOM

## 2023-08-27 PROCEDURE — 63600175 PHARM REV CODE 636 W HCPCS: Performed by: STUDENT IN AN ORGANIZED HEALTH CARE EDUCATION/TRAINING PROGRAM

## 2023-08-27 PROCEDURE — 85025 COMPLETE CBC W/AUTO DIFF WBC: CPT | Performed by: STUDENT IN AN ORGANIZED HEALTH CARE EDUCATION/TRAINING PROGRAM

## 2023-08-27 PROCEDURE — 94760 N-INVAS EAR/PLS OXIMETRY 1: CPT

## 2023-08-27 PROCEDURE — 25000003 PHARM REV CODE 250: Performed by: STUDENT IN AN ORGANIZED HEALTH CARE EDUCATION/TRAINING PROGRAM

## 2023-08-27 PROCEDURE — G0378 HOSPITAL OBSERVATION PER HR: HCPCS

## 2023-08-27 RX ORDER — INSULIN ASPART 100 [IU]/ML
15 INJECTION, SOLUTION INTRAVENOUS; SUBCUTANEOUS
Status: DISCONTINUED | OUTPATIENT
Start: 2023-08-27 | End: 2023-08-28

## 2023-08-27 RX ADMIN — INSULIN ASPART 15 UNITS: 100 INJECTION, SOLUTION INTRAVENOUS; SUBCUTANEOUS at 04:08

## 2023-08-27 RX ADMIN — INSULIN ASPART 3 UNITS: 100 INJECTION, SOLUTION INTRAVENOUS; SUBCUTANEOUS at 05:08

## 2023-08-27 RX ADMIN — INSULIN DETEMIR 30 UNITS: 100 INJECTION, SOLUTION SUBCUTANEOUS at 08:08

## 2023-08-27 RX ADMIN — TRAZODONE HYDROCHLORIDE 25 MG: 50 TABLET ORAL at 08:08

## 2023-08-27 RX ADMIN — ENOXAPARIN SODIUM 90 MG: 100 INJECTION SUBCUTANEOUS at 06:08

## 2023-08-27 RX ADMIN — INSULIN ASPART 15 UNITS: 100 INJECTION, SOLUTION INTRAVENOUS; SUBCUTANEOUS at 11:08

## 2023-08-27 RX ADMIN — ENOXAPARIN SODIUM 90 MG: 100 INJECTION SUBCUTANEOUS at 05:08

## 2023-08-27 RX ADMIN — SODIUM CHLORIDE 1000 ML: 9 INJECTION, SOLUTION INTRAVENOUS at 03:08

## 2023-08-27 RX ADMIN — INSULIN ASPART 6 UNITS: 100 INJECTION, SOLUTION INTRAVENOUS; SUBCUTANEOUS at 08:08

## 2023-08-27 NOTE — PROGRESS NOTES
[facility] Surgical Unit  [facility] MEDICINE ~ PROGRESS NOTE    CHIEF COMPLAINT   Hospital follow up    HOSPITAL COURSE   72-year-old male with past medical history of insulin-dependent diabetes mellitus, CAD status post CABG, hyperlipidemia, hypertension, bipolar disorder who presents to the ED after his roommate called an ambulance after the patient fell at home.  In the ED patient found be hyperglycemic in the 600s admitted for management of blood glucose.    Today  Patient doing well, has had approximately 48 hours of washout of metoprolol and heart rate is improving.  Blood glucose control has been achieved.  He has right eye conjunctival injection, he reports he has steroid drops at home, will try to get his roommate to bring the min. patient is stable for discharge from medical point of view however he reports he can not go back to his home as he is having a fall out with his roommate.  Unsure what to do in regards to discharge planning for this patient.  OBJECTIVE/PHYSICAL EXAM     VITAL SIGNS (MOST RECENT):  Temp: 98 °F (36.7 °C) (08/27/23 0735)  Pulse: 60 (08/27/23 0748)  Resp: 18 (08/27/23 0748)  BP: (!) 111/56 (08/27/23 0735)  SpO2: (!) 93 % (08/27/23 0748) VITAL SIGNS (24 HOUR RANGE):  Temp:  [97.4 °F (36.3 °C)-98 °F (36.7 °C)] 98 °F (36.7 °C)  Pulse:  [45-60] 60  Resp:  [16-18] 18  SpO2:  [93 %-97 %] 93 %  BP: (100-126)/(54-64) 111/56   GENERAL: In no acute distress, afebrile  HEENT:  PERRLA  CHEST: Clear to auscultation bilaterally  HEART: S1, S2, no appreciable murmur  ABDOMEN: Soft, nontender, BS +  MSK: Warm, no lower extremity edema, no clubbing or cyanosis  NEUROLOGIC: Alert and oriented x4, moving all extremities with good strength   INTEGUMENTARY:  Warm, dry  ASSESSMENT/PLAN   HHS   Insulin-dependent diabetes mellitus   -ABG on room air 7.37/37.8/73  -continue with current dose of insulin     CAD status post CABG  Chronic LBBB  Asymptomatic bradycardia  -continue aspirin    -patient takes Repatha and fenofibrate at home  -holding metoprolol in the setting of asymptomatic bradycardia, HR improving     Occlusive DVT  -right lower extremity ultrasound from 08/25 showing occlusive and nonocclusive DVT  -he was started on weight based Lovenox 8/25, will discuss with Case Management if Eliquis versus Xarelto will be covered for the patient    Electrolyte abnormalities   -replace magnesium and potassium as condition requires    DVT prophylaxis: SCD in the setting of thrombocytopenia  Anticipated discharge and disposition:  Pt does not have a home to go to at this time  __________________________________________________________________________    LABS/MICRO/MEDS/DIAGNOSTICS       LABS  Recent Labs     08/25/23  0332 08/26/23  0522 08/27/23  0744   *   < > 139   K 4.5   < > 3.7   CHLORIDE 98   < > 109*   CO2 22*   < > 24   BUN 14.7   < > 6.4*   CREATININE 0.86   < > 0.61*   GLUCOSE 492*   < > 137*   CALCIUM 8.4*   < > 7.5*   ALKPHOS 78  --   --    AST 19  --   --    ALT 13  --   --    ALBUMIN 3.0*  --   --     < > = values in this interval not displayed.     Recent Labs     08/27/23  0743   WBC 5.89   RBC 4.50*   HCT 39.7*   MCV 88.2   *       MICROBIOLOGY  Microbiology Results (last 7 days)       Procedure Component Value Units Date/Time    Blood culture #1 **CANNOT BE ORDERED STAT** [[id number]]  (Normal) Collected: 08/24/23 1654    Order Status: Completed Specimen: Blood Updated: 08/26/23 1300     CULTURE, BLOOD ([facility]) No Growth At 24 Hours    Blood culture #2 **CANNOT BE ORDERED STAT** [[id number]]  (Normal) Collected: 08/24/23 1653    Order Status: Completed Specimen: Blood Updated: 08/26/23 1300     CULTURE, BLOOD ([facility]) No Growth At 24 Hours               MEDICATIONS   enoxparin  1 mg/kg Subcutaneous Q12H (treatment, non-standard time)    insulin aspart U-100  20 Units Subcutaneous TIDWM    insulin detemir U-100  30 Units Subcutaneous QHS    magnesium sulfate IVPB  2 g  "Intravenous Once    traZODone  25 mg Oral QHS         INFUSIONS   sodium chloride 0.9% 1,000 mL (08/26/23 5944)          DIAGNOSTIC TESTS  US Lower Extremity Veins Right   Final Result      Occlusive and nonocclusive DVT of the right lower extremity.         Electronically signed by: Alvin Mast MD   Date:    08/25/2023   Time:    16:13           No results found for: "EF"       NUTRITION STATUS  Patient meets ASPEN criteria for   malnutrition of   per RD assessment as evidenced by:                       A minimum of two characteristics is recommended for diagnosis of either severe or non-severe malnutrition.       Case related differential diagnoses have been reviewed; assessment and plan has been documented. I have personally reviewed the labs and test results that are currently available; I have reviewed the patients medication list. I have reviewed the consulting providers recommendations. I have reviewed or attempted to review medical records based upon their availability.  All of the patient's and/or family's questions have been addressed and answered to the best of my ability.  I will continue to monitor closely and make adjustments to medical management as needed.  This document was created using M*Modal Fluency Direct.  Transcription errors may have been made.  Please contact me if any questions may rise regarding documentation to clarify transcription.        Thairy G Reyes, DO   Internal Medicine  Department of Hospital Medicine [facility] - [facility] Surgical Unit              "

## 2023-08-27 NOTE — PLAN OF CARE
Problem: Adult Inpatient Plan of Care  Goal: Plan of Care Review  Outcome: Ongoing, Progressing  Flowsheets (Taken 8/27/2023 1740)  Plan of Care Reviewed With: patient  Goal: Patient-Specific Goal (Individualized)  Outcome: Ongoing, Progressing  Flowsheets (Taken 8/27/2023 1740)  Anxieties, Fears or Concerns: concerned about his tv channels  Individualized Care Needs: monitor blood glucose, admin IV fluids & insulin  Goal: Absence of Hospital-Acquired Illness or Injury  Outcome: Ongoing, Progressing  Intervention: Identify and Manage Fall Risk  Flowsheets (Taken 8/27/2023 1740)  Safety Promotion/Fall Prevention:   assistive device/personal item within reach   bed alarm set   medications reviewed   nonskid shoes/socks when out of bed   instructed to call staff for mobility  Intervention: Prevent Skin Injury  Flowsheets (Taken 8/27/2023 1740)  Body Position:   weight shifting   sitting up in bed   turned  Skin Protection:   adhesive use limited   incontinence pads utilized   tubing/devices free from skin contact  Intervention: Prevent and Manage VTE (Venous Thromboembolism) Risk  Flowsheets (Taken 8/27/2023 1740)  Activity Management:   Rolling - L1   Walk with assistive devise and /or staff member - L3  VTE Prevention/Management:   bleeding precations maintained   ambulation promoted   bleeding risk assessed   dorsiflexion/plantar flexion performed  Intervention: Prevent Infection  Flowsheets (Taken 8/27/2023 1740)  Infection Prevention:   cohorting utilized   hand hygiene promoted   single patient room provided  Goal: Optimal Comfort and Wellbeing  Outcome: Ongoing, Progressing  Goal: Readiness for Transition of Care  Outcome: Ongoing, Progressing     Problem: Skin Injury Risk Increased  Goal: Skin Health and Integrity  Outcome: Ongoing, Progressing     Problem: Fall Injury Risk  Goal: Absence of Fall and Fall-Related Injury  Outcome: Ongoing, Progressing     Problem: Diabetes Comorbidity  Goal: Blood Glucose  Level Within Targeted Range  Outcome: Ongoing, Progressing     Problem: Mobility Impairment  Goal: Optimal Mobility  Outcome: Ongoing, Progressing     Problem: Electrolyte Imbalance  Goal: Electrolyte Balance  Outcome: Ongoing, Progressing     Problem: Infection  Goal: Absence of Infection Signs and Symptoms  Outcome: Ongoing, Progressing

## 2023-08-27 NOTE — PLAN OF CARE
Problem: Adult Inpatient Plan of Care  Goal: Plan of Care Review  Outcome: Ongoing, Progressing  Flowsheets (Taken 8/26/2023 1929)  Plan of Care Reviewed With: patient  Goal: Patient-Specific Goal (Individualized)  Outcome: Ongoing, Progressing  Flowsheets (Taken 8/26/2023 1929)  Individualized Care Needs: monitor cbg's, PT, IVF  Goal: Absence of Hospital-Acquired Illness or Injury  Outcome: Ongoing, Progressing  Intervention: Identify and Manage Fall Risk  Flowsheets (Taken 8/26/2023 1929)  Safety Promotion/Fall Prevention:   assistive device/personal item within reach   bed alarm set   commode/urinal/bedpan at bedside   Fall Risk reviewed with patient/family   gait belt with ambulation   muscle strengthening facilitated   nonskid shoes/socks when out of bed   side rails raised x 3   supervised activity   Supervised toileting - stay within arms reach   instructed to call staff for mobility  Intervention: Prevent Skin Injury  Flowsheets (Taken 8/26/2023 1929)  Body Position: position changed independently  Skin Protection:   incontinence pads utilized   silicone foam dressing in place  Intervention: Prevent and Manage VTE (Venous Thromboembolism) Risk  Flowsheets (Taken 8/26/2023 1929)  Activity Management: Walk with assistive devise and /or staff member - L3  VTE Prevention/Management:   ambulation promoted   bleeding precations maintained  Intervention: Prevent Infection  Flowsheets (Taken 8/26/2023 1929)  Infection Prevention:   cohorting utilized   environmental surveillance performed   hand hygiene promoted   rest/sleep promoted   single patient room provided  Goal: Optimal Comfort and Wellbeing  Outcome: Ongoing, Progressing  Intervention: Monitor Pain and Promote Comfort  Flowsheets (Taken 8/26/2023 1929)  Pain Management Interventions:   care clustered   pillow support provided   quiet environment facilitated     Problem: Skin Injury Risk Increased  Goal: Skin Health and Integrity  Outcome: Ongoing,  Progressing  Intervention: Optimize Skin Protection  Flowsheets (Taken 8/26/2023 1929)  Pressure Reduction Techniques:   frequent weight shift encouraged   pressure points protected  Skin Protection:   incontinence pads utilized   silicone foam dressing in place  Head of Bed (HOB) Positioning: HOB at 20-30 degrees  Intervention: Promote and Optimize Oral Intake  Flowsheets (Taken 8/26/2023 1929)  Oral Nutrition Promotion:   calorie-dense foods provided   calorie-dense liquids provided   physical activity promoted   safe use of adaptive equipment encouraged     Problem: Fall Injury Risk  Goal: Absence of Fall and Fall-Related Injury  Outcome: Ongoing, Progressing  Intervention: Identify and Manage Contributors  Flowsheets (Taken 8/26/2023 1929)  Self-Care Promotion:   independence encouraged   BADL personal objects within reach   BADL personal routines maintained  Intervention: Promote Injury-Free Environment  Flowsheets (Taken 8/26/2023 1929)  Safety Promotion/Fall Prevention:   assistive device/personal item within reach   bed alarm set   commode/urinal/bedpan at bedside   Fall Risk reviewed with patient/family   gait belt with ambulation   muscle strengthening facilitated   nonskid shoes/socks when out of bed   side rails raised x 3   supervised activity   Supervised toileting - stay within arms reach   instructed to call staff for mobility     Problem: Diabetes Comorbidity  Goal: Blood Glucose Level Within Targeted Range  Outcome: Ongoing, Progressing  Intervention: Monitor and Manage Glycemia  Flowsheets (Taken 8/26/2023 1929)  Glycemic Management:   blood glucose monitored   supplemental insulin given     Problem: Mobility Impairment  Goal: Optimal Mobility  Outcome: Ongoing, Progressing  Intervention: Optimize Mobility  Flowsheets (Taken 8/26/2023 1929)  Assistive Device Utilized:   gait belt   walker  Activity Management: Walk with assistive devise and /or staff member - L3     Problem: Electrolyte  Imbalance  Goal: Electrolyte Balance  Outcome: Ongoing, Progressing  Intervention: Monitor and Manage Electrolyte Imbalance  Flowsheets (Taken 8/26/2023 1929)  Fluid/Electrolyte Management: fluids provided

## 2023-08-28 VITALS
TEMPERATURE: 98 F | SYSTOLIC BLOOD PRESSURE: 128 MMHG | WEIGHT: 186.75 LBS | HEIGHT: 69 IN | OXYGEN SATURATION: 98 % | BODY MASS INDEX: 27.66 KG/M2 | HEART RATE: 62 BPM | DIASTOLIC BLOOD PRESSURE: 63 MMHG | RESPIRATION RATE: 18 BRPM

## 2023-08-28 LAB
POCT GLUCOSE: 126 MG/DL (ref 70–110)
POCT GLUCOSE: 154 MG/DL (ref 70–110)
POCT GLUCOSE: 176 MG/DL (ref 70–110)
POCT GLUCOSE: 225 MG/DL (ref 70–110)

## 2023-08-28 PROCEDURE — 96372 THER/PROPH/DIAG INJ SC/IM: CPT | Performed by: STUDENT IN AN ORGANIZED HEALTH CARE EDUCATION/TRAINING PROGRAM

## 2023-08-28 PROCEDURE — 25000003 PHARM REV CODE 250: Performed by: STUDENT IN AN ORGANIZED HEALTH CARE EDUCATION/TRAINING PROGRAM

## 2023-08-28 PROCEDURE — 97129 THER IVNTJ 1ST 15 MIN: CPT

## 2023-08-28 PROCEDURE — 97530 THERAPEUTIC ACTIVITIES: CPT | Mod: CQ

## 2023-08-28 PROCEDURE — 96361 HYDRATE IV INFUSION ADD-ON: CPT

## 2023-08-28 PROCEDURE — 63600175 PHARM REV CODE 636 W HCPCS: Performed by: STUDENT IN AN ORGANIZED HEALTH CARE EDUCATION/TRAINING PROGRAM

## 2023-08-28 PROCEDURE — G0378 HOSPITAL OBSERVATION PER HR: HCPCS

## 2023-08-28 PROCEDURE — 97116 GAIT TRAINING THERAPY: CPT | Mod: CQ

## 2023-08-28 RX ORDER — INSULIN ASPART 100 [IU]/ML
17 INJECTION, SOLUTION INTRAVENOUS; SUBCUTANEOUS 3 TIMES DAILY
Qty: 15.3 ML | Refills: 0 | Status: SHIPPED | OUTPATIENT
Start: 2023-08-28 | End: 2023-12-15 | Stop reason: SDUPTHER

## 2023-08-28 RX ORDER — INSULIN ASPART 100 [IU]/ML
17 INJECTION, SOLUTION INTRAVENOUS; SUBCUTANEOUS
Status: DISCONTINUED | OUTPATIENT
Start: 2023-08-28 | End: 2023-08-28 | Stop reason: HOSPADM

## 2023-08-28 RX ADMIN — ENOXAPARIN SODIUM 90 MG: 100 INJECTION SUBCUTANEOUS at 05:08

## 2023-08-28 RX ADMIN — INSULIN ASPART 15 UNITS: 100 INJECTION, SOLUTION INTRAVENOUS; SUBCUTANEOUS at 09:08

## 2023-08-28 RX ADMIN — SODIUM CHLORIDE 1000 ML: 9 INJECTION, SOLUTION INTRAVENOUS at 01:08

## 2023-08-28 RX ADMIN — INSULIN ASPART 17 UNITS: 100 INJECTION, SOLUTION INTRAVENOUS; SUBCUTANEOUS at 04:08

## 2023-08-28 RX ADMIN — INSULIN ASPART 17 UNITS: 100 INJECTION, SOLUTION INTRAVENOUS; SUBCUTANEOUS at 11:08

## 2023-08-28 NOTE — PT/OT/SLP PROGRESS
"Speech Language Pathology Treatment    Patient Name:  John Mancia Jr.   MRN:  [id number]  Admitting Diagnosis: Hyperosmolar hyperglycemic state (HHS)    Recommendations:                 General Recommendations:  Cognitive-linguistic therapy  Diet recommendations:  Regular Diet - IDDSI Level 7, Liquid Diet Level: Thin liquids - IDDSI Level 0   Aspiration Precautions: HOB to 90 degrees   General Precautions: Standard, fall  Communication strategies:  go to room if call light pushed    Assessment:     John Mancia Jr. is a 72 y.o. male with an SLP diagnosis of Cognitive-Linguistic Impairment.  He presents with impaired SHORT-TERM MEMORY recall, orientation, problem solving, thought organization, and safety awareness on eval. Patient would benefit from skilled SPEECH-LANGUAGE PATHOLOGIST services to ensure safety upon discharge home.    Subjective     Patient in bed watching TV upon SPEECH-LANGUAGE PATHOLOGIST arrival. Patient pleasant and in good spirits.    Patient goals: to go home     Pain/Comfort:       Respiratory Status: Room air    Objective:     Has the patient been evaluated by SLP for swallowing?      Keep patient NPO?     Current Respiratory Status:        Patient oriented x4 Independently.  Patient Independently recalled details from PHYSICAL THERAPY session this AM.  Patient Independently recalled 4 items needed/wanted from house.  Patient participated in conversation regarding discharging planning and barrier w/ getting in touch w/ roommate, Carlyn. Patient demonstrated adequate recall, reasoning and insight.  Improvement noted in cognition today vs on eval. Patient appears to be at baseline.  Patient reports having 2 "mini strokes" in the past and reports he moves more slowly since then.    Overall good session.  Cont slp poc.    Goals:   Multidisciplinary Problems       SLP Goals          Problem: SLP    Goal Priority Disciplines Outcome   SLP Goal     SLP Ongoing, Progressing   Description: LTG: " Patient will improve cognitive linguistic skills to ensure safe discharge home.    STG:  Patient will utilize memory strategies to recall new information following a 3 minute filled delay Digna.  Patient will orient x4 Halie.  Patient will complete thought organization tasks w/ 90% accuracy Digna.                       Plan:     Patient to be seen:  5 x/week   Plan of Care expires:  09/08/23  Plan of Care reviewed with:  patient   SLP Follow-Up:          Discharge recommendations:      Barriers to Discharge:  None    Time Tracking:     SLP Treatment Date:   8/28/23  Speech Start Time:  10:10  Speech Stop Time:  10:30    Speech Total Time (min):  20 min    Billable Minutes: Speech Therapy Individual 20    Cheryl Ferrell M.S., CCC-SLP   08/28/2023

## 2023-08-28 NOTE — PROGRESS NOTES
Patient discharged home @1715 with all belongings, including new med eliquis, via taxi. AVS printed and given to patient. Discharge instructions including follow up appointments and medications reviewed with patient and family, all questions answered. New prescriptions sent to [facility] in [place]. New PCP appointment scheduled. IV and telemetry d/c'd prior to discharge. VS stable, patient on room air and in no distress at discharge.    Opioid Pregnancy And Lactation Text: These medications can lead to premature delivery and should be avoided during pregnancy. These medications are also present in breast milk in small amounts.

## 2023-08-28 NOTE — PLAN OF CARE
Problem: Adult Inpatient Plan of Care  Goal: Plan of Care Review  Outcome: Ongoing, Progressing  Flowsheets (Taken 8/28/2023 0110)  Plan of Care Reviewed With: patient  Goal: Patient-Specific Goal (Individualized)  Outcome: Ongoing, Progressing  Flowsheets (Taken 8/28/2023 0110)  Individualized Care Needs: IVF, PT, monitor cbg's  Goal: Absence of Hospital-Acquired Illness or Injury  Outcome: Ongoing, Progressing  Intervention: Identify and Manage Fall Risk  Flowsheets (Taken 8/28/2023 0110)  Safety Promotion/Fall Prevention:   assistive device/personal item within reach   bed alarm set   commode/urinal/bedpan at bedside   Fall Risk reviewed with patient/family   gait belt with ambulation   muscle strengthening facilitated   nonskid shoes/socks when out of bed   side rails raised x 3   supervised activity   Supervised toileting - stay within arms reach   instructed to call staff for mobility  Intervention: Prevent Skin Injury  Flowsheets (Taken 8/28/2023 0110)  Body Position: position changed independently  Skin Protection: silicone foam dressing in place  Intervention: Prevent and Manage VTE (Venous Thromboembolism) Risk  Flowsheets (Taken 8/28/2023 0110)  Activity Management: Walk with assistive devise and /or staff member - L3  VTE Prevention/Management:   ambulation promoted   bleeding precations maintained  Intervention: Prevent Infection  Flowsheets (Taken 8/28/2023 0110)  Infection Prevention:   cohorting utilized   environmental surveillance performed   hand hygiene promoted   single patient room provided   rest/sleep promoted  Goal: Optimal Comfort and Wellbeing  Outcome: Ongoing, Progressing  Intervention: Monitor Pain and Promote Comfort  Flowsheets (Taken 8/28/2023 0110)  Pain Management Interventions:   care clustered   quiet environment facilitated     Problem: Skin Injury Risk Increased  Goal: Skin Health and Integrity  Outcome: Ongoing, Progressing  Intervention: Optimize Skin  Protection  Flowsheets (Taken 8/28/2023 0110)  Pressure Reduction Techniques:   frequent weight shift encouraged   pressure points protected  Skin Protection: silicone foam dressing in place  Head of Bed (HOB) Positioning: HOB at 20-30 degrees  Intervention: Promote and Optimize Oral Intake  Flowsheets (Taken 8/28/2023 0110)  Oral Nutrition Promotion:   calorie-dense foods provided   calorie-dense liquids provided   physical activity promoted   safe use of adaptive equipment encouraged     Problem: Fall Injury Risk  Goal: Absence of Fall and Fall-Related Injury  Outcome: Ongoing, Progressing  Intervention: Identify and Manage Contributors  Flowsheets (Taken 8/28/2023 0110)  Self-Care Promotion:   independence encouraged   BADL personal objects within reach   BADL personal routines maintained   safe use of adaptive equipment encouraged  Intervention: Promote Injury-Free Environment  Flowsheets (Taken 8/28/2023 0110)  Safety Promotion/Fall Prevention:   assistive device/personal item within reach   bed alarm set   commode/urinal/bedpan at bedside   Fall Risk reviewed with patient/family   gait belt with ambulation   muscle strengthening facilitated   nonskid shoes/socks when out of bed   side rails raised x 3   supervised activity   Supervised toileting - stay within arms reach   instructed to call staff for mobility     Problem: Diabetes Comorbidity  Goal: Blood Glucose Level Within Targeted Range  Outcome: Ongoing, Progressing  Intervention: Monitor and Manage Glycemia  Flowsheets (Taken 8/28/2023 0110)  Glycemic Management:   blood glucose monitored   supplemental insulin given     Problem: Mobility Impairment  Goal: Optimal Mobility  Outcome: Ongoing, Progressing  Intervention: Optimize Mobility  Flowsheets (Taken 8/28/2023 0110)  Assistive Device Utilized:   gait belt   walker  Activity Management: Walk with assistive devise and /or staff member - L3

## 2023-08-28 NOTE — PLAN OF CARE
Problem: SLP  Goal: SLP Goal  Description: LTG: Patient will improve cognitive linguistic skills to ensure safe discharge home.    STG:  Patient will utilize memory strategies to recall new information following a 3 minute filled delay Digna. Goal met  Patient will orient x4 Halie. Goal met  Patient will complete thought organization tasks w/ 90% accuracy Digna.  Outcome: Ongoing, Progressing

## 2023-08-28 NOTE — PROGRESS NOTES
[facility]  [facility] MEDICINE ~ PROGRESS NOTE    CHIEF COMPLAINT   Hospital follow up    HOSPITAL COURSE   72-year-old male with past medical history of insulin-dependent diabetes mellitus, CAD status post CABG, hyperlipidemia, hypertension, bipolar disorder who presents to the ED after his roommate called an ambulance after the patient fell at home.  In the ED patient found be hyperglycemic in the 600s admitted for management of blood glucose.    Today  No complaints, back to baseline mental status.  Ambulating 150 ft.  OBJECTIVE/PHYSICAL EXAM     VITAL SIGNS (MOST RECENT):  Temp: 98.3 °F (36.8 °C) (08/28/23 1058)  Pulse: (!) 49 (08/28/23 1058)  Resp: 18 (08/27/23 2000)  BP: 137/69 (08/28/23 1058)  SpO2: 96 % (08/28/23 1058) VITAL SIGNS (24 HOUR RANGE):  Temp:  [97.5 °F (36.4 °C)-98.4 °F (36.9 °C)] 98.3 °F (36.8 °C)  Pulse:  [41-61] 49  Resp:  [18] 18  SpO2:  [93 %-96 %] 96 %  BP: (116-137)/(63-88) 137/69   GENERAL: In no acute distress, afebrile  HEENT:  PERRLA  CHEST: Clear to auscultation bilaterally  HEART: S1, S2, no appreciable murmur  ABDOMEN: Soft, nontender, BS +  MSK: Warm, no lower extremity edema, no clubbing or cyanosis  NEUROLOGIC: Alert and oriented x4, moving all extremities with good strength   INTEGUMENTARY:  Warm, dry  ASSESSMENT/PLAN   HHS   Insulin-dependent diabetes mellitus   -ABG on room air 7.37/37.8/73  -continue with current dose of insulin, discharge with this new dose     CAD status post CABG  Chronic LBBB  Asymptomatic bradycardia  -continue aspirin   -patient takes Repatha and fenofibrate at home  -holding metoprolol in the setting of asymptomatic bradycardia, HR improving     Occlusive DVT  -right lower extremity ultrasound from 08/25 showing occlusive and nonocclusive DVT  -he was started on weight based Lovenox 8/25, discharge with Eliquis     Electrolyte abnormalities   -replace magnesium and potassium as condition requires    DVT prophylaxis: SCD  "in the setting of thrombocytopenia  Anticipated discharge and disposition:  Home with home health care __________________________________________________________________________    LABS/MICRO/MEDS/DIAGNOSTICS       LABS  Recent Labs     08/27/23  0744      K 3.7   CHLORIDE 109*   CO2 24   BUN 6.4*   CREATININE 0.61*   GLUCOSE 137*   CALCIUM 7.5*     Recent Labs     08/27/23  0743   WBC 5.89   RBC 4.50*   HCT 39.7*   MCV 88.2   *       MICROBIOLOGY  Microbiology Results (last 7 days)       Procedure Component Value Units Date/Time    Blood culture #1 **CANNOT BE ORDERED STAT** [[id number]]  (Normal) Collected: 08/24/23 1654    Order Status: Completed Specimen: Blood Updated: 08/27/23 1300     CULTURE, BLOOD ([facility]) No Growth At 48 Hours    Blood culture #2 **CANNOT BE ORDERED STAT** [[id number]]  (Normal) Collected: 08/24/23 1653    Order Status: Completed Specimen: Blood Updated: 08/27/23 1300     CULTURE, BLOOD ([facility]) No Growth At 48 Hours               MEDICATIONS   enoxparin  1 mg/kg Subcutaneous Q12H (treatment, non-standard time)    insulin aspart U-100  17 Units Subcutaneous TIDWM    insulin detemir U-100  30 Units Subcutaneous QHS    magnesium sulfate IVPB  2 g Intravenous Once    traZODone  25 mg Oral QHS         INFUSIONS   sodium chloride 0.9% 1,000 mL (08/28/23 0132)          DIAGNOSTIC TESTS  US Lower Extremity Veins Right   Final Result      Occlusive and nonocclusive DVT of the right lower extremity.         Electronically signed by: Alvin Mast MD   Date:    08/25/2023   Time:    16:13           No results found for: "EF"       NUTRITION STATUS  Patient meets ASPEN criteria for   malnutrition of   per RD assessment as evidenced by:                       A minimum of two characteristics is recommended for diagnosis of either severe or non-severe malnutrition.       Case related differential diagnoses have been reviewed; assessment and plan has been documented. I have personally reviewed the " labs and test results that are currently available; I have reviewed the patients medication list. I have reviewed the consulting providers recommendations. I have reviewed or attempted to review medical records based upon their availability.  All of the patient's and/or family's questions have been addressed and answered to the best of my ability.  I will continue to monitor closely and make adjustments to medical management as needed.  This document was created using M*Modal Fluency Direct.  Transcription errors may have been made.  Please contact me if any questions may rise regarding documentation to clarify transcription.        Thairy G Reyes, DO   Internal Medicine  Department of [facility] Medicine  [facility]

## 2023-08-28 NOTE — PT/OT/SLP PROGRESS
Physical Therapy Treatment Note           Name: John Mancia Jr.    : 1950 (72 y.o.)  MRN: [id number]           TREATMENT SUMMARY AND RECOMMENDATIONS:    PT Received On: 23  PT Start Time: 945     PT Stop Time: 1010  PT Total Time (min): 25 min     Subjective Assessment:  x No complaints  Lethargic   x Awake, alert, cooperative  Uncooperative    Agitated  c/o pain    Appropriate  c/o fatigue    Confused x Treated at bedside     Emotionally labile  Treated in gym/dept.    Impulsive  Other:    Flat affect       Therapy Precautions:    Cognitive deficits  Spinal precautions    Collar - hard  Sternal precautions    Collar - soft   TLSO    Fall risk  LSO    Hip precautions - posterior  Knee immobilizer    Hip precautions - anterior  WBAT    Impaired communication  Partial weightbearing    Oxygen  TTWB    PEG tube  NWB    Visual deficits  Other:    Hearing deficits          Treatment Objectives:     Mobility Training:   Assist level Comments    Bed mobility SBA Supine-sit   Transfer CGA Commode transfer with RW    Gait CGA Amb on firm surface with  ft    Sit to stand transitions     Sitting balance     Standing balance      Wheelchair mobility     Car transfer     Other:          Therapeutic Exercise:   Exercise Sets Reps Comments                               Additional Comments:  No issues noted per pt    Assessment: Patient tolerated session well.    PT Plan: continue  Revisions made to plan of care: No    GOALS:   Multidisciplinary Problems       Physical Therapy Goals          Problem: Physical Therapy    Goal Priority Disciplines Outcome Goal Variances Interventions   Physical Therapy Goal     PT, PT/OT Ongoing, Progressing     Description: Goals to be met by: Discharge     Patient will increase functional independence with mobility by performin. Sit <> stand transfers with use of % of the time without cues, demonstrating proper safety and sequencing at Supervision  2. Bed  <> chair transfer with Supervision using Rolling Walker 100% of the time without cues.   3. Gait  x 50 feet with Supervision using Rolling Walker 100 of the time without cues.   4. Increased functional strength to 4/5 for B LE.                         Skilled PT Minutes Provided: 25   Communication with Treatment Team:     Equipment recommendations:       At end of treatment, patient remained:   Up in chair     Up in wheelchair in room   x In bed   x With alarm activated   x Bed rails up   x Call bell in reach     Family/friends present    Restraints secured properly    In bathroom with CNA/RN notified    Nurse aware    In gym with therapist/tech    Other:

## 2023-08-28 NOTE — DISCHARGE SUMMARY
[facility]  [facility] MEDICINE - DISCHARGE SUMMARY    Patient Name: John Mancia Jr.  MRN: [id number]  Admission Date: 8/24/2023  Discharge Date: 08/28/2023  Hospital Length of Stay: 1 days  Discharge Provider: Thairy G Reyes, MD  Primary Care Provider: Magda Hernandez MD      HOSPITAL COURSE   72-year-old male with past medical history of insulin-dependent diabetes mellitus, CAD status post CABG, hyperlipidemia, hypertension, bipolar disorder who presents to the ED after his roommate called an ambulance after the patient fell at home.  In the ED patient found be hyperglycemic in the 600s admitted for management of blood glucose.  Hyperglycemia has resolved, he was found to have an acute DVT and will be discharged with Eliquis.  Did not tolerate metoprolol that he was taking at home and it was discontinued secondary to asymptomatic bradycardia.  PHYSICAL EXAM     Most Recent Vital Signs:  Temp: 98.3 °F (36.8 °C) (08/28/23 1058)  Pulse: (!) 49 (08/28/23 1058)  Resp: 18 (08/27/23 2000)  BP: 137/69 (08/28/23 1058)  SpO2: 96 % (08/28/23 1058)   GENERAL: In no acute distress, afebrile  HEENT:  PERRLA  CHEST: Clear to auscultation bilaterally  HEART: S1, S2, no appreciable murmur  ABDOMEN: Soft, nontender, BS +  MSK: Warm, no lower extremity edema, no clubbing or cyanosis  NEUROLOGIC: Alert and oriented x4, moving all extremities with good strength   INTEGUMENTARY:  Warm, dry  PSYCHIATRY:  Appropriate affect    DISCHARGE DIAGNOSIS   HHS   Insulin-dependent diabetes mellitus   -ABG on room air 7.37/37.8/73  -continue with current dose of insulin, discharge with this new dose     CAD status post CABG  Chronic LBBB  Asymptomatic bradycardia  -continue aspirin   -patient takes Repatha and fenofibrate at home  -holding metoprolol in the setting of asymptomatic bradycardia, HR improving      Occlusive DVT  -right lower extremity ultrasound from 08/25 showing occlusive and nonocclusive  DVT  -he was started on weight based Lovenox 8/25, discharge with Eliquis      Electrolyte abnormalities   -replace magnesium and potassium as condition requires  _____________________________________________________________________________      DISCHARGE MED REC     Current Discharge Medication List        START taking these medications    Details   !! apixaban (ELIQUIS) 5 mg Tab Take 2 tablets (10 mg total) by mouth 2 (two) times daily. for 7 days  Qty: 28 tablet, Refills: 0      !! apixaban (ELIQUIS) 5 mg Tab Take 1 tablet (5 mg total) by mouth 2 (two) times daily.  Qty: 60 tablet, Refills: 0      insulin aspart U-100 (NOVOLOG) 100 unit/mL injection Inject 17 Units into the skin 3 (three) times daily.  Qty: 15.3 mL, Refills: 0      insulin detemir U-100 (LEVEMIR) 100 unit/mL injection Inject 30 Units into the skin every evening.  Qty: 9 mL, Refills: 0       !! - Potential duplicate medications found. Please discuss with provider.        CONTINUE these medications which have NOT CHANGED    Details   evolocumab (REPATHA PUSHTRONEX) 420 mg/3.5 mL Injt Inject 420 mg into the skin.      fenofibrate micronized (ANTARA) 130 MG capsule Take 130 mg by mouth.      magnesium oxide (MAG-OX) 400 mg (241.3 mg magnesium) tablet Take 1 tablet by mouth.      traZODone (DESYREL) 50 MG tablet Take 25 mg by mouth every evening.           STOP taking these medications       aspirin (ECOTRIN) 81 MG EC tablet Comments:   Reason for Stopping:         coffee xt-phosphatidyl serine 50-50 mg Chew Comments:   Reason for Stopping:         colesevelam (WELCHOL) 625 mg tablet Comments:   Reason for Stopping:         cyanocobalamin 500 MCG tablet Comments:   Reason for Stopping:         insulin glargine 100 units/mL SubQ pen Comments:   Reason for Stopping:         metFORMIN (GLUCOPHAGE) 500 MG tablet Comments:   Reason for Stopping:         miconazole NITRATE 2 % (MICOTIN) 2 % top powder Comments:   Reason for Stopping:         nebivoloL  (BYSTOLIC) 5 MG Tab Comments:   Reason for Stopping:         VICTOZA 3-FREDDY 0.6 mg/0.1 mL (18 mg/3 mL) PnIj pen Comments:   Reason for Stopping:         zinc oxide-cod liver oil (DESITIN) 40 % Pste paste Comments:   Reason for Stopping:                  CONSULTS         FOLLOW UP           DISCHARGE INSTRUCTIONS     Explained in detail to the patient about the discharge plan, medications, and follow-up visits. Pt understands and agrees with the treatment plan.  Discharged Condition: stable  Diet as tolerated  Activities as tolerated  Discharge to: Home-Health Care [facility]    TIME SPENT ON DISCHARGE   35 minutes        Thairy G Reyes, MD  Internal Medicine  Department of Hospital Medicine [facility]      This document was created using electronic dictation services.  Please excuse any errors that may have been made.  Contact me if any questions regarding documentation to clarify verbiage.

## 2023-08-28 NOTE — PLAN OF CARE
Spoke with patient's roommate, Carlyn, who stated that the home that the patient resides in is in both their names. She stated that she does not feel patient is competent to make own decisions. Explained that the physicians have stated that patient is of sound mind and that we cannot force patient to go into a nursing home. She stated that the patient has threatened her. I explained that we cannot keep the patient from his home and that if this is the case she would have to get the police involved. She stated that was not fair and hung up the phone.

## 2023-08-30 LAB
BACTERIA BLD CULT: NORMAL
BACTERIA BLD CULT: NORMAL

## 2023-09-08 NOTE — PT/OT/SLP DISCHARGE
Physical Therapy Discharge Summary    Name: John Mancia Jr.  MRN: [id number]   Principal Problem: Hyperosmolar hyperglycemic state (HHS)     Patient Discharged from acute Physical Therapy on 23.  Please refer to prior PT noted date on 23 for functional status.     Assessment:     Patient appropriate for care in another setting.    Objective:     GOALS:   Multidisciplinary Problems       Physical Therapy Goals          Problem: Physical Therapy    Goal Priority Disciplines Outcome Goal Variances Interventions   Physical Therapy Goal     PT, PT/OT Adequate for Care Transition     Description: Goals to be met by: Discharge     Patient will increase functional independence with mobility by performin. Sit <> stand transfers with use of % of the time without cues, demonstrating proper safety and sequencing at Supervision  2. Bed <> chair transfer with Supervision using Rolling Walker 100% of the time without cues.   3. Gait  x 50 feet with Supervision using Rolling Walker 100 of the time without cues.   4. Increased functional strength to 4/5 for B LE.                         Reasons for Discontinuation of Therapy Services  Transfer to alternate level of care.      Plan:     Patient Discharged to: Home with Home Health Service.      2023

## 2023-09-19 ENCOUNTER — OFFICE VISIT (OUTPATIENT)
Dept: FAMILY MEDICINE | Facility: CLINIC | Age: 73
End: 2023-09-19
Payer: MEDICARE

## 2023-09-19 VITALS
HEART RATE: 72 BPM | WEIGHT: 200.69 LBS | RESPIRATION RATE: 18 BRPM | BODY MASS INDEX: 29.72 KG/M2 | DIASTOLIC BLOOD PRESSURE: 78 MMHG | OXYGEN SATURATION: 97 % | SYSTOLIC BLOOD PRESSURE: 129 MMHG | HEIGHT: 69 IN

## 2023-09-19 DIAGNOSIS — E78.5 HYPERLIPIDEMIA, UNSPECIFIED HYPERLIPIDEMIA TYPE: ICD-10-CM

## 2023-09-19 DIAGNOSIS — Z12.5 ENCOUNTER FOR SCREENING FOR MALIGNANT NEOPLASM OF PROSTATE: ICD-10-CM

## 2023-09-19 DIAGNOSIS — R73.03 PREDIABETES: ICD-10-CM

## 2023-09-19 DIAGNOSIS — R79.9 ABNORMAL FINDING OF BLOOD CHEMISTRY, UNSPECIFIED: ICD-10-CM

## 2023-09-19 DIAGNOSIS — Z76.89 ENCOUNTER TO ESTABLISH CARE: Primary | ICD-10-CM

## 2023-09-19 DIAGNOSIS — E11.9 DIABETES MELLITUS WITHOUT COMPLICATION: ICD-10-CM

## 2023-09-19 DIAGNOSIS — I10 HYPERTENSION, UNSPECIFIED TYPE: ICD-10-CM

## 2023-09-19 PROCEDURE — 1111F PR DISCHARGE MEDS RECONCILED W/ CURRENT OUTPATIENT MED LIST: ICD-10-PCS | Mod: ,,, | Performed by: NURSE PRACTITIONER

## 2023-09-19 PROCEDURE — 3008F PR BODY MASS INDEX (BMI) DOCUMENTED: ICD-10-PCS | Mod: ,,, | Performed by: NURSE PRACTITIONER

## 2023-09-19 PROCEDURE — 3046F PR MOST RECENT HEMOGLOBIN A1C LEVEL > 9.0%: ICD-10-PCS | Mod: ,,, | Performed by: NURSE PRACTITIONER

## 2023-09-19 PROCEDURE — 3046F HEMOGLOBIN A1C LEVEL >9.0%: CPT | Mod: ,,, | Performed by: NURSE PRACTITIONER

## 2023-09-19 PROCEDURE — 1159F MED LIST DOCD IN RCRD: CPT | Mod: ,,, | Performed by: NURSE PRACTITIONER

## 2023-09-19 PROCEDURE — 99204 OFFICE O/P NEW MOD 45 MIN: CPT | Mod: ,,, | Performed by: NURSE PRACTITIONER

## 2023-09-19 PROCEDURE — 3288F FALL RISK ASSESSMENT DOCD: CPT | Mod: ,,, | Performed by: NURSE PRACTITIONER

## 2023-09-19 PROCEDURE — 3078F PR MOST RECENT DIASTOLIC BLOOD PRESSURE < 80 MM HG: ICD-10-PCS | Mod: ,,, | Performed by: NURSE PRACTITIONER

## 2023-09-19 PROCEDURE — 3074F PR MOST RECENT SYSTOLIC BLOOD PRESSURE < 130 MM HG: ICD-10-PCS | Mod: ,,, | Performed by: NURSE PRACTITIONER

## 2023-09-19 PROCEDURE — 3078F DIAST BP <80 MM HG: CPT | Mod: ,,, | Performed by: NURSE PRACTITIONER

## 2023-09-19 PROCEDURE — 1101F PT FALLS ASSESS-DOCD LE1/YR: CPT | Mod: ,,, | Performed by: NURSE PRACTITIONER

## 2023-09-19 PROCEDURE — 3008F BODY MASS INDEX DOCD: CPT | Mod: ,,, | Performed by: NURSE PRACTITIONER

## 2023-09-19 PROCEDURE — 99204 PR OFFICE/OUTPT VISIT, NEW, LEVL IV, 45-59 MIN: ICD-10-PCS | Mod: ,,, | Performed by: NURSE PRACTITIONER

## 2023-09-19 PROCEDURE — 1159F PR MEDICATION LIST DOCUMENTED IN MEDICAL RECORD: ICD-10-PCS | Mod: ,,, | Performed by: NURSE PRACTITIONER

## 2023-09-19 PROCEDURE — 3074F SYST BP LT 130 MM HG: CPT | Mod: ,,, | Performed by: NURSE PRACTITIONER

## 2023-09-19 PROCEDURE — 1126F AMNT PAIN NOTED NONE PRSNT: CPT | Mod: ,,, | Performed by: NURSE PRACTITIONER

## 2023-09-19 PROCEDURE — 1126F PR PAIN SEVERITY QUANTIFIED, NO PAIN PRESENT: ICD-10-PCS | Mod: ,,, | Performed by: NURSE PRACTITIONER

## 2023-09-19 PROCEDURE — 3288F PR FALLS RISK ASSESSMENT DOCUMENTED: ICD-10-PCS | Mod: ,,, | Performed by: NURSE PRACTITIONER

## 2023-09-19 PROCEDURE — 4010F ACE/ARB THERAPY RXD/TAKEN: CPT | Mod: ,,, | Performed by: NURSE PRACTITIONER

## 2023-09-19 PROCEDURE — 1101F PR PT FALLS ASSESS DOC 0-1 FALLS W/OUT INJ PAST YR: ICD-10-PCS | Mod: ,,, | Performed by: NURSE PRACTITIONER

## 2023-09-19 PROCEDURE — 1111F DSCHRG MED/CURRENT MED MERGE: CPT | Mod: ,,, | Performed by: NURSE PRACTITIONER

## 2023-09-19 PROCEDURE — 4010F PR ACE/ARB THEARPY RXD/TAKEN: ICD-10-PCS | Mod: ,,, | Performed by: NURSE PRACTITIONER

## 2023-09-19 NOTE — PROGRESS NOTES
SUBJECTIVE:     History of Present Illness      Chief Complaint: Establish Care (New patient. Est care.)    HPI:  Patient is a 73 y.o. year old male who presents to clinic to establish care as a new patient.  Patient is somewhat of a poor historian.  Patient's last PCP Dr. Hernandez  Pm HTN,  diabetes HLD.  Patient is not sure what medication he is currently taking at this time.  Review of Systems:    Review of Systems    12 point review of systems conducted, negative except as stated in the history of present illness. See HPI for details.     Previous History      Review of patient's allergies indicates:   Allergen Reactions    Latex Rash       Past Medical History:   Diagnosis Date    Arthritis     Bipolar disorder, unspecified     CHF (congestive heart failure)     Coronary artery disease     Diabetes mellitus     GERD (gastroesophageal reflux disease)     High cholesterol     Hx of eye surgery     Hypertension     ST elevation (STEMI) myocardial infarction of unspecified site     Stroke      Current Outpatient Medications   Medication Instructions    apixaban (ELIQUIS) 5 mg, Oral, 2 times daily    fenofibrate micronized (ANTARA) 130 mg, Oral    insulin aspart U-100 (NOVOLOG) 17 Units, Subcutaneous, 3 times daily    insulin detemir U-100 (LEVEMIR) 30 Units, Subcutaneous, Nightly    magnesium oxide (MAG-OX) 400 mg (241.3 mg magnesium) tablet 1 tablet, Oral    REPATHA PUSHTRONEX 420 mg, Subcutaneous    traZODone (DESYREL) 25 mg, Oral, Nightly     Past Surgical History:   Procedure Laterality Date    CORONARY ARTERY BYPASS GRAFT      KNEE SURGERY       History reviewed. No pertinent family history.    Social History     Tobacco Use    Smoking status: Former    Smokeless tobacco: Never   Substance Use Topics    Alcohol use: Yes     Alcohol/week: 1.0 standard drink of alcohol     Types: 1 Cans of beer per week     Comment: socially    Drug use: Never        Health Maintenance      Health Maintenance   Topic Date Due  "   Hepatitis C Screening  Never done    Foot Exam  Never done    Eye Exam  Never done    TETANUS VACCINE  Never done    High Dose Statin  Never done    Colorectal Cancer Screening  Never done    Abdominal Aortic Aneurysm Screening  Never done    Shingles Vaccine (3 of 3) 09/07/2022    Hemoglobin A1c  09/13/2023    Lipid Panel  06/13/2024       OBJECTIVE:     Physical Exam      Vital Signs Reviewed   Visit Vitals  /78   Pulse 72   Resp 18   Ht 5' 9" (1.753 m)   Wt 91 kg (200 lb 11.2 oz)   SpO2 97%   BMI 29.64 kg/m²       Physical Exam    Physical Exam:  General: Alert, well nourished, no acute distress, non-toxic appearing.   Eyes: Anicteric sclera, without conjunctival injection, normal lids, no purulent drainage, EOMs grossly intact.   Ears: No tragal tenderness. Tympanic membranes intact, pearly grey, without effusion or erythema and with a positive light reflex.   Mouth: Posterior pharynx without erythema. No exudate, ulcerations, or lesion. No tonsillar swelling.   Neck: Supple, full ROM, no rigidity, no cervical adenopathy.   Cardio: Normal rate and rhythm    Resp: Respirations even and unlabored, clear to auscultation bilaterally.   Abd: No ecchymosis or distension. Normal bowel sounds in all 4 quadrants. No tenderness to palpation. No rebound tenderness or guarding. No CVA tenderness.   Skin: No rashes or open lesions noted.   MSK: No swelling. No abrasions or signs of trauma. Ambulating without assistance.   Neuro: Alert,oriented No focal deficits noted. Facial expressions even.   Psych: Cooperative, Normal affect      Procedures    Procedures     Labs     Results for orders placed or performed during the hospital encounter of 08/24/23   Blood culture #1 **CANNOT BE ORDERED STAT**    Specimen: Blood   Result Value Ref Range    CULTURE, BLOOD ([facility]) No Growth at 5 days    Blood culture #2 **CANNOT BE ORDERED STAT**    Specimen: Blood   Result Value Ref Range    CULTURE, BLOOD ([facility]) No Growth at 5 days  "   Comprehensive metabolic panel   Result Value Ref Range    Sodium Level 127 (L) 136 - 145 mmol/L    Potassium Level 4.2 3.5 - 5.1 mmol/L    Chloride 90 (L) 98 - 107 mmol/L    Carbon Dioxide 25 23 - 31 mmol/L    Glucose Level 680 (HH) 82 - 115 mg/dL    Blood Urea Nitrogen 15.8 8.4 - 25.7 mg/dL    Creatinine 1.23 (H) 0.73 - 1.18 mg/dL    Calcium Level Total 9.3 8.8 - 10.0 mg/dL    Protein Total 7.4 5.8 - 7.6 gm/dL    Albumin Level 3.5 3.4 - 4.8 g/dL    Globulin 3.9 (H) 2.4 - 3.5 gm/dL    Albumin/Globulin Ratio 0.9 (L) 1.1 - 2.0 ratio    Bilirubin Total 1.1 <=1.5 mg/dL    Alkaline Phosphatase 90 40 - 150 unit/L    Alanine Aminotransferase 17 0 - 55 unit/L    Aspartate Aminotransferase 15 5 - 34 unit/L    eGFR >60 mls/min/1.73/m2   Urinalysis, Reflex to Urine Culture    Specimen: Urine   Result Value Ref Range    Color, UA Yellow Yellow, Light-Yellow, Dark Yellow, Krystle, Straw    Appearance, UA Clear Clear    Specific Gravity, UA <=1.005     pH, UA 5.5 5.0 - 8.5    Protein, UA Negative Negative    Glucose, UA >=1000 (A) Negative, Normal    Ketones, UA 40 (A) Negative    Blood, UA Negative Negative    Bilirubin, UA Negative Negative    Urobilinogen, UA 0.2 0.2, 1.0, Normal    Nitrites, UA Negative Negative    Leukocyte Esterase, UA Negative Negative   Ethanol   Result Value Ref Range    Ethanol Level <10.0 <=10.0 mg/dL   Drug Screen, Urine   Result Value Ref Range    Amphetamines, Urine Negative Negative    Barbituates, Urine Negative Negative    Benzodiazepine, Urine Negative Negative    Cannabinoids, Urine Negative Negative    Cocaine, Urine Negative Negative    Opiates, Urine Negative Negative    Phencyclidine, Urine Negative Negative    pH, Urine 5.5 3.0 - 11.0    Specific Gravity, Urine Auto <1.005 1.001 - 1.035   Brain natriuretic peptide   Result Value Ref Range    Natriuretic Peptide 44.4 <=100.0 pg/mL   CBC with Differential   Result Value Ref Range    WBC 6.78 4.50 - 11.50 x10(3)/mcL    RBC 5.04 4.70 - 6.10  x10(6)/mcL    Hgb 15.0 14.0 - 18.0 g/dL    Hct 43.1 42.0 - 52.0 %    MCV 85.5 80.0 - 94.0 fL    MCH 29.8 27.0 - 31.0 pg    MCHC 34.8 33.0 - 36.0 g/dL    RDW 12.3 11.5 - 17.0 %    Platelet 138 130 - 400 x10(3)/mcL    MPV 10.7 (H) 7.4 - 10.4 fL    Neut % 55.1 %    Lymph % 35.5 %    Mono % 8.8 %    Eos % 0.1 %    Basophil % 0.4 %    Lymph # 2.41 0.6 - 4.6 x10(3)/mcL    Neut # 3.72 2.1 - 9.2 x10(3)/mcL    Mono # 0.60 0.1 - 1.3 x10(3)/mcL    Eos # 0.01 0 - 0.9 x10(3)/mcL    Baso # 0.03 <=0.2 x10(3)/mcL    IG# 0.01 0 - 0.04 x10(3)/mcL    IG% 0.1 %   COVID/FLU A&B PCR   Result Value Ref Range    Influenza A PCR Not Detected Not Detected    Influenza B PCR Not Detected Not Detected    SARS-CoV-2 PCR Not Detected Not Detected, Negative, Invalid   Urinalysis, Microscopic   Result Value Ref Range    Bacteria, UA None Seen None Seen, Rare, Occasional /HPF    RBC, UA None Seen None Seen, 0-2, 3-5, 0-5 /HPF    WBC, UA None Seen None Seen, 0-2, 3-5, 0-5 /HPF    Squamous Epithelial Cells, UA Rare None Seen, Rare, Occasional, Occ /HPF   Comprehensive metabolic panel   Result Value Ref Range    Sodium Level 134 (L) 136 - 145 mmol/L    Potassium Level 4.5 3.5 - 5.1 mmol/L    Chloride 98 98 - 107 mmol/L    Carbon Dioxide 22 (L) 23 - 31 mmol/L    Glucose Level 492 (HH) 82 - 115 mg/dL    Blood Urea Nitrogen 14.7 8.4 - 25.7 mg/dL    Creatinine 0.86 0.73 - 1.18 mg/dL    Calcium Level Total 8.4 (L) 8.8 - 10.0 mg/dL    Protein Total 6.3 5.8 - 7.6 gm/dL    Albumin Level 3.0 (L) 3.4 - 4.8 g/dL    Globulin 3.3 2.4 - 3.5 gm/dL    Albumin/Globulin Ratio 0.9 (L) 1.1 - 2.0 ratio    Bilirubin Total 1.0 <=1.5 mg/dL    Alkaline Phosphatase 78 40 - 150 unit/L    Alanine Aminotransferase 13 0 - 55 unit/L    Aspartate Aminotransferase 19 5 - 34 unit/L    eGFR >60 mls/min/1.73/m2   CBC with Differential   Result Value Ref Range    WBC 9.06 4.50 - 11.50 x10(3)/mcL    RBC 5.04 4.70 - 6.10 x10(6)/mcL    Hgb 15.1 14.0 - 18.0 g/dL    Hct 43.8 42.0 - 52.0 %     MCV 86.9 80.0 - 94.0 fL    MCH 30.0 27.0 - 31.0 pg    MCHC 34.5 33.0 - 36.0 g/dL    RDW 12.4 11.5 - 17.0 %    Platelet 93 (L) 130 - 400 x10(3)/mcL    MPV 12.2 (H) 7.4 - 10.4 fL    Neut % 64.7 %    Lymph % 26.4 %    Mono % 6.4 %    Eos % 2.0 %    Basophil % 0.3 %    Lymph # 2.39 0.6 - 4.6 x10(3)/mcL    Neut # 5.86 2.1 - 9.2 x10(3)/mcL    Mono # 0.58 0.1 - 1.3 x10(3)/mcL    Eos # 0.18 0 - 0.9 x10(3)/mcL    Baso # 0.03 <=0.2 x10(3)/mcL    IG# 0.02 0 - 0.04 x10(3)/mcL    IG% 0.2 %   Basic Metabolic Panel   Result Value Ref Range    Sodium Level 137 136 - 145 mmol/L    Potassium Level 3.0 (L) 3.5 - 5.1 mmol/L    Chloride 106 98 - 107 mmol/L    Carbon Dioxide 24 23 - 31 mmol/L    Glucose Level 173 (H) 82 - 115 mg/dL    Blood Urea Nitrogen 8.9 8.4 - 25.7 mg/dL    Creatinine 0.59 (L) 0.73 - 1.18 mg/dL    BUN/Creatinine Ratio 15     Calcium Level Total 7.6 (L) 8.8 - 10.0 mg/dL    Anion Gap 7.0 mEq/L    eGFR >60 mls/min/1.73/m2   Magnesium   Result Value Ref Range    Magnesium Level 1.80 1.60 - 2.60 mg/dL   CBC with Differential   Result Value Ref Range    WBC 5.82 4.50 - 11.50 x10(3)/mcL    RBC 4.38 (L) 4.70 - 6.10 x10(6)/mcL    Hgb 13.5 (L) 14.0 - 18.0 g/dL    Hct 38.2 (L) 42.0 - 52.0 %    MCV 87.2 80.0 - 94.0 fL    MCH 30.8 27.0 - 31.0 pg    MCHC 35.3 33.0 - 36.0 g/dL    RDW 12.8 11.5 - 17.0 %    Platelet 105 (L) 130 - 400 x10(3)/mcL    MPV 11.4 (H) 7.4 - 10.4 fL    Neut % 47.7 %    Lymph % 41.1 %    Mono % 7.6 %    Eos % 3.1 %    Basophil % 0.3 %    Lymph # 2.39 0.6 - 4.6 x10(3)/mcL    Neut # 2.78 2.1 - 9.2 x10(3)/mcL    Mono # 0.44 0.1 - 1.3 x10(3)/mcL    Eos # 0.18 0 - 0.9 x10(3)/mcL    Baso # 0.02 <=0.2 x10(3)/mcL    IG# 0.01 0 - 0.04 x10(3)/mcL    IG% 0.2 %   Basic Metabolic Panel   Result Value Ref Range    Sodium Level 139 136 - 145 mmol/L    Potassium Level 3.7 3.5 - 5.1 mmol/L    Chloride 109 (H) 98 - 107 mmol/L    Carbon Dioxide 24 23 - 31 mmol/L    Glucose Level 137 (H) 82 - 115 mg/dL    Blood Urea Nitrogen 6.4  (L) 8.4 - 25.7 mg/dL    Creatinine 0.61 (L) 0.73 - 1.18 mg/dL    BUN/Creatinine Ratio 10     Calcium Level Total 7.5 (L) 8.8 - 10.0 mg/dL    Anion Gap 6.0 mEq/L    eGFR >60 mls/min/1.73/m2   CBC with Differential   Result Value Ref Range    WBC 5.89 4.50 - 11.50 x10(3)/mcL    RBC 4.50 (L) 4.70 - 6.10 x10(6)/mcL    Hgb 13.5 (L) 14.0 - 18.0 g/dL    Hct 39.7 (L) 42.0 - 52.0 %    MCV 88.2 80.0 - 94.0 fL    MCH 30.0 27.0 - 31.0 pg    MCHC 34.0 33.0 - 36.0 g/dL    RDW 13.2 11.5 - 17.0 %    Platelet 117 (L) 130 - 400 x10(3)/mcL    MPV 10.7 (H) 7.4 - 10.4 fL    Neut % 56.0 %    Lymph % 36.2 %    Mono % 6.1 %    Eos % 1.2 %    Basophil % 0.3 %    Lymph # 2.13 0.6 - 4.6 x10(3)/mcL    Neut # 3.30 2.1 - 9.2 x10(3)/mcL    Mono # 0.36 0.1 - 1.3 x10(3)/mcL    Eos # 0.07 0 - 0.9 x10(3)/mcL    Baso # 0.02 <=0.2 x10(3)/mcL    IG# 0.01 0 - 0.04 x10(3)/mcL    IG% 0.2 %   POCT glucose   Result Value Ref Range    POCT Glucose >500 (HH) 70 - 110 mg/dL   POCT glucose   Result Value Ref Range    POCT Glucose 375 (H) 70 - 110 mg/dL   POCT glucose   Result Value Ref Range    POCT Glucose 396 (H) 70 - 110 mg/dL   POCT glucose   Result Value Ref Range    POCT Glucose 339 (H) 70 - 110 mg/dL   ISTAT PROCEDURE   Result Value Ref Range    POC PH 7.373 7.35 - 7.45    POC PCO2 37.8 35 - 45 mmHg    POC PO2 73 (L) 80 - 100 mmHg    POC HCO3 22.0 (L) 24 - 28 mmol/L    POC BE -3 -2 to 2 mmol/L    POC SATURATED O2 94 (L) 95 - 100 %    POC TCO2 23 23 - 27 mmol/L    Sample ARTERIAL     FiO2 21    POCT glucose   Result Value Ref Range    POCT Glucose 340 (H) 70 - 110 mg/dL   POCT glucose   Result Value Ref Range    POCT Glucose 291 (H) 70 - 110 mg/dL   POCT glucose   Result Value Ref Range    POCT Glucose 284 (H) 70 - 110 mg/dL   POCT glucose   Result Value Ref Range    POCT Glucose 172 (H) 70 - 110 mg/dL   POCT glucose   Result Value Ref Range    POCT Glucose 198 (H) 70 - 110 mg/dL   POCT glucose   Result Value Ref Range    POCT Glucose 173 (H) 70 - 110  mg/dL   POCT glucose   Result Value Ref Range    POCT Glucose 188 (H) 70 - 110 mg/dL   POCT glucose   Result Value Ref Range    POCT Glucose 186 (H) 70 - 110 mg/dL   POCT glucose   Result Value Ref Range    POCT Glucose 142 (H) 70 - 110 mg/dL   POCT glucose   Result Value Ref Range    POCT Glucose 230 (H) 70 - 110 mg/dL   POCT glucose   Result Value Ref Range    POCT Glucose 236 (H) 70 - 110 mg/dL   POCT glucose   Result Value Ref Range    POCT Glucose 255 (H) 70 - 110 mg/dL   POCT glucose   Result Value Ref Range    POCT Glucose 126 (H) 70 - 110 mg/dL   POCT glucose   Result Value Ref Range    POCT Glucose 225 (H) 70 - 110 mg/dL   POCT glucose   Result Value Ref Range    POCT Glucose 176 (H) 70 - 110 mg/dL   POCT glucose   Result Value Ref Range    POCT Glucose 154 (H) 70 - 110 mg/dL       Chemistry:  Lab Results   Component Value Date     08/27/2023    K 3.7 08/27/2023    CHLORIDE 109 (H) 08/27/2023    BUN 6.4 (L) 08/27/2023    CREATININE 0.61 (L) 08/27/2023    EGFRNORACEVR >60 08/27/2023    GLUCOSE 137 (H) 08/27/2023    CALCIUM 7.5 (L) 08/27/2023    ALKPHOS 78 08/25/2023    LABPROT 6.3 08/25/2023    ALBUMIN 3.0 (L) 08/25/2023    BILIDIR 0.2 05/25/2021    IBILI 0.30 05/25/2021    AST 19 08/25/2023    ALT 13 08/25/2023    MG 1.80 08/26/2023    PHOS 2.4 03/10/2023    WDNBROYZ01IT 21.4 (L) 06/09/2023    TSH 1.317 12/27/2022        Lab Results   Component Value Date    HGBA1C 11.6 (H) 06/09/2023        Hematology:  Lab Results   Component Value Date    WBC 5.89 08/27/2023    HGB 13.5 (L) 08/27/2023    HCT 39.7 (L) 08/27/2023     (L) 08/27/2023       Lipid Panel:  Lab Results   Component Value Date    CHOL 234 (H) 06/09/2023    HDL 35 06/09/2023    LDL 88.00 06/09/2023    TRIG 553 (H) 06/09/2023    TOTALCHOLEST 7 (H) 06/09/2023        Urine:  Lab Results   Component Value Date    COLORUA Yellow 08/24/2023    APPEARANCEUA Clear 08/24/2023    SGUA <=1.005 08/24/2023    PHUA 5.5 08/24/2023    PROTEINUA  Negative 08/24/2023    GLUCOSEUA >=1000 (A) 08/24/2023    KETONESUA 40 (A) 08/24/2023    BLOODUA Negative 08/24/2023    NITRITESUA Negative 08/24/2023    LEUKOCYTESUR Negative 08/24/2023    RBCUA None Seen 08/24/2023    WBCUA None Seen 08/24/2023    BACTERIA None Seen 08/24/2023    CREATRANDUR 200.7 (H) 12/27/2022         Assessment            ICD-10-CM ICD-9-CM   1. Encounter to establish care  Z76.89 V65.8   2. Hypertension, unspecified type  I10 401.9   3. Hyperlipidemia, unspecified hyperlipidemia type  E78.5 272.4   4. Diabetes mellitus without complication  E11.9 250.00   5. Abnormal finding of blood chemistry, unspecified  R79.9 790.6   6. Prediabetes  R73.03 790.29   7. Body mass index (BMI) 30.0-30.9, adult  Z68.30 V85.30   8. Encounter for screening for malignant neoplasm of prostate  Z12.5 V76.44       Plan       1. Encounter to establish care  - CBC Auto Differential; Future  - Comprehensive Metabolic Panel; Future  - Lipid Panel; Future  - TSH; Future  - Hemoglobin A1C; Future  - Urinalysis; Future  - T4, Free; Future  - Vitamin D; Future  - PSA, Screening; Future  - Urinalysis    2. Hypertension, unspecified type    3. Hyperlipidemia, unspecified hyperlipidemia type    4. Diabetes mellitus without complication    5. Abnormal finding of blood chemistry, unspecified  - CBC Auto Differential; Future  - Lipid Panel; Future  - TSH; Future  - Hemoglobin A1C; Future  - T4, Free; Future  - Vitamin D; Future  - PSA, Screening; Future    6. Prediabetes  - TSH; Future  - T4, Free; Future    7. Body mass index (BMI) 30.0-30.9, adult  - Vitamin D; Future    8. Encounter for screening for malignant neoplasm of prostate  - PSA, Screening; Future    Orders Placed This Encounter    CBC Auto Differential    Comprehensive Metabolic Panel    Lipid Panel    TSH    Hemoglobin A1C    Urinalysis    T4, Free    Vitamin D    PSA, Screening      Medication List with Changes/Refills   Current Medications    APIXABAN (ELIQUIS) 5  MG TAB    Take 1 tablet (5 mg total) by mouth 2 (two) times daily.    EVOLOCUMAB (REPATHA PUSHTRONEX) 420 MG/3.5 ML INJT    Inject 420 mg into the skin.    FENOFIBRATE MICRONIZED (ANTARA) 130 MG CAPSULE    Take 130 mg by mouth.    INSULIN ASPART U-100 (NOVOLOG) 100 UNIT/ML INJECTION    Inject 17 Units into the skin 3 (three) times daily.    INSULIN DETEMIR U-100 (LEVEMIR) 100 UNIT/ML INJECTION    Inject 30 Units into the skin every evening.    MAGNESIUM OXIDE (MAG-OX) 400 MG (241.3 MG MAGNESIUM) TABLET    Take 1 tablet by mouth.    TRAZODONE (DESYREL) 50 MG TABLET    Take 25 mg by mouth every evening.       Follow up in about 4 weeks (around 10/17/2023) for Wellness, LABS Prior.   Follow up in about 4 weeks (around 10/17/2023) for Wellness, LABS Prior. In addition to their scheduled follow up, the patient has also been instructed to follow up on as needed basis.   Future Appointments   Date Time Provider Department Center   10/16/2023 10:15 AM Lakisha Sorensen, [facility]

## 2023-10-06 ENCOUNTER — PATIENT OUTREACH (OUTPATIENT)
Dept: ADMINISTRATIVE | Facility: HOSPITAL | Age: 73
End: 2023-10-06
Payer: MEDICARE

## 2023-10-06 NOTE — LETTER
AUTHORIZATION FOR RELEASE OF   CONFIDENTIAL INFORMATION    Dear [facility],    We are seeing John Mancia , date of birth 1950, in the clinic at [facility]. Lakisha Sorensen FNP is the patient's PCP. John VEGA Gavino Gonzalez has an outstanding lab/procedure at the time we reviewed his chart. In order to help keep his health information updated, he has authorized us to request the following medical record(s):        (  )  MAMMOGRAM                                      (  )  COLONOSCOPY      (  )  PAP SMEAR                                          (  )  OUTSIDE LAB RESULTS     (  )  DEXA SCAN                                          ( X ) DIABETIC EYE EXAM            (  )  FOOT EXAM                                          (  )  ENTIRE RECORD     (  )  OUTSIDE IMMUNIZATIONS                 (  )  _______________         Please fax records to [facility], Rami, Cheramie W, FNP, [phone]       If you have any questions, please contact Marylin at [phone]            Patient Name: John Mancia   : 1950  Patient Phone #: [phone]

## 2023-10-06 NOTE — PROGRESS NOTES
The following record(s)  below were uploaded for Health Maintenance .    COLONOSCOPY     07/21/2015    Record request sent to [facility]- Per [facility], patient did not show up for appointment on 03/01/23.     Population Health Outreach.

## 2023-10-06 NOTE — LETTER
AUTHORIZATION FOR RELEASE OF   CONFIDENTIAL INFORMATION    Dear [facility],    We are seeing John Mancia , date of birth 1950, in the clinic at [facility]. Lakisha Sorensen FNP is the patient's PCP. John VEGA Gavino Gonzalez has an outstanding lab/procedure at the time we reviewed his chart. In order to help keep his health information updated, he has authorized us to request the following medical record(s):        (  )  MAMMOGRAM                                      (  )  COLONOSCOPY      (  )  PAP SMEAR                                          (  )  OUTSIDE LAB RESULTS     (  )  DEXA SCAN                                          ( X )DIABETIC  EYE EXAM            (  )  FOOT EXAM                                          (  )  ENTIRE RECORD     (  )  OUTSIDE IMMUNIZATIONS                 (  )  _______________         Please fax records to [facility], Rami, Cheramie W, FNP, [phone]       If you have any questions, please contact Marylin at [phone]            Patient Name: John Mancia   : 1950  Patient Phone #: [phone]

## 2023-10-07 ENCOUNTER — HOSPITAL ENCOUNTER (EMERGENCY)
Facility: HOSPITAL | Age: 73
Discharge: HOME | End: 2023-10-07
Attending: STUDENT IN AN ORGANIZED HEALTH CARE EDUCATION/TRAINING PROGRAM
Payer: MEDICARE

## 2023-10-07 VITALS
RESPIRATION RATE: 18 BRPM | HEART RATE: 56 BPM | SYSTOLIC BLOOD PRESSURE: 138 MMHG | DIASTOLIC BLOOD PRESSURE: 74 MMHG | WEIGHT: 198 LBS | BODY MASS INDEX: 29.33 KG/M2 | OXYGEN SATURATION: 97 % | HEIGHT: 69 IN | TEMPERATURE: 98 F

## 2023-10-07 DIAGNOSIS — R60.0 LOWER EXTREMITY EDEMA: Primary | ICD-10-CM

## 2023-10-07 LAB — POCT GLUCOSE: 289 MG/DL (ref 70–110)

## 2023-10-07 PROCEDURE — 25000003 PHARM REV CODE 250: Performed by: STUDENT IN AN ORGANIZED HEALTH CARE EDUCATION/TRAINING PROGRAM

## 2023-10-07 PROCEDURE — 99284 EMERGENCY DEPT VISIT MOD MDM: CPT

## 2023-10-07 PROCEDURE — 82962 GLUCOSE BLOOD TEST: CPT

## 2023-10-07 RX ORDER — FUROSEMIDE 40 MG/1
40 TABLET ORAL DAILY
Qty: 5 TABLET | Refills: 0 | Status: SHIPPED | OUTPATIENT
Start: 2023-10-07 | End: 2023-11-03

## 2023-10-07 RX ORDER — FUROSEMIDE 40 MG/1
40 TABLET ORAL
Status: COMPLETED | OUTPATIENT
Start: 2023-10-07 | End: 2023-10-07

## 2023-10-07 RX ORDER — FUROSEMIDE 20 MG/1
40 TABLET ORAL DAILY
Qty: 5 TABLET | Refills: 0 | Status: SHIPPED | OUTPATIENT
Start: 2023-10-07 | End: 2023-10-07 | Stop reason: SDUPTHER

## 2023-10-07 RX ADMIN — FUROSEMIDE 40 MG: 40 TABLET ORAL at 02:10

## 2023-10-08 NOTE — ED PROVIDER NOTES
Encounter Date: 10/7/2023       History     Chief Complaint   Patient presents with    Rash     States he has burning under his arms. No redness noted.       Leg Swelling     States he had blisters to left leg and rash, states its getting better but wants md to eval.  States he was told one of his leg had a blood clot, not sure which one.        73 male States he had blisters to left leg and rash, states its getting better but wants md to eval.  States he was told one of his leg had a blood clot in there past not sure which one. Has been out of PO fluid pill, he thinks is lasix, for about a week or longer.  Also c/o burning to skin in armpits occasionally when laying on each side at night. Has experienced this before when switched Deoderant, started using a diff kind 2 weeks ago.      Review of patient's allergies indicates:   Allergen Reactions    Latex Rash     Past Medical History:   Diagnosis Date    Arthritis     Bipolar disorder, unspecified     CHF (congestive heart failure)     Coronary artery disease     Diabetes mellitus     GERD (gastroesophageal reflux disease)     High cholesterol     Hx of eye surgery     Hypertension     ST elevation (STEMI) myocardial infarction of unspecified site     Stroke      Past Surgical History:   Procedure Laterality Date    COLONOSCOPY  07/21/2015    SIMON Lima/ Repeat in 10 years    CORONARY ARTERY BYPASS GRAFT      KNEE SURGERY       No family history on file.  Social History     Tobacco Use    Smoking status: Former    Smokeless tobacco: Never   Substance Use Topics    Alcohol use: Yes     Alcohol/week: 1.0 standard drink of alcohol     Types: 1 Cans of beer per week     Comment: socially    Drug use: Never     Review of Systems   Constitutional:  Negative for fever.   HENT:  Negative for sore throat.    Respiratory:  Negative for shortness of breath.    Cardiovascular:  Positive for leg swelling. Negative for chest pain.   Gastrointestinal:  Negative for  nausea.   Genitourinary:  Negative for dysuria.   Musculoskeletal:  Negative for back pain.   Skin:  Positive for rash.   Neurological:  Negative for weakness.   Hematological:  Does not bruise/bleed easily.       Physical Exam     Initial Vitals [10/07/23 1222]   BP Pulse Resp Temp SpO2   132/64 (!) 56 18 97.8 °F (36.6 °C) 97 %      MAP       --         Physical Exam    Nursing note and vitals reviewed.  Constitutional: He appears well-developed and well-nourished.   HENT:   Head: Normocephalic.   Eyes: EOM are normal. Pupils are equal, round, and reactive to light.   Neck:   Normal range of motion.  Cardiovascular:  Normal rate, regular rhythm and normal pulses.           2+pitting edema to RLE, 1-2+on LLE   Pulmonary/Chest: Breath sounds normal. No respiratory distress.   Abdominal: Abdomen is soft. Bowel sounds are normal. There is no abdominal tenderness.   Musculoskeletal:         General: Normal range of motion.      Cervical back: Normal range of motion.     Neurological: He is alert.   Skin: Skin is warm. Capillary refill takes less than 2 seconds. No rash noted. No erythema.   Dry skin to BLE   Psychiatric: He has a normal mood and affect.         ED Course   Procedures  Labs Reviewed   POCT GLUCOSE - Abnormal; Notable for the following components:       Result Value    POCT Glucose 289 (*)     All other components within normal limits          Imaging Results    None          Medications   furosemide tablet 40 mg (40 mg Oral Given 10/7/23 1417)     Medical Decision Making  Lower extremity edema, HTN, chronic DVT, dependent edema, contact dermitis, medication noncompliance    Amount and/or Complexity of Data Reviewed  Labs: ordered. Decision-making details documented in ED Course.  Discussion of management or test interpretation with external provider(s): Dependent edema on exam RLE>LLE, given po lasix in ER, as well as 5 days 40mg lasix  No rash or skin irritation noted  to axilla bilat, advised to swtich  back to previous deoderant   FU PCP/ER precautions  The patient is resting comfortably in no acute distress.  He is hemodynamically stable and is without objective evidence for acute process requiring urgent intervention or hospitalization. I provided counseling to patient with regard to condition, the treatment plan, specific conditions for return, and the importance of follow up. Detailed written and verbal instructions provided to patient and he expressed a verbal understanding of the discharge instructions and overall management plan. Reiterated the importance of medication administration and safety and advised patient to follow up with primary care provider in 3-5 days or sooner if needed.  Answered questions at this time. The patient is stable for discharge.       Risk  Prescription drug management.                               Clinical Impression:   Final diagnoses:  [R60.0] Lower extremity edema (Primary)        ED Disposition Condition    Discharge Stable          ED Prescriptions       Medication Sig Dispense Start Date End Date Auth. Provider    furosemide (LASIX) 20 MG tablet  (Status: Discontinued) Take 2 tablets (40 mg total) by mouth once daily. for 5 days 5 tablet 10/7/2023 10/7/2023 Cristy Norman MD    furosemide (LASIX) 40 MG tablet Take 1 tablet (40 mg total) by mouth once daily. for 5 days 5 tablet 10/7/2023 10/12/2023 Cristy Norman MD          Follow-up Information       Follow up With Specialties Details Why Contact Info    Lakisha Sorensen, P Family Medicine Schedule an appointment as soon as possible for a visit on 10/9/2023  [address] [facility] [address]  [phone]               Cristy Norman MD  10/08/23 2691

## 2023-10-30 ENCOUNTER — LAB VISIT (OUTPATIENT)
Dept: LAB | Facility: HOSPITAL | Age: 73
End: 2023-10-30
Attending: NURSE PRACTITIONER
Payer: MEDICARE

## 2023-10-30 DIAGNOSIS — Z12.5 ENCOUNTER FOR SCREENING FOR MALIGNANT NEOPLASM OF PROSTATE: ICD-10-CM

## 2023-10-30 DIAGNOSIS — Z00.00 WELLNESS EXAMINATION: Primary | ICD-10-CM

## 2023-10-30 DIAGNOSIS — I10 HYPERTENSION, UNSPECIFIED TYPE: ICD-10-CM

## 2023-10-30 DIAGNOSIS — E11.9 DIABETES MELLITUS WITHOUT COMPLICATION: ICD-10-CM

## 2023-10-30 DIAGNOSIS — E78.5 HYPERLIPIDEMIA, UNSPECIFIED HYPERLIPIDEMIA TYPE: ICD-10-CM

## 2023-10-30 DIAGNOSIS — Z00.00 WELLNESS EXAMINATION: ICD-10-CM

## 2023-10-30 LAB
ALBUMIN SERPL-MCNC: 3.4 G/DL (ref 3.4–4.8)
ALBUMIN/GLOB SERPL: 0.8 RATIO (ref 1.1–2)
ALP SERPL-CCNC: 70 UNIT/L (ref 40–150)
ALT SERPL-CCNC: 14 UNIT/L (ref 0–55)
APPEARANCE UR: CLEAR
AST SERPL-CCNC: 18 UNIT/L (ref 5–34)
BACTERIA #/AREA URNS AUTO: NORMAL /HPF
BASOPHILS # BLD AUTO: 0.02 X10(3)/MCL
BASOPHILS NFR BLD AUTO: 0.3 %
BILIRUB SERPL-MCNC: 0.6 MG/DL
BILIRUB UR QL STRIP.AUTO: NEGATIVE
BUN SERPL-MCNC: 10.9 MG/DL (ref 8.4–25.7)
CALCIUM SERPL-MCNC: 9.5 MG/DL (ref 8.8–10)
CHLORIDE SERPL-SCNC: 101 MMOL/L (ref 98–107)
CHOLEST SERPL-MCNC: 227 MG/DL
CHOLEST/HDLC SERPL: 8 {RATIO} (ref 0–5)
CO2 SERPL-SCNC: 26 MMOL/L (ref 23–31)
COLOR UR AUTO: YELLOW
CREAT SERPL-MCNC: 1.04 MG/DL (ref 0.73–1.18)
DEPRECATED CALCIDIOL+CALCIFEROL SERPL-MC: 19.9 NG/ML (ref 30–80)
EOSINOPHIL # BLD AUTO: 0.19 X10(3)/MCL (ref 0–0.9)
EOSINOPHIL NFR BLD AUTO: 3.1 %
ERYTHROCYTE [DISTWIDTH] IN BLOOD BY AUTOMATED COUNT: 12.6 % (ref 11.5–17)
EST. AVERAGE GLUCOSE BLD GHB EST-MCNC: 274.7 MG/DL
GFR SERPLBLD CREATININE-BSD FMLA CKD-EPI: >60 MLS/MIN/1.73/M2
GLOBULIN SER-MCNC: 4.1 GM/DL (ref 2.4–3.5)
GLUCOSE SERPL-MCNC: 416 MG/DL (ref 82–115)
GLUCOSE UR QL STRIP.AUTO: 500
HBA1C MFR BLD: 11.2 %
HCT VFR BLD AUTO: 45.1 % (ref 42–52)
HDLC SERPL-MCNC: 29 MG/DL (ref 35–60)
HGB BLD-MCNC: 15.6 G/DL (ref 14–18)
IMM GRANULOCYTES # BLD AUTO: 0.01 X10(3)/MCL (ref 0–0.04)
IMM GRANULOCYTES NFR BLD AUTO: 0.2 %
KETONES UR QL STRIP.AUTO: NEGATIVE
LDLC SERPL CALC-MCNC: 84 MG/DL (ref 50–140)
LEUKOCYTE ESTERASE UR QL STRIP.AUTO: NEGATIVE
LYMPHOCYTES # BLD AUTO: 2.68 X10(3)/MCL (ref 0.6–4.6)
LYMPHOCYTES NFR BLD AUTO: 44.4 %
MCH RBC QN AUTO: 29.8 PG (ref 27–31)
MCHC RBC AUTO-ENTMCNC: 34.6 G/DL (ref 33–36)
MCV RBC AUTO: 86.2 FL (ref 80–94)
MONOCYTES # BLD AUTO: 0.47 X10(3)/MCL (ref 0.1–1.3)
MONOCYTES NFR BLD AUTO: 7.8 %
NEUTROPHILS # BLD AUTO: 2.67 X10(3)/MCL (ref 2.1–9.2)
NEUTROPHILS NFR BLD AUTO: 44.2 %
NITRITE UR QL STRIP.AUTO: NEGATIVE
PH UR STRIP.AUTO: 5 [PH]
PLATELET # BLD AUTO: 183 X10(3)/MCL (ref 130–400)
PMV BLD AUTO: 10.4 FL (ref 7.4–10.4)
POTASSIUM SERPL-SCNC: 4.1 MMOL/L (ref 3.5–5.1)
PROT SERPL-MCNC: 7.5 GM/DL (ref 5.8–7.6)
PROT UR QL STRIP.AUTO: 30
PSA SERPL-MCNC: 0.1 NG/ML
RBC # BLD AUTO: 5.23 X10(6)/MCL (ref 4.7–6.1)
RBC #/AREA URNS AUTO: NORMAL /HPF
RBC UR QL AUTO: NEGATIVE
SODIUM SERPL-SCNC: 139 MMOL/L (ref 136–145)
SP GR UR STRIP.AUTO: 1.02 (ref 1–1.03)
SQUAMOUS #/AREA URNS AUTO: NORMAL /HPF
T4 FREE SERPL-MCNC: 0.96 NG/DL (ref 0.7–1.48)
TRIGL SERPL-MCNC: 571 MG/DL (ref 34–140)
TSH SERPL-ACNC: 1.46 UIU/ML (ref 0.35–4.94)
UROBILINOGEN UR STRIP-ACNC: 0.2
VLDLC SERPL CALC-MCNC: 114 MG/DL
WBC # SPEC AUTO: 6.04 X10(3)/MCL (ref 4.5–11.5)
WBC #/AREA URNS AUTO: NORMAL /HPF

## 2023-10-30 PROCEDURE — 84439 ASSAY OF FREE THYROXINE: CPT

## 2023-10-30 PROCEDURE — 36415 COLL VENOUS BLD VENIPUNCTURE: CPT

## 2023-10-30 PROCEDURE — 82306 VITAMIN D 25 HYDROXY: CPT

## 2023-10-30 PROCEDURE — 83036 HEMOGLOBIN GLYCOSYLATED A1C: CPT

## 2023-10-30 PROCEDURE — 80061 LIPID PANEL: CPT

## 2023-10-30 PROCEDURE — 85025 COMPLETE CBC W/AUTO DIFF WBC: CPT

## 2023-10-30 PROCEDURE — 84443 ASSAY THYROID STIM HORMONE: CPT

## 2023-10-30 PROCEDURE — 81001 URINALYSIS AUTO W/SCOPE: CPT

## 2023-10-30 PROCEDURE — 84153 ASSAY OF PSA TOTAL: CPT

## 2023-10-30 PROCEDURE — 80053 COMPREHEN METABOLIC PANEL: CPT

## 2023-11-03 ENCOUNTER — OFFICE VISIT (OUTPATIENT)
Dept: FAMILY MEDICINE | Facility: CLINIC | Age: 73
End: 2023-11-03
Payer: MEDICARE

## 2023-11-03 VITALS
SYSTOLIC BLOOD PRESSURE: 114 MMHG | WEIGHT: 195.63 LBS | BODY MASS INDEX: 30.71 KG/M2 | HEIGHT: 67 IN | RESPIRATION RATE: 16 BRPM | DIASTOLIC BLOOD PRESSURE: 73 MMHG | HEART RATE: 67 BPM | TEMPERATURE: 97 F | OXYGEN SATURATION: 99 %

## 2023-11-03 DIAGNOSIS — I10 HYPERTENSION, UNSPECIFIED TYPE: ICD-10-CM

## 2023-11-03 DIAGNOSIS — Z91.199 NONCOMPLIANCE WITH DIET AND MEDICATION REGIMEN: ICD-10-CM

## 2023-11-03 DIAGNOSIS — Z00.00 WELLNESS EXAMINATION: Primary | ICD-10-CM

## 2023-11-03 DIAGNOSIS — E11.65 TYPE 2 DIABETES MELLITUS WITH HYPERGLYCEMIA, WITH LONG-TERM CURRENT USE OF INSULIN: ICD-10-CM

## 2023-11-03 DIAGNOSIS — Z91.148 NONCOMPLIANCE WITH DIET AND MEDICATION REGIMEN: ICD-10-CM

## 2023-11-03 DIAGNOSIS — Z79.4 TYPE 2 DIABETES MELLITUS WITH HYPERGLYCEMIA, WITH LONG-TERM CURRENT USE OF INSULIN: ICD-10-CM

## 2023-11-03 PROCEDURE — 3074F PR MOST RECENT SYSTOLIC BLOOD PRESSURE < 130 MM HG: ICD-10-PCS | Mod: ,,, | Performed by: NURSE PRACTITIONER

## 2023-11-03 PROCEDURE — 1159F MED LIST DOCD IN RCRD: CPT | Mod: ,,, | Performed by: NURSE PRACTITIONER

## 2023-11-03 PROCEDURE — G0439 PR MEDICARE ANNUAL WELLNESS SUBSEQUENT VISIT: ICD-10-PCS | Mod: ,,, | Performed by: NURSE PRACTITIONER

## 2023-11-03 PROCEDURE — G0439 PPPS, SUBSEQ VISIT: HCPCS | Mod: ,,, | Performed by: NURSE PRACTITIONER

## 2023-11-03 PROCEDURE — 1126F AMNT PAIN NOTED NONE PRSNT: CPT | Mod: ,,, | Performed by: NURSE PRACTITIONER

## 2023-11-03 PROCEDURE — 3078F DIAST BP <80 MM HG: CPT | Mod: ,,, | Performed by: NURSE PRACTITIONER

## 2023-11-03 PROCEDURE — 3046F PR MOST RECENT HEMOGLOBIN A1C LEVEL > 9.0%: ICD-10-PCS | Mod: ,,, | Performed by: NURSE PRACTITIONER

## 2023-11-03 PROCEDURE — 4010F PR ACE/ARB THEARPY RXD/TAKEN: ICD-10-PCS | Mod: ,,, | Performed by: NURSE PRACTITIONER

## 2023-11-03 PROCEDURE — 1100F PTFALLS ASSESS-DOCD GE2>/YR: CPT | Mod: ,,, | Performed by: NURSE PRACTITIONER

## 2023-11-03 PROCEDURE — 1126F PR PAIN SEVERITY QUANTIFIED, NO PAIN PRESENT: ICD-10-PCS | Mod: ,,, | Performed by: NURSE PRACTITIONER

## 2023-11-03 PROCEDURE — 3288F FALL RISK ASSESSMENT DOCD: CPT | Mod: ,,, | Performed by: NURSE PRACTITIONER

## 2023-11-03 PROCEDURE — 3288F PR FALLS RISK ASSESSMENT DOCUMENTED: ICD-10-PCS | Mod: ,,, | Performed by: NURSE PRACTITIONER

## 2023-11-03 PROCEDURE — 1100F PR PT FALLS ASSESS DOC 2+ FALLS/FALL W/INJURY/YR: ICD-10-PCS | Mod: ,,, | Performed by: NURSE PRACTITIONER

## 2023-11-03 PROCEDURE — 3078F PR MOST RECENT DIASTOLIC BLOOD PRESSURE < 80 MM HG: ICD-10-PCS | Mod: ,,, | Performed by: NURSE PRACTITIONER

## 2023-11-03 PROCEDURE — 4010F ACE/ARB THERAPY RXD/TAKEN: CPT | Mod: ,,, | Performed by: NURSE PRACTITIONER

## 2023-11-03 PROCEDURE — 1159F PR MEDICATION LIST DOCUMENTED IN MEDICAL RECORD: ICD-10-PCS | Mod: ,,, | Performed by: NURSE PRACTITIONER

## 2023-11-03 PROCEDURE — 3046F HEMOGLOBIN A1C LEVEL >9.0%: CPT | Mod: ,,, | Performed by: NURSE PRACTITIONER

## 2023-11-03 PROCEDURE — 3074F SYST BP LT 130 MM HG: CPT | Mod: ,,, | Performed by: NURSE PRACTITIONER

## 2023-11-03 NOTE — PROGRESS NOTES
SUBJECTIVE:     History of Present Illness      Chief Complaint: Medicare AWV (Discuss lab results.  C/O right lower leg swelling with rash)    HPI:  Patient is a 73 y.o. year old male who presents to clinic for  annual wellness and lab review.  Patient recently established care of a few weeks ago.  Patient is a poor historian.  Did not bring medication bottles with him.  He has a friend with him at his appointment, she  states home he is not sure what medication he is taking  due to pet cats playing with medicine bottles .   He states that his medication bottles are hard to identify.    Per EMR loose like diabetes has not been control for the last 3 years ranging 9- 14 a1c.    Current A1c 11.2      Review of Systems:    Review of Systems    12 point review of systems conducted, negative except as stated in the history of present illness. See HPI for details.     Previous History      Review of patient's allergies indicates:   Allergen Reactions    Latex Rash       Past Medical History:   Diagnosis Date    Arthritis     Bipolar disorder, unspecified     CHF (congestive heart failure)     Coronary artery disease     Diabetes mellitus     GERD (gastroesophageal reflux disease)     High cholesterol     Hx of eye surgery     Hypertension     ST elevation (STEMI) myocardial infarction of unspecified site     Stroke      Current Outpatient Medications   Medication Instructions    fenofibrate micronized (ANTARA) 130 mg, Oral    furosemide (LASIX) 40 mg, Oral, Daily    insulin aspart U-100 (NOVOLOG) 17 Units, Subcutaneous, 3 times daily    insulin detemir U-100 (LEVEMIR) 30 Units, Subcutaneous, Nightly    magnesium oxide (MAG-OX) 400 mg (241.3 mg magnesium) tablet 1 tablet, Oral    metFORMIN (GLUCOPHAGE) 500 mg, Oral, 2 times daily with meals    REPATHA PUSHTRONEX 420 mg, Subcutaneous    traZODone (DESYREL) 25 mg, Oral, Nightly     Past Surgical History:   Procedure Laterality Date    COLONOSCOPY  07/21/2015    SIMON Lim  "Janie/ Repeat in 10 years    CORONARY ARTERY BYPASS GRAFT      KNEE SURGERY       History reviewed. No pertinent family history.    Social History     Tobacco Use    Smoking status: Former    Smokeless tobacco: Never   Substance Use Topics    Alcohol use: Yes     Alcohol/week: 1.0 standard drink of alcohol     Types: 1 Cans of beer per week     Comment: socially    Drug use: Never        Health Maintenance      Health Maintenance   Topic Date Due    Hepatitis C Screening  Never done    Foot Exam  Never done    Eye Exam  Never done    TETANUS VACCINE  Never done    High Dose Statin  Never done    Abdominal Aortic Aneurysm Screening  Never done    Shingles Vaccine (3 of 3) 09/07/2022    Hemoglobin A1c  01/30/2024    Lipid Panel  10/30/2024    Colorectal Cancer Screening  07/21/2025       OBJECTIVE:     Physical Exam      Vital Signs Reviewed   Visit Vitals  /73 (BP Location: Right arm, Patient Position: Sitting)   Pulse 67   Temp 97.3 °F (36.3 °C) (Oral)   Resp 16   Ht 5' 7" (1.702 m)   Wt 88.7 kg (195 lb 9.6 oz)   SpO2 99%   BMI 30.64 kg/m²       Physical Exam    Physical Exam:  General: Alert, , no acute distress, disheveled/ malodorous    Eyes: Anicteric sclera, without conjunctival injection, normal lids, no purulent drainage, EOMs grossly intact.   Ears: No tragal tenderness. Tympanic membranes intact, pearly grey, without effusion or erythema and with a positive light reflex.   Mouth: Posterior pharynx without erythema. No exudate, ulcerations, or lesion. No tonsillar swelling.   Neck: Supple, full ROM, no rigidity, no cervical adenopathy.   Cardio: Normal rate and rhythm    Resp: Respirations even and unlabored, clear to auscultation bilaterally.   Abd: No ecchymosis or distension. Normal bowel sounds in all 4 quadrants. No tenderness to palpation. No rebound tenderness or guarding. No CVA tenderness.   Skin: No rashes or open lesions noted.   MSK: No swelling. No abrasions or signs of trauma. " Ambulating without assistance.   Neuro: Alert,oriented No focal deficits noted. Facial expressions even.   Psych: Cooperative, Normal affect      Labs     Results for orders placed or performed in visit on 10/30/23   Comprehensive Metabolic Panel   Result Value Ref Range    Sodium Level 139 136 - 145 mmol/L    Potassium Level 4.1 3.5 - 5.1 mmol/L    Chloride 101 98 - 107 mmol/L    Carbon Dioxide 26 23 - 31 mmol/L    Glucose Level 416 (H) 82 - 115 mg/dL    Blood Urea Nitrogen 10.9 8.4 - 25.7 mg/dL    Creatinine 1.04 0.73 - 1.18 mg/dL    Calcium Level Total 9.5 8.8 - 10.0 mg/dL    Protein Total 7.5 5.8 - 7.6 gm/dL    Albumin Level 3.4 3.4 - 4.8 g/dL    Globulin 4.1 (H) 2.4 - 3.5 gm/dL    Albumin/Globulin Ratio 0.8 (L) 1.1 - 2.0 ratio    Bilirubin Total 0.6 <=1.5 mg/dL    Alkaline Phosphatase 70 40 - 150 unit/L    Alanine Aminotransferase 14 0 - 55 unit/L    Aspartate Aminotransferase 18 5 - 34 unit/L    eGFR >60 mls/min/1.73/m2   Lipid Panel   Result Value Ref Range    Cholesterol Total 227 (H) <=200 mg/dL    HDL Cholesterol 29 (L) 35 - 60 mg/dL    Triglyceride 571 (H) 34 - 140 mg/dL    Cholesterol/HDL Ratio 8 (H) 0 - 5    Very Low Density Lipoprotein 114     LDL Cholesterol 84.00 50.00 - 140.00 mg/dL   TSH   Result Value Ref Range    TSH 1.455 0.350 - 4.940 uIU/mL   Hemoglobin A1C   Result Value Ref Range    Hemoglobin A1c 11.2 (H) <=7.0 %    Estimated Average Glucose 274.7 mg/dL   Vitamin D   Result Value Ref Range    Vit D 25 OH 19.9 (L) 30.0 - 80.0 ng/mL   T4, Free   Result Value Ref Range    Thyroxine Free 0.96 0.70 - 1.48 ng/dL   PSA, Screening   Result Value Ref Range    Prostate Specific Antigen 0.10 <=4.00 ng/mL   Urinalysis   Result Value Ref Range    Color, UA Yellow Yellow, Light-Yellow, Dark Yellow, Krystle, Straw    Appearance, UA Clear Clear    Specific Gravity, UA 1.020 1.005 - 1.030    pH, UA 5.0 5.0 - 8.5    Protein, UA 30 (A) Negative    Glucose,  (A) Negative, Normal    Ketones, UA Negative  Negative    Blood, UA Negative Negative    Bilirubin, UA Negative Negative    Urobilinogen, UA 0.2 0.2, 1.0, Normal    Nitrites, UA Negative Negative    Leukocyte Esterase, UA Negative Negative   CBC with Differential   Result Value Ref Range    WBC 6.04 4.50 - 11.50 x10(3)/mcL    RBC 5.23 4.70 - 6.10 x10(6)/mcL    Hgb 15.6 14.0 - 18.0 g/dL    Hct 45.1 42.0 - 52.0 %    MCV 86.2 80.0 - 94.0 fL    MCH 29.8 27.0 - 31.0 pg    MCHC 34.6 33.0 - 36.0 g/dL    RDW 12.6 11.5 - 17.0 %    Platelet 183 130 - 400 x10(3)/mcL    MPV 10.4 7.4 - 10.4 fL    Neut % 44.2 %    Lymph % 44.4 %    Mono % 7.8 %    Eos % 3.1 %    Basophil % 0.3 %    Lymph # 2.68 0.6 - 4.6 x10(3)/mcL    Neut # 2.67 2.1 - 9.2 x10(3)/mcL    Mono # 0.47 0.1 - 1.3 x10(3)/mcL    Eos # 0.19 0 - 0.9 x10(3)/mcL    Baso # 0.02 <=0.2 x10(3)/mcL    IG# 0.01 0 - 0.04 x10(3)/mcL    IG% 0.2 %   Urinalysis, Microscopic   Result Value Ref Range    Bacteria, UA None Seen None Seen, Rare, Occasional /HPF    RBC, UA None Seen None Seen, 0-2, 3-5, 0-5 /HPF    WBC, UA 0-2 None Seen, 0-2, 3-5, 0-5 /HPF    Squamous Epithelial Cells, UA Rare None Seen, Rare, Occasional, Occ /HPF       Chemistry:  Lab Results   Component Value Date     10/30/2023    K 4.1 10/30/2023    CHLORIDE 101 10/30/2023    BUN 10.9 10/30/2023    CREATININE 1.04 10/30/2023    EGFRNORACEVR >60 10/30/2023    GLUCOSE 416 (H) 10/30/2023    CALCIUM 9.5 10/30/2023    ALKPHOS 70 10/30/2023    LABPROT 7.5 10/30/2023    ALBUMIN 3.4 10/30/2023    BILIDIR 0.2 05/25/2021    IBILI 0.30 05/25/2021    AST 18 10/30/2023    ALT 14 10/30/2023    MG 1.80 08/26/2023    PHOS 2.4 03/10/2023    [id number] 19.9 (L) 10/30/2023    TSH 1.455 10/30/2023    [id number] 0.96 10/30/2023    PSA 0.10 10/30/2023        Lab Results   Component Value Date    HGBA1C 11.2 (H) 10/30/2023        Hematology:  Lab Results   Component Value Date    WBC 6.04 10/30/2023    HGB 15.6 10/30/2023    HCT 45.1 10/30/2023     10/30/2023       Lipid  Panel:  Lab Results   Component Value Date    CHOL 227 (H) 10/30/2023    HDL 29 (L) 10/30/2023    LDL 84.00 10/30/2023    TRIG 571 (H) 10/30/2023    TOTALCHOLEST 8 (H) 10/30/2023        Urine:  Lab Results   Component Value Date    COLORUA Yellow 10/30/2023    APPEARANCEUA Clear 10/30/2023    SGUA 1.020 10/30/2023    PHUA 5.0 10/30/2023    PROTEINUA 30 (A) 10/30/2023    GLUCOSEUA 500 (A) 10/30/2023    KETONESUA Negative 10/30/2023    BLOODUA Negative 10/30/2023    NITRITESUA Negative 10/30/2023    LEUKOCYTESUR Negative 10/30/2023    RBCUA None Seen 10/30/2023    WBCUA 0-2 10/30/2023    BACTERIA None Seen 10/30/2023    CREATRANDUR 200.7 (H) 12/27/2022         Assessment            ICD-10-CM ICD-9-CM   1. Wellness examination  Z00.00 V70.0   2. Hypertension, unspecified type  I10 401.9   3. Type 2 diabetes mellitus with hyperglycemia, with long-term current use of insulin  E11.65 250.00    Z79.4 790.29     V58.67   4. Noncompliance with diet and medication regimen  Z91.199 V15.81    Z91.148        Plan       1. Wellness examination  Discussed labs and preventative screenings   Overall health status reviewed.    Significant chronic conditions addressed, including ongoing treatment plans.   Good health habits reinforced.    Cardiovascular disease risk factors discussed.   Appropriate recommendations and preventative care medical   information provided with annual wellness exams encouraged.  Vaccination status   Mammo  Colon  PSA  Cervical     2. Hypertension, unspecified type  - Ambulatory referral/consult to Home Health; Future    3. Type 2 diabetes mellitus with hyperglycemia, with long-term current use of insulin  - Ambulatory referral/consult to Home Health; Future  - Ambulatory referral/consult to Diabetes Education; Future  - metFORMIN (GLUCOPHAGE) 500 MG tablet; Take 1 tablet (500 mg total) by mouth 2 (two) times daily with meals.  Dispense: 180 tablet; Refill: 3   Continue Levemir and NovoLog as prescribed  4.  Noncompliance with diet and medication regimen  - Ambulatory referral/consult to Diabetes Education; Future    Orders Placed This Encounter    Ambulatory referral/consult to Home Health    Ambulatory referral/consult to Diabetes Education    metFORMIN (GLUCOPHAGE) 500 MG tablet      Medication List with Changes/Refills   New Medications    METFORMIN (GLUCOPHAGE) 500 MG TABLET    Take 1 tablet (500 mg total) by mouth 2 (two) times daily with meals.   Current Medications    EVOLOCUMAB (REPATHA PUSHTRONEX) 420 MG/3.5 ML INJT    Inject 420 mg into the skin.    FENOFIBRATE MICRONIZED (ANTARA) 130 MG CAPSULE    Take 130 mg by mouth.    FUROSEMIDE (LASIX) 40 MG TABLET    Take 1 tablet (40 mg total) by mouth once daily. for 5 days    INSULIN ASPART U-100 (NOVOLOG) 100 UNIT/ML INJECTION    Inject 17 Units into the skin 3 (three) times daily.    INSULIN DETEMIR U-100 (LEVEMIR) 100 UNIT/ML INJECTION    Inject 30 Units into the skin every evening.    MAGNESIUM OXIDE (MAG-OX) 400 MG (241.3 MG MAGNESIUM) TABLET    Take 1 tablet by mouth.    TRAZODONE (DESYREL) 50 MG TABLET    Take 25 mg by mouth every evening.       Follow up in about 6 weeks (around 12/15/2023).   Follow up in about 6 weeks (around 12/15/2023). In addition to their scheduled follow up, the patient has also been instructed to follow up on as needed basis.   Future Appointments   Date Time Provider Department Center   12/15/2023 10:30 AM Lakisha Sorensen FNP [facility]   11/7/2024 10:00 AM Lakisha Sorensen [facility]         Advance Care Planning   I attest to discussing Advance Care Planning with patient and/or family member.  Education was provided including the importance of the Health Care Power of , Advance Directives, and/or [place] documentation.  The patient expressed understanding to the importance of this information and discussion.

## 2023-11-06 RX ORDER — METFORMIN HYDROCHLORIDE 500 MG/1
500 TABLET ORAL 2 TIMES DAILY WITH MEALS
Qty: 180 TABLET | Refills: 3 | Status: SHIPPED | OUTPATIENT
Start: 2023-11-06 | End: 2023-12-15 | Stop reason: SDUPTHER

## 2023-11-07 ENCOUNTER — HOSPITAL ENCOUNTER (EMERGENCY)
Facility: HOSPITAL | Age: 73
Discharge: HOME | End: 2023-11-07
Attending: EMERGENCY MEDICINE
Payer: MEDICARE

## 2023-11-07 ENCOUNTER — TELEPHONE (OUTPATIENT)
Dept: DIABETES | Facility: CLINIC | Age: 73
End: 2023-11-07
Payer: MEDICARE

## 2023-11-07 VITALS
HEIGHT: 69 IN | HEART RATE: 62 BPM | RESPIRATION RATE: 18 BRPM | SYSTOLIC BLOOD PRESSURE: 126 MMHG | BODY MASS INDEX: 29.62 KG/M2 | TEMPERATURE: 98 F | WEIGHT: 200 LBS | OXYGEN SATURATION: 98 % | DIASTOLIC BLOOD PRESSURE: 75 MMHG

## 2023-11-07 DIAGNOSIS — Z13.9 ENCOUNTER FOR MEDICAL SCREENING EXAMINATION: Primary | ICD-10-CM

## 2023-11-07 PROCEDURE — 99283 EMERGENCY DEPT VISIT LOW MDM: CPT

## 2023-11-07 RX ORDER — TRAZODONE HYDROCHLORIDE 50 MG/1
25 TABLET ORAL NIGHTLY
Qty: 30 TABLET | Refills: 2 | Status: SHIPPED | OUTPATIENT
Start: 2023-11-07
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
## 2023-11-11 NOTE — PLAN OF CARE
[facility]  Discharge Final Note    Primary Care Provider: Lakisha Sorensen FNP    Expected Discharge Date: 8/28/2023    Final Discharge Note (most recent)       Final Note - 11/11/23 1644          Final Note    Assessment Type Final Discharge Note     Anticipated Discharge Disposition Home or Self Care     What phone number can be called within the next 1-3 days to see how you are doing after discharge? [phone]     Hospital Resources/Appts/Education Provided Provided patient/caregiver with written discharge plan information        Post-Acute Status    Discharge Delays None known at this time                     Important Message from Medicare  Important Message from Medicare regarding Discharge Appeal Rights: Other (comments)          Contact Info       Lakisha Sorensen FNP   Specialty: Family Medicine   Relationship: PCP - [address] [address]   Phone: [phone]       Next Steps: Go on 9/19/2023    Instructions: Follow up with CLAUDIA Vinson on Tuesday, September 19, 2023 @ 3:15pm.  Spoke to Alona and she will call the patient if there is a sooner appointment available.

## 2023-11-15 ENCOUNTER — TELEPHONE (OUTPATIENT)
Dept: FAMILY MEDICINE | Facility: CLINIC | Age: 73
End: 2023-11-15

## 2023-11-15 DIAGNOSIS — E11.00 HYPEROSMOLAR HYPERGLYCEMIC STATE (HHS): Primary | ICD-10-CM

## 2023-11-15 NOTE — TELEPHONE ENCOUNTER
According to his roommate -Mr joshi doesn't look good , face swollen, sugar maybe high. Room mate says he don't want home health to go and he wants to die, I told her to call the ambulance and let them handle it . Room mate says he looks bad and don't want him to die with her there . Trying to locate his home health .

## 2023-11-15 NOTE — TELEPHONE ENCOUNTER
----- Message from Dayanna Prabhakar LPN sent at 11/15/2023  4:23 PM CST -----    ----- Message -----  From: Nicolle Fregoso  Sent: 11/15/2023   4:21 PM CST  To: [facility] Staff    .Type:  Needs Medical Advice    Who Called: pt roommate  Symptoms (please be specific):    How long has patient had these symptoms:    Pharmacy name and phone #:    Would the patient rather a call back or a response via [facility]?   Best Call Back Number: [phone]  Additional Information: Rae pt roommate was calling to let you know the pt refuse to go to hospital I was on hold but nobody came on the line again

## 2023-11-15 NOTE — TELEPHONE ENCOUNTER
Korin- patient room mate called said patient blood sugar is 420, ambulance went , police went They couldn't make him go. I advised for her to keep and eye on him if for some reason he gets worse or non responsive to call the ambulance again. That their wasn't much we could do in this situation .

## 2023-11-15 NOTE — TELEPHONE ENCOUNTER
----- Message from Ida Rodriguez sent at 11/15/2023  3:39 PM CST -----  Regarding: Call Back  .Type:  Patient Returning Call    Who Called:Carlyn  Who Left Message for Patient:Room mate  Does the patient know what this is regarding?:Home Health  Would the patient rather a call back or a response via [facility]?   Best Call Back Number:[phone]  Additional Information: Please call back about not wanting home health

## 2023-11-16 RX ORDER — LANCETS
1 EACH MISCELLANEOUS 3 TIMES DAILY
Qty: 100 EACH | Refills: 3 | Status: SHIPPED | OUTPATIENT
Start: 2023-11-16

## 2023-11-16 RX ORDER — ISOPROPYL ALCOHOL 70 ML/100ML
1 SWAB TOPICAL
Qty: 100 EACH | Refills: 3 | Status: SHIPPED | OUTPATIENT
Start: 2023-11-16

## 2023-11-16 RX ORDER — INSULIN PUMP SYRINGE, 3 ML
EACH MISCELLANEOUS
Qty: 1 EACH | Refills: 0 | Status: SHIPPED | OUTPATIENT
Start: 2023-11-16 | End: 2024-11-15

## 2023-11-16 NOTE — TELEPHONE ENCOUNTER
11/16/23- leslie [phone] they need help with him , he is non compliant , fits of hollering and screaming , blood sugar is still high and she wants to know if he is a candidate for in patient care or nursing home.     11/16/23 Talked to syed with [facility] she said she was calling her supervisor  someone to go today .

## 2023-11-16 NOTE — TELEPHONE ENCOUNTER
----- Message from Dayanna Prabhakar LPN sent at 11/16/2023  2:07 PM CST -----  Regarding: FW: diabetic supply    ----- Message -----  From: Rosa Maria Rivera  Sent: 11/16/2023   1:54 PM CST  To: Franchesca Banuelos Staff  Subject: diabetic supply                                  Type:  Needs Medical Advice    Who Called: Elva @ home health    Would the patient rather a call back or a response via [facility]? C/b  Best Call Back Number: [phone]    Additional Information: pt needs order for glucometer no meter at home, pt did not go to ER yesterday (with sugar of 420)  [facility] in [place]

## 2023-11-16 NOTE — TELEPHONE ENCOUNTER
Patient has been admitted to home health cause he wasn't available , Gm from [facility] said that she will try again.

## 2023-11-17 ENCOUNTER — TELEPHONE (OUTPATIENT)
Dept: FAMILY MEDICINE | Facility: CLINIC | Age: 73
End: 2023-11-17

## 2023-11-18 ENCOUNTER — HOSPITAL ENCOUNTER (EMERGENCY)
Facility: HOSPITAL | Age: 73
Discharge: HOME | End: 2023-11-18
Attending: STUDENT IN AN ORGANIZED HEALTH CARE EDUCATION/TRAINING PROGRAM
Payer: MEDICARE

## 2023-11-18 ENCOUNTER — HOSPITAL ENCOUNTER (EMERGENCY)
Facility: HOSPITAL | Age: 73
Discharge: HOME | End: 2023-11-18
Payer: MEDICARE

## 2023-11-18 VITALS
HEART RATE: 61 BPM | RESPIRATION RATE: 18 BRPM | TEMPERATURE: 99 F | DIASTOLIC BLOOD PRESSURE: 68 MMHG | BODY MASS INDEX: 28.14 KG/M2 | WEIGHT: 190 LBS | OXYGEN SATURATION: 97 % | SYSTOLIC BLOOD PRESSURE: 110 MMHG | HEIGHT: 69 IN

## 2023-11-18 VITALS
BODY MASS INDEX: 28.14 KG/M2 | HEART RATE: 63 BPM | TEMPERATURE: 99 F | SYSTOLIC BLOOD PRESSURE: 126 MMHG | DIASTOLIC BLOOD PRESSURE: 57 MMHG | RESPIRATION RATE: 18 BRPM | WEIGHT: 190 LBS | HEIGHT: 69 IN | OXYGEN SATURATION: 98 %
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
## 2023-11-18 DIAGNOSIS — E11.65 HYPERGLYCEMIA DUE TO DIABETES MELLITUS: Primary | ICD-10-CM

## 2023-11-18 DIAGNOSIS — S40.011A CONTUSION OF RIGHT SHOULDER, INITIAL ENCOUNTER: ICD-10-CM

## 2023-11-18 DIAGNOSIS — S60.221A CONTUSION OF RIGHT HAND, INITIAL ENCOUNTER: ICD-10-CM

## 2023-11-18 DIAGNOSIS — S05.11XA CONTUSION OF RIGHT ORBIT, INITIAL ENCOUNTER: Primary | ICD-10-CM

## 2023-11-18 DIAGNOSIS — S09.90XA CLOSED HEAD INJURY, INITIAL ENCOUNTER: ICD-10-CM

## 2023-11-18 DIAGNOSIS — S01.81XA FACIAL LACERATION, INITIAL ENCOUNTER: ICD-10-CM

## 2023-11-18 LAB
ALBUMIN SERPL-MCNC: 3.7 G/DL (ref 3.4–5)
ALBUMIN/GLOB SERPL: 1.3 RATIO
ALP SERPL-CCNC: 74 UNIT/L (ref 50–144)
ALT SERPL-CCNC: 35 UNIT/L (ref 1–45)
ANION GAP SERPL CALC-SCNC: 10 MEQ/L (ref 2–13)
APPEARANCE UR: CLEAR
AST SERPL-CCNC: 45 UNIT/L (ref 17–59)
BASOPHILS # BLD AUTO: 0.05 X10(3)/MCL (ref 0.01–0.08)
BASOPHILS NFR BLD AUTO: 0.7 % (ref 0.1–1.2)
BILIRUB SERPL-MCNC: 0.5 MG/DL (ref 0–1)
BILIRUB UR QL STRIP.AUTO: NEGATIVE
BUN SERPL-MCNC: 24 MG/DL (ref 7–20)
CALCIUM SERPL-MCNC: 9.2 MG/DL (ref 8.4–10.2)
CHLORIDE SERPL-SCNC: 95 MMOL/L (ref 98–110)
CO2 SERPL-SCNC: 24 MMOL/L (ref 21–32)
COLOR UR AUTO: YELLOW
CREAT SERPL-MCNC: 0.92 MG/DL (ref 0.66–1.25)
CREAT/UREA NIT SERPL: 26 (ref 12–20)
EOSINOPHIL # BLD AUTO: 0.17 X10(3)/MCL (ref 0.04–0.54)
EOSINOPHIL NFR BLD AUTO: 2.3 % (ref 0.7–7)
ERYTHROCYTE [DISTWIDTH] IN BLOOD BY AUTOMATED COUNT: 12.2 %
GFR SERPLBLD CREATININE-BSD FMLA CKD-EPI: 87 MLS/MIN/1.73/M2
GLOBULIN SER-MCNC: 2.8 GM/DL (ref 2–3.9)
GLUCOSE SERPL-MCNC: 628 MG/DL (ref 70–115)
GLUCOSE UR QL STRIP.AUTO: >=1000
HCT VFR BLD AUTO: 40.7 % (ref 36–52)
HGB BLD-MCNC: 14.6 G/DL (ref 13–18)
IMM GRANULOCYTES # BLD AUTO: 0.02 X10(3)/MCL (ref 0–0.03)
IMM GRANULOCYTES NFR BLD AUTO: 0.3 % (ref 0–0.5)
KETONES UR QL STRIP.AUTO: NEGATIVE
LEUKOCYTE ESTERASE UR QL STRIP.AUTO: NEGATIVE
LYMPHOCYTES # BLD AUTO: 2.93 X10(3)/MCL (ref 1.32–3.57)
LYMPHOCYTES NFR BLD AUTO: 39.5 % (ref 20–55)
MCH RBC QN AUTO: 31 PG (ref 27–34)
MCHC RBC AUTO-ENTMCNC: 35.9 G/DL (ref 31–37)
MCV RBC AUTO: 86.4 FL (ref 79–99)
MONOCYTES # BLD AUTO: 0.71 X10(3)/MCL (ref 0.3–0.82)
MONOCYTES NFR BLD AUTO: 9.6 % (ref 4.7–12.5)
NEUTROPHILS # BLD AUTO: 3.54 X10(3)/MCL (ref 1.78–5.38)
NEUTROPHILS NFR BLD AUTO: 47.6 % (ref 37–73)
NITRITE UR QL STRIP.AUTO: NEGATIVE
NRBC BLD AUTO-RTO: 0 %
PH UR STRIP.AUTO: 5.5 [PH]
PLATELET # BLD AUTO: 142 X10(3)/MCL (ref 140–371)
PMV BLD AUTO: 11.3 FL (ref 9.4–12.4)
POCT GLUCOSE: 303 MG/DL (ref 70–110)
POCT GLUCOSE: 439 MG/DL (ref 70–110)
POCT GLUCOSE: >500 MG/DL (ref 70–110)
POTASSIUM SERPL-SCNC: 4.2 MMOL/L (ref 3.5–5.1)
PROT SERPL-MCNC: 6.5 GM/DL (ref 6.3–8.2)
PROT UR QL STRIP.AUTO: NEGATIVE
RBC # BLD AUTO: 4.71 X10(6)/MCL (ref 4–6)
RBC UR QL AUTO: NEGATIVE
SODIUM SERPL-SCNC: 129 MMOL/L (ref 135–145)
SP GR UR STRIP.AUTO: 1.01 (ref 1–1.03)
UROBILINOGEN UR STRIP-ACNC: 0.2
WBC # SPEC AUTO: 7.42 X10(3)/MCL (ref 4–11.5)

## 2023-11-18 PROCEDURE — 81003 URINALYSIS AUTO W/O SCOPE: CPT

## 2023-11-18 PROCEDURE — 25000003 PHARM REV CODE 250: Performed by: STUDENT IN AN ORGANIZED HEALTH CARE EDUCATION/TRAINING PROGRAM

## 2023-11-18 PROCEDURE — 96361 HYDRATE IV INFUSION ADD-ON: CPT | Mod: 59

## 2023-11-18 PROCEDURE — 96374 THER/PROPH/DIAG INJ IV PUSH: CPT

## 2023-11-18 PROCEDURE — 93010 ELECTROCARDIOGRAM REPORT: CPT | Mod: ,,, | Performed by: INTERNAL MEDICINE

## 2023-11-18 PROCEDURE — 63600175 PHARM REV CODE 636 W HCPCS

## 2023-11-18 PROCEDURE — 93005 ELECTROCARDIOGRAM TRACING: CPT

## 2023-11-18 PROCEDURE — 80053 COMPREHEN METABOLIC PANEL: CPT

## 2023-11-18 PROCEDURE — 25000003 PHARM REV CODE 250

## 2023-11-18 PROCEDURE — 12011 RPR F/E/E/N/L/M 2.5 CM/<: CPT

## 2023-11-18 PROCEDURE — 99285 EMERGENCY DEPT VISIT HI MDM: CPT | Mod: 25,27

## 2023-11-18 PROCEDURE — 85025 COMPLETE CBC W/AUTO DIFF WBC: CPT

## 2023-11-18 PROCEDURE — 99283 EMERGENCY DEPT VISIT LOW MDM: CPT | Mod: 25

## 2023-11-18 PROCEDURE — 82962 GLUCOSE BLOOD TEST: CPT | Mod: 91

## 2023-11-18 PROCEDURE — 93010 EKG 12-LEAD: ICD-10-PCS | Mod: ,,, | Performed by: INTERNAL MEDICINE

## 2023-11-18 RX ORDER — LIDOCAINE HYDROCHLORIDE 10 MG/ML
INJECTION INFILTRATION; PERINEURAL
Status: COMPLETED
Start: 2023-11-18 | End: 2023-11-18

## 2023-11-18 RX ORDER — NAPROXEN 500 MG/1
500 TABLET ORAL 2 TIMES DAILY
Qty: 20 TABLET | Refills: 0 | Status: SHIPPED | OUTPATIENT
Start: 2023-11-18

## 2023-11-18 RX ORDER — SODIUM CHLORIDE 9 MG/ML
1000 INJECTION, SOLUTION INTRAVENOUS
Status: COMPLETED | OUTPATIENT
Start: 2023-11-18 | End: 2023-11-18

## 2023-11-18 RX ORDER — NAPROXEN 500 MG/1
500 TABLET ORAL
Status: COMPLETED | OUTPATIENT
Start: 2023-11-18 | End: 2023-11-18

## 2023-11-18 RX ORDER — TIZANIDINE 4 MG/1
4 TABLET ORAL EVERY 6 HOURS PRN
Qty: 20 TABLET | Refills: 0 | Status: SHIPPED | OUTPATIENT
Start: 2023-11-18

## 2023-11-18 RX ADMIN — INSULIN HUMAN 15 UNITS: 100 INJECTION, SOLUTION PARENTERAL at 03:11

## 2023-11-18 RX ADMIN — LIDOCAINE HYDROCHLORIDE 200 MG: 10 INJECTION, SOLUTION INFILTRATION; PERINEURAL at 02:11

## 2023-11-18 RX ADMIN — NAPROXEN 500 MG: 500 TABLET ORAL at 12:11

## 2023-11-18 RX ADMIN — SODIUM CHLORIDE 1000 ML: 9 INJECTION, SOLUTION INTRAVENOUS at 01:11

## 2023-11-18 RX ADMIN — SODIUM CHLORIDE 1000 ML: 9 INJECTION, SOLUTION INTRAVENOUS at 03:11

## 2023-11-18 NOTE — ED PROVIDER NOTES
Encounter Date: 11/18/2023       History     Chief Complaint   Patient presents with    Fall    Laceration     C/o lac to forehead, 2nd to lost balance and fell,  rt shoulder pain pt is from [facility] and was dropping a friend off in [place], became lost and ended up in [place], denies loc, aao x 3 at present, incontinent of urine      73-year-old male presents after falling and injuring his head, right shoulder, and right hand.  He lost his balance.  This is a common occurrence for him.  EMS also reports that his CBG read high.  He has a history of diabetes, and due to technical problems, has not checked his blood sugar in a week.  He denies feeling ill otherwise.  He denies any chest pain or shortness of breath.    The history is provided by the patient and the EMS personnel.     Review of patient's allergies indicates:   Allergen Reactions    Latex Rash     Past Medical History:   Diagnosis Date    Arthritis     Bipolar disorder, unspecified     CHF (congestive heart failure)     Coronary artery disease     Diabetes mellitus     GERD (gastroesophageal reflux disease)     High cholesterol     Hx of eye surgery     Hypertension     ST elevation (STEMI) myocardial infarction of unspecified site     Stroke      Past Surgical History:   Procedure Laterality Date    COLONOSCOPY  07/21/2015    SIMON Lima/ Repeat in 10 years    CORONARY ARTERY BYPASS GRAFT      KNEE SURGERY       History reviewed. No pertinent family history.  Social History     Tobacco Use    Smoking status: Former     Types: Cigarettes    Smokeless tobacco: Never   Substance Use Topics    Alcohol use: Not Currently     Alcohol/week: 1.0 standard drink of alcohol     Types: 1 Cans of beer per week     Comment: socially    Drug use: Never     Review of Systems   Constitutional:  Negative for fever.   HENT:  Negative for sore throat.    Respiratory:  Negative for shortness of breath.    Cardiovascular:  Negative for chest pain.    Gastrointestinal:  Negative for nausea.   Genitourinary:  Negative for dysuria.   Musculoskeletal:  Negative for back pain.        Pain in right hand and right shoulder   Skin:  Negative for rash.   Neurological:  Positive for headaches. Negative for weakness.   Hematological:  Does not bruise/bleed easily.   All other systems reviewed and are negative.      Physical Exam     Initial Vitals [11/18/23 0144]   BP Pulse Resp Temp SpO2   (!) 151/73 76 20 98.9 °F (37.2 °C) 96 %      MAP       --         Physical Exam    Nursing note and vitals reviewed.  Constitutional: Vital signs are normal. He appears well-developed and well-nourished. He is cooperative.   Elderly male in no apparent distress.  He is freely conversant and oriented.   HENT:   Head: Normocephalic.   Mouth/Throat: Oropharynx is clear and moist.   He has a laceration above his right eyebrow   Eyes: Conjunctivae, EOM and lids are normal. Pupils are equal, round, and reactive to light.   Neck: Trachea normal. Neck supple.   There is no posterior cervical tenderness   Normal range of motion.  Cardiovascular:  Normal rate, regular rhythm, normal heart sounds and intact distal pulses.           Pulmonary/Chest: Breath sounds normal.   Abdominal: Abdomen is soft. Bowel sounds are normal.   Musculoskeletal:         General: Tenderness and edema present. Normal range of motion.      Cervical back: Normal, normal range of motion and neck supple.      Lumbar back: Normal.      Comments: There is a hematoma with some mild tenderness over his 4th and 5th metacarpophalangeal joints on the right.  He also has some tenderness around the AC joint of the right shoulder.     Neurological: He is alert and oriented to person, place, and time. He has normal strength. Coordination normal. GCS score is 15. GCS eye subscore is 4. GCS verbal subscore is 5. GCS motor subscore is 6.   Skin: Skin is warm, dry and intact. Capillary refill takes less than 2 seconds.   Psychiatric:  He has a normal mood and affect. His speech is normal and behavior is normal. Judgment and thought content normal. Cognition and memory are normal.         ED Course   Lac Repair    Date/Time: 11/18/2023 1:40 AM    Performed by: Toney Ayala MD  Authorized by: Toney Ayala MD    Consent:     Consent obtained:  Verbal and emergent situation    Consent given by:  Patient    Risks, benefits, and alternatives were discussed: yes      Risks discussed:  Poor wound healing    Alternatives discussed:  Delayed treatment  Universal protocol:     Procedure explained and questions answered to patient or proxy's satisfaction: yes      Required blood products, implants, devices, and special equipment available: yes      Immediately prior to procedure, a time out was called: yes      Patient identity confirmed:  Verbally with patient  Anesthesia:     Anesthesia method:  None  Laceration details:     Location:  Face    Face location:  Forehead    Length (cm):  2    Depth (mm):  1  Pre-procedure details:     Preparation:  Patient was prepped and draped in usual sterile fashion  Exploration:     Limited defect created (wound extended): no      Hemostasis achieved with:  Direct pressure    Wound exploration: wound explored through full range of motion and entire depth of wound visualized      Wound extent: no muscle damage noted, no underlying fracture noted and no vascular damage noted      Contaminated: yes    Treatment:     Area cleansed with:  Povidone-iodine    Amount of cleaning:  Standard    Visualized foreign bodies/material removed: no      Debridement:  None    Undermining:  None    Scar revision: no    Skin repair:     Repair method:  Tissue adhesive  Approximation:     Approximation:  Close  Repair type:     Repair type:  Simple  Post-procedure details:     Dressing:  Open (no dressing)    Procedure completion:  Tolerated well, no immediate complications    Labs Reviewed   COMPREHENSIVE METABOLIC PANEL -  Abnormal; Notable for the following components:       Result Value    Sodium Level 129 (*)     Chloride 95 (*)     Glucose Level 628 (*)     Blood Urea Nitrogen 24.0 (*)     BUN/Creatinine Ratio 26 (*)     All other components within normal limits   URINALYSIS, REFLEX TO URINE CULTURE - Abnormal; Notable for the following components:    Glucose, UA >=1000 (*)     All other components within normal limits    Narrative:      URINE STABILITY IS 2 HOURS AT ROOM TEMP OR    SIX HOURS REFRIGERATED. PERFORMING TESTING ON    SPECIMENS GREATER THAN THIS AGE MAY AFFECT THE    FOLLOWING TESTS:    PH          SPECIFIC GRAVITY           BLOOD    CLARITY     BILIRUBIN               UROBILINOGEN   POCT GLUCOSE - Abnormal; Notable for the following components:    POCT Glucose >500 (*)     All other components within normal limits   POCT GLUCOSE - Abnormal; Notable for the following components:    POCT Glucose 439 (*)     All other components within normal limits   POCT GLUCOSE - Abnormal; Notable for the following components:    POCT Glucose 303 (*)     All other components within normal limits   CBC W/ AUTO DIFFERENTIAL    Narrative:     The following orders were created for panel order CBC auto differential.  Procedure                               Abnormality         Status                     ---------                               -----------         ------                     CBC with Differential[[id number]]                           Final result                 Please view results for these tests on the individual orders.   CBC WITH DIFFERENTIAL   POCT GLUCOSE, HAND-HELD DEVICE   POCT GLUCOSE, HAND-HELD DEVICE   POCT GLUCOSE, HAND-HELD DEVICE        ECG Results              EKG 12-lead (Preliminary result)  Result time 11/18/23 02:18:27      Wet Read by Toney Ayala MD (11/18/23 02:18:27, [facility])    Sinus rhythm with PACs, heart rate 78, there is a left bundle-branch  block with diffuse ST and T-wave abnormalities.  There is no evidence of a STEMI.  Normal axis.                                  Imaging Results              CT Head Without Contrast (Preliminary result)  Result time 11/18/23 03:00:11      Preliminary result by Hamlet Naidu MD (11/18/23 03:00:11)                   Narrative:    START OF REPORT:  Technique: CT of the head was performed without intravenous contrast with axial as well as coronal and sagittal images.    Comparison: None.    Dosage Information: Automated exposure control was utilized.    Clinical history: PT FELL TODAY - LACERATION TO FOREHEAD - HX OF STROKE.    Findings:  Hemorrhage: No acute intracranial hemorrhage is seen.  CSF spaces: The ventricles, sulci and basal cisterns all appear mildly prominent consistent with global cerebral atrophy. Additionally the ventricles appear dilated out of proportion to the sulci suggesting an element of concurrent non-obstructive hydrocephalus.  Brain parenchyma: There is preservation of the grey white junction throughout. Mild microvascular change is seen in portions of the periventricular and deep white matter tracts.  Cerebellum: Unremarkable.  Sella and skull base: The sella appears to be within normal limits for age.  Herniation: None.  Intracranial calcifications: Incidental note is made of bilateral choroid plexus calcification. Incidental note is made of some pineal region calcification. Incidental note is made of some calcification of the falx. Incidental note is made of subtle bilateral basal ganglia calcifcation.  Calvarium: No acute linear or depressed skull fracture is seen.    Maxillofacial Structures:  Orbits: There is right supraorbital and frontal soft tissue laceration and swelling. No intraorbital abnormality is detected.  Temporal bones and mastoids: The temporal bones and mastoids appear unremarkable.  TMJ: The mandibular condyles appear normally placed with respect to the mandibular  fossa.  Nasal Bones: There is moderate deviation of the nasal septum to the left.      Impression:  1. The ventricles, sulci and basal cisterns all appear mildly prominent consistent with global cerebral atrophy. Additionally the ventricles appear dilated out of proportion to the sulci suggesting an element of concurrent non-obstructive hydrocephalus.  2. There is right supraorbital and frontal soft tissue laceration and swelling.  3. No acute intracranial traumatic injury identified. Details and other findings as noted above.                                         X-Ray Shoulder Complete 2 View Right (Preliminary result)  Result time 11/18/23 02:28:54      Wet Read by Toney Ayala MD (11/18/23 02:28:54, [facility])    Advanced degenerative joint disease, no fracture, normal alignment                                     X-Ray Hand 3 view Right (Preliminary result)  Result time 11/18/23 02:30:21      Wet Read by Toney Ayala MD (11/18/23 02:30:21, [facility])    No fracture, normal alignment                                     Medications   0.9%  NaCl infusion (0 mLs Intravenous Stopped 11/18/23 0311)   LIDOcaine HCL 10 mg/ml (1%) 10 mg/mL (1 %) injection (200 mg  Given 11/18/23 0223)   insulin regular injection 15 Units 0.15 mL (15 Units Intravenous Given 11/18/23 0306)   sodium chloride 0.9% bolus 1,000 mL 1,000 mL (0 mLs Intravenous Stopped 11/18/23 0408)     Medical Decision Making  Status post fall, lost balance, injured head, right shoulder and right hand, hyperglycemia, history of diabetes  Differential diagnosis:  Intracranial hemorrhage, fracture, hydration, occult infection, AC separation  Gentle IV fluids, repair laceration  CT head, right hand and right shoulder x-rays, labs, EKG    Amount and/or Complexity of Data Reviewed  Labs: ordered. Decision-making details documented in ED  Course.  Radiology: ordered and independent interpretation performed. Decision-making details documented in ED Course.  Discussion of management or test interpretation with external provider(s): The injury workup showed no serious injuries.  His biggest problem at the moment is hyperglycemia.  This appears to be due to noncompliance.  There was no evidence of occult infection or occult MI.  Fluids and insulin ordered.    Risk  OTC drugs.  Prescription drug management.               ED Course as of 11/18/23 0441   Sat Nov 18, 2023   0237 CBC auto differential  Normal, stable [TM]   0237 Urinalysis, Reflex to Urine Culture(!)  Glucose in urine, no evidence of infection [TM]   0252 Comprehensive metabolic panel(!!)  Hyperglycemia with concomitant hyponatremia, BUN is elevated today. [TM]   0311 CT Head Without Contrast  No acute intracranial traumatic injury identified. [TM]      ED Course User Index  [TM] Toney Ayala MD                        Clinical Impression:  Final diagnoses:  [W19.XXXA] Fall  [S01.81XA] Facial laceration, initial encounter  [S09.90XA] Closed head injury, initial encounter  [S60.221A] Contusion of right hand, initial encounter  [S40.011A] Contusion of right shoulder, initial encounter  [E11.65] Hyperglycemia due to diabetes mellitus (Primary)          ED Disposition Condition    Discharge Good          ED Prescriptions       Medication Sig Dispense Start Date End Date Auth. Provider    naproxen (NAPROSYN) 500 MG tablet Take 1 tablet (500 mg total) by mouth 2 (two) times daily. 20 tablet 11/18/2023 -- Toney Ayala MD    tiZANidine (ZANAFLEX) 4 MG tablet Take 1 tablet (4 mg total) by mouth every 6 (six) hours as needed. 20 tablet 11/18/2023 -- Toney Ayala MD          Follow-up Information       Follow up With Specialties Details Why Contact Info    Lakisha Sorensen, FNP Family Medicine Call in 2 days  [address]  [facility]  [place] [address] [phone]                Toney Ayala MD  11/18/23 0440       Toney Ayala MD  11/18/23 0441

## 2023-11-18 NOTE — ED PROVIDER NOTES
Encounter Date: 11/18/2023       History     Chief Complaint   Patient presents with    Fall     Pt states he fell yesterday -c/o pain to R eye bruising and swelling noted, pain to R shoulder and R hand ---was seen in [place] [facility] and had multiple xrays and a CT and given meds --pt presents with [facility] with c/o pain request pain meds --states he did not know he had medications ordered yesterday at [facility] yesterday     HPI  Patient is a 73-year-old male past medical history bipolar disorder, CHF, CAD, diabetes, hypercholesteremia, who presents to the ER stating he was told by his home health nurse that he needed to be checked.  Patient presented to outside hospital yesterday status post mechanical fall.  States this happens quite often.  Patient received throat workup including CT head, x-rays, which were all negative for any acute fractures, underlying deformities.  Patient was prescribed pain medicine which was sent to outside pharmacy.  Review of patient's allergies indicates:   Allergen Reactions    Latex Rash     Past Medical History:   Diagnosis Date    Arthritis     Bipolar disorder, unspecified     CHF (congestive heart failure)     Coronary artery disease     Diabetes mellitus     GERD (gastroesophageal reflux disease)     High cholesterol     Hx of eye surgery     Hypertension     ST elevation (STEMI) myocardial infarction of unspecified site     Stroke      Past Surgical History:   Procedure Laterality Date    COLONOSCOPY  07/21/2015    SIMON Lima/ Repeat in 10 years    CORONARY ARTERY BYPASS GRAFT      KNEE SURGERY       No family history on file.  Social History     Tobacco Use    Smoking status: Former     Types: Cigarettes    Smokeless tobacco: Never   Substance Use Topics    Alcohol use: Not Currently     Alcohol/week: 1.0 standard drink of alcohol     Types: 1 Cans of beer per week     Comment: socially    Drug use: Never     Review of Systems   Constitutional:  Negative for fever.    HENT:  Negative for sore throat.    Eyes:  Negative for visual disturbance.        Right eye tenderness, bruising   Respiratory:  Negative for shortness of breath.    Cardiovascular:  Negative for chest pain.   Gastrointestinal:  Negative for nausea.   Endocrine: Negative for polyuria.   Genitourinary:  Negative for dysuria.   Musculoskeletal:  Negative for back pain.   Skin:  Negative for rash.   Neurological:  Negative for weakness.   Hematological:  Does not bruise/bleed easily.   All other systems reviewed and are negative.      Physical Exam     Initial Vitals [11/18/23 1236]   BP Pulse Resp Temp SpO2   126/65 72 18 98.9 °F (37.2 °C) 98 %      MAP       --         Physical Exam    Nursing note and vitals reviewed.  Constitutional: Vital signs are normal. He appears well-developed and well-nourished. He is not diaphoretic. He is active.  Non-toxic appearance. He does not appear ill. No distress.   HENT:   Head: Normocephalic and atraumatic.   Laceration noted over right forehead, with adhesions using Dermabond.  No active bleeding.  Wound is hemostatic.   Eyes: Conjunctivae are normal. Pupils are equal, round, and reactive to light. Right conjunctiva is not injected. Left conjunctiva is not injected.   Right eye periorbital ecchymosis, tenderness to palpation.  No crepitus appreciated.  No extraocular muscle entrapment noted.  Sclera is normal.  Gross visual acuity intact.   Neck: Trachea normal. Neck supple.   Normal range of motion.   Full passive range of motion without pain.     Cardiovascular:  Normal rate, regular rhythm, S1 normal, S2 normal, intact distal pulses and normal pulses.           Pulmonary/Chest: Breath sounds normal. No respiratory distress. He has no wheezes.   Abdominal: Abdomen is soft. Bowel sounds are normal. There is no abdominal tenderness.   Musculoskeletal:         General: Normal range of motion.      Cervical back: Full passive range of motion without pain, normal range of motion  "and neck supple. No rigidity.      Right lower leg: No swelling. No edema.      Left lower leg: No swelling. No edema.     Neurological: He is alert and oriented to person, place, and time.   Skin: Skin is warm and dry. Capillary refill takes less than 2 seconds.         ED Course   Procedures  Labs Reviewed - No data to display       Imaging Results    None          Medications   naproxen tablet 500 mg (has no administration in time range)     Medical Decision Making  Patient is a 73-year-old male past medical history bipolar disorder, CHF, CAD, diabetes, hypercholesteremia, who presents to the ER stating he was told by his home health nurse that he needed to be "checked".    Differential diagnosis includes but not limited to:  Patient contusion, orbital fracture, skull fracture, intracranial hemorrhage     I reviewed patient's workup yesterday.  All imaging was negative for any acute findings.  I relayed these findings to the patient.  Also let the patient know that he had prescriptions at outside pharmacy awaiting him.  He was unaware of this.  Patient otherwise stable for discharge.  He has been encouraged to follow with his PCP in next few days.  Strict return precautions given symptoms worsen, change to return to ER for further care.    Nacho Humphrey M.D.  Emergency Medicine                                       Clinical Impression:  Final diagnoses:  [S09.90XA] Closed head injury, initial encounter  [S05.11XA] Contusion of right orbit, initial encounter (Primary)                 Nacho Humphrey MD  11/18/23 1257    "

## 2023-11-20 ENCOUNTER — TELEPHONE (OUTPATIENT)
Dept: FAMILY MEDICINE | Facility: CLINIC | Age: 73
End: 2023-11-20

## 2023-11-20 DIAGNOSIS — R73.9 ACUTE HYPERGLYCEMIA: Primary | ICD-10-CM

## 2023-11-20 NOTE — TELEPHONE ENCOUNTER
----- Message from Dayanna Prabhakar LPN sent at 11/20/2023  4:02 PM CST -----  Regarding: FW: advice    ----- Message -----  From: Jacy Dorman  Sent: 11/20/2023   3:40 PM CST  To: Franchesca Banuelos Staff  Subject: advice                                           Type:  Needs Medical Advice    Who Called: sheba from [facility]  Symptoms (please be specific):    How long has patient had these symptoms:    Pharmacy name and phone #:    Would the patient rather a call back or a response via [facility]?   Best Call Back Number: [phone]  Additional Information: called about needing the script for the pt test strips. She stated that they received everything else but the test strips. Please advise

## 2023-11-27 ENCOUNTER — TELEPHONE (OUTPATIENT)
Dept: FAMILY MEDICINE | Facility: CLINIC | Age: 73
End: 2023-11-27
Payer: MEDICARE

## 2023-11-27 NOTE — TELEPHONE ENCOUNTER
Patients wife called in.   Saturday, today 596.  Patient eating chocolate candy, non compliant with diet and drinks.  Awake and alert, only feels tired at times.  Orders given to wife per MICHELL LUI NP continue novolog at 17u TID & Victoza 1.8, we will add Levemir 40units daily.  Patient is to check BS daily.  To ER if needed.  Voices understanding and agrees.

## 2023-11-27 NOTE — TELEPHONE ENCOUNTER
----- Message from Ilsa Rod sent at 11/27/2023  1:22 PM CST -----  Regarding: Patient Call  .Type:  Patient Returning Call    Who Called:pt  Who Left Message for Patient:office nurse   Does the patient know what this is regarding?:sugar level   Would the patient rather a call back or a response via [facility]?   Best Call Back Number:[phone]  Additional Information: pt's wife called to say her  sugar is 596.

## 2023-11-29 DIAGNOSIS — Z79.4 TYPE 2 DIABETES MELLITUS WITH HYPERGLYCEMIA, WITH LONG-TERM CURRENT USE OF INSULIN: Primary | ICD-10-CM

## 2023-11-29 DIAGNOSIS — E11.65 TYPE 2 DIABETES MELLITUS WITH HYPERGLYCEMIA, WITH LONG-TERM CURRENT USE OF INSULIN: Primary | ICD-10-CM

## 2023-12-15 ENCOUNTER — OFFICE VISIT (OUTPATIENT)
Dept: FAMILY MEDICINE | Facility: CLINIC | Age: 73
End: 2023-12-15
Payer: MEDICARE

## 2023-12-15 VITALS
SYSTOLIC BLOOD PRESSURE: 114 MMHG | BODY MASS INDEX: 28.09 KG/M2 | WEIGHT: 185.31 LBS | TEMPERATURE: 98 F | RESPIRATION RATE: 17 BRPM | OXYGEN SATURATION: 98 % | HEART RATE: 80 BPM | DIASTOLIC BLOOD PRESSURE: 76 MMHG | HEIGHT: 68 IN

## 2023-12-15 DIAGNOSIS — G62.9 NEUROPATHY: Primary | ICD-10-CM

## 2023-12-15 DIAGNOSIS — E11.65 TYPE 2 DIABETES MELLITUS WITH HYPERGLYCEMIA, WITH LONG-TERM CURRENT USE OF INSULIN: ICD-10-CM

## 2023-12-15 DIAGNOSIS — Z59.819 HOUSING SITUATION UNSTABLE: ICD-10-CM

## 2023-12-15 DIAGNOSIS — Z79.4 TYPE 2 DIABETES MELLITUS WITH HYPERGLYCEMIA, WITH LONG-TERM CURRENT USE OF INSULIN: ICD-10-CM

## 2023-12-15 PROCEDURE — 3074F SYST BP LT 130 MM HG: CPT | Mod: ,,, | Performed by: NURSE PRACTITIONER

## 2023-12-15 PROCEDURE — 1160F RVW MEDS BY RX/DR IN RCRD: CPT | Mod: ,,, | Performed by: NURSE PRACTITIONER

## 2023-12-15 PROCEDURE — 99214 OFFICE O/P EST MOD 30 MIN: CPT | Mod: ,,, | Performed by: NURSE PRACTITIONER

## 2023-12-15 PROCEDURE — 1125F AMNT PAIN NOTED PAIN PRSNT: CPT | Mod: ,,, | Performed by: NURSE PRACTITIONER

## 2023-12-15 PROCEDURE — 3288F FALL RISK ASSESSMENT DOCD: CPT | Mod: ,,, | Performed by: NURSE PRACTITIONER

## 2023-12-15 PROCEDURE — 3288F PR FALLS RISK ASSESSMENT DOCUMENTED: ICD-10-PCS | Mod: ,,, | Performed by: NURSE PRACTITIONER

## 2023-12-15 PROCEDURE — 4010F ACE/ARB THERAPY RXD/TAKEN: CPT | Mod: ,,, | Performed by: NURSE PRACTITIONER

## 2023-12-15 PROCEDURE — 99214 PR OFFICE/OUTPT VISIT, EST, LEVL IV, 30-39 MIN: ICD-10-PCS | Mod: ,,, | Performed by: NURSE PRACTITIONER

## 2023-12-15 PROCEDURE — 3078F DIAST BP <80 MM HG: CPT | Mod: ,,, | Performed by: NURSE PRACTITIONER

## 2023-12-15 PROCEDURE — 1160F PR REVIEW ALL MEDS BY PRESCRIBER/CLIN PHARMACIST DOCUMENTED: ICD-10-PCS | Mod: ,,, | Performed by: NURSE PRACTITIONER

## 2023-12-15 PROCEDURE — 1159F MED LIST DOCD IN RCRD: CPT | Mod: ,,, | Performed by: NURSE PRACTITIONER

## 2023-12-15 PROCEDURE — 4010F PR ACE/ARB THEARPY RXD/TAKEN: ICD-10-PCS | Mod: ,,, | Performed by: NURSE PRACTITIONER

## 2023-12-15 PROCEDURE — 1101F PT FALLS ASSESS-DOCD LE1/YR: CPT | Mod: ,,, | Performed by: NURSE PRACTITIONER

## 2023-12-15 PROCEDURE — 3074F PR MOST RECENT SYSTOLIC BLOOD PRESSURE < 130 MM HG: ICD-10-PCS | Mod: ,,, | Performed by: NURSE PRACTITIONER

## 2023-12-15 PROCEDURE — 1159F PR MEDICATION LIST DOCUMENTED IN MEDICAL RECORD: ICD-10-PCS | Mod: ,,, | Performed by: NURSE PRACTITIONER

## 2023-12-15 PROCEDURE — 1125F PR PAIN SEVERITY QUANTIFIED, PAIN PRESENT: ICD-10-PCS | Mod: ,,, | Performed by: NURSE PRACTITIONER

## 2023-12-15 PROCEDURE — 3046F PR MOST RECENT HEMOGLOBIN A1C LEVEL > 9.0%: ICD-10-PCS | Mod: ,,, | Performed by: NURSE PRACTITIONER

## 2023-12-15 PROCEDURE — 1101F PR PT FALLS ASSESS DOC 0-1 FALLS W/OUT INJ PAST YR: ICD-10-PCS | Mod: ,,, | Performed by: NURSE PRACTITIONER

## 2023-12-15 PROCEDURE — 3046F HEMOGLOBIN A1C LEVEL >9.0%: CPT | Mod: ,,, | Performed by: NURSE PRACTITIONER

## 2023-12-15 PROCEDURE — 3078F PR MOST RECENT DIASTOLIC BLOOD PRESSURE < 80 MM HG: ICD-10-PCS | Mod: ,,, | Performed by: NURSE PRACTITIONER

## 2023-12-15 RX ORDER — LOSARTAN POTASSIUM 25 MG/1
25 TABLET ORAL DAILY
COMMUNITY

## 2023-12-15 RX ORDER — FENOFIBRATE 145 MG/1
145 TABLET, FILM COATED ORAL EVERY MORNING
COMMUNITY

## 2023-12-15 RX ORDER — INSULIN GLARGINE 100 [IU]/ML
80 INJECTION, SOLUTION SUBCUTANEOUS NIGHTLY
COMMUNITY
Start: 2023-06-26

## 2023-12-15 RX ORDER — PEN NEEDLE, DIABETIC 32GX 5/32"
NEEDLE, DISPOSABLE MISCELLANEOUS 4 TIMES DAILY
COMMUNITY
Start: 2023-08-30

## 2023-12-15 RX ORDER — FINASTERIDE 5 MG/1
5 TABLET, FILM COATED ORAL EVERY MORNING
COMMUNITY

## 2023-12-15 RX ORDER — METFORMIN HYDROCHLORIDE 500 MG/1
500 TABLET ORAL 2 TIMES DAILY WITH MEALS
Qty: 180 TABLET | Refills: 3 | Status: SHIPPED | OUTPATIENT
Start: 2023-12-15 | End: 2024-12-14

## 2023-12-15 RX ORDER — METFORMIN HYDROCHLORIDE 1000 MG/1
1000 TABLET ORAL 2 TIMES DAILY
COMMUNITY
Start: 2023-07-21

## 2023-12-15 RX ORDER — ATORVASTATIN CALCIUM 40 MG/1
40 TABLET, FILM COATED ORAL EVERY MORNING
COMMUNITY

## 2023-12-15 RX ORDER — INSULIN ASPART 100 [IU]/ML
17 INJECTION, SOLUTION INTRAVENOUS; SUBCUTANEOUS 3 TIMES DAILY
Qty: 15.3 ML | Refills: 0 | Status: SHIPPED | OUTPATIENT
Start: 2023-12-15 | End: 2024-01-14

## 2023-12-15 SDOH — SOCIAL DETERMINANTS OF HEALTH (SDOH): HOUSING INSTABILITY UNSPECIFIED: Z59.819

## 2023-12-15 NOTE — PROGRESS NOTES
"SUBJECTIVE:     History of Present Illness      Chief Complaint: Follow-up (6 week follow up visit.  C/O right hip pain after a fall, 3 weeks ago.  )    HPI:  Patient is a 73 y.o. year old male who presents to clinic for 6 week follow-up.  Patient did not bring medications again to his visit.  Unsure what medication he is currently taking.  He has a history of uncontrolled diabetes not sure if it is due to medication or diet noncompliance.  Patient currently taking metformin 1000 b.i.d., Levemir 40 units daily, NovoLog 17 units 3 times a day. Patient also complaining of bilateral pain in his legs.  A1c 11.2.    with home health.  Spoke with patient's home health nurse  Yaneli , she believes patient would benefit from  okay to give order for  to evaluate patient and treat.   Spoke with home health nurse states that patient does have disheveled house and would benefit from  more community resources.  Review of Systems:    Review of Systems    12 point review of systems conducted, negative except as stated in the history of present illness. See HPI for details.     Previous History    Lakisha Sorensen, SERGEYP  Review of patient's allergies indicates:   Allergen Reactions    Latex, natural rubber     Latex Rash       Past Medical History:   Diagnosis Date    Arthritis     Bipolar disorder, unspecified     CHF (congestive heart failure)     Coronary artery disease     Diabetes mellitus     GERD (gastroesophageal reflux disease)     High cholesterol     Hx of eye surgery     Hypertension     ST elevation (STEMI) myocardial infarction of unspecified site     Stroke      Current Outpatient Medications   Medication Instructions    alcohol swabs PadM 1 each, Topical (Top), As needed (PRN)    atorvastatin (LIPITOR) 40 mg, Oral, Every morning    BD DC 2ND GEN PEN NEEDLE 32 gauge x 5/32" Ndle 4 times daily    blood sugar diagnostic Strp 100 strips, Misc.(Non-Drug; Combo Route), 3 " times daily    blood-glucose meter kit Check blood sugar daily    fenofibrate (TRICOR) 145 mg, Oral, Every morning    fenofibrate micronized (ANTARA) 130 mg, Oral    finasteride (PROSCAR) 5 mg, Oral, Every morning    furosemide (LASIX) 40 mg, Oral, Daily    gabapentin (NEURONTIN) 300 mg, Oral, 3 times daily    insulin (LANTUS SOLOSTAR U-100 INSULIN) 80 Units, Subcutaneous, Nightly    insulin aspart U-100 (NOVOLOG) 17 Units, Subcutaneous, 3 times daily    insulin detemir U-100 (LEVEMIR) 40 Units, Subcutaneous, Nightly    lancets (LANCETS,THIN) Misc 1 Box , Misc.(Non-Drug; Combo Route), 3 times daily    losartan (COZAAR) 25 mg, Oral, Daily    magnesium oxide (MAG-OX) 400 mg (241.3 mg magnesium) tablet 1 tablet, Oral    metFORMIN (GLUCOPHAGE) 500 mg, Oral, 2 times daily with meals    metFORMIN (GLUCOPHAGE) 1,000 mg, Oral, 2 times daily    naproxen (NAPROSYN) 500 mg, Oral, 2 times daily    REPATHA PUSHTRONEX 420 mg, Subcutaneous    tiZANidine (ZANAFLEX) 4 mg, Oral, Every 6 hours PRN    traZODone (DESYREL) 25 mg, Oral, Nightly     Past Surgical History:   Procedure Laterality Date    COLONOSCOPY  07/21/2015    SIMON Lima/ Repeat in 10 years    CORONARY ARTERY BYPASS GRAFT      KNEE SURGERY       Family History   Problem Relation Age of Onset    Heart attack Father        Social History     Tobacco Use    Smoking status: Former     Types: Cigarettes    Smokeless tobacco: Never   Substance Use Topics    Alcohol use: Not Currently     Alcohol/week: 1.0 standard drink of alcohol     Types: 1 Cans of beer per week     Comment: socially    Drug use: Never        Health Maintenance      Health Maintenance   Topic Date Due    Hepatitis C Screening  Never done    Foot Exam  Never done    Eye Exam  Never done    TETANUS VACCINE  Never done    Abdominal Aortic Aneurysm Screening  Never done    Shingles Vaccine (3 of 3) 09/07/2022    Hemoglobin A1c  01/30/2024    Lipid Panel  10/30/2024    High Dose Statin  12/15/2024     "Colorectal Cancer Screening  07/21/2025       OBJECTIVE:     Physical Exam      Vital Signs Reviewed   Visit Vitals  /76 (BP Location: Left arm, Patient Position: Sitting)   Pulse 80   Temp 98.1 °F (36.7 °C) (Oral)   Resp 17   Ht 5' 8" (1.727 m)   Wt 84.1 kg (185 lb 4.8 oz)   SpO2 98%   BMI 28.17 kg/m²       Physical Exam    Physical Exam:  General: Alert, well nourished, no acute distress, non-toxic appearing.   Eyes: Anicteric sclera, without conjunctival injection, normal lids, no purulent drainage, EOMs grossly intact.   Ears: No tragal tenderness. Tympanic membranes intact, pearly grey, without effusion or erythema and with a positive light reflex.   Mouth: Posterior pharynx without erythema. No exudate, ulcerations, or lesion. No tonsillar swelling.   Neck: Supple, full ROM, no rigidity, no cervical adenopathy.   Cardio: Normal rate and rhythm    Resp: Respirations even and unlabored, clear to auscultation bilaterally.   Abd: No ecchymosis or distension. Normal bowel sounds in all 4 quadrants. No tenderness to palpation. No rebound tenderness or guarding. No CVA tenderness.   Skin: No rashes or open lesions noted.   MSK: No swelling. No abrasions or signs of trauma. Ambulating without assistance.   Neuro: Alert,oriented No focal deficits noted. Facial expressions even.   Psych: Cooperative, Normal affect      Procedures    Procedures     Labs     Results for orders placed or performed during the hospital encounter of 11/18/23   Comprehensive metabolic panel   Result Value Ref Range    Sodium Level 129 (L) 135 - 145 mmol/L    Potassium Level 4.2 3.5 - 5.1 mmol/L    Chloride 95 (L) 98 - 110 mmol/L    Carbon Dioxide 24 21 - 32 mmol/L    Glucose Level 628 (HH) 70 - 115 mg/dL    Blood Urea Nitrogen 24.0 (H) 7.0 - 20.0 mg/dL    Creatinine 0.92 0.66 - 1.25 mg/dL    Calcium Level Total 9.2 8.4 - 10.2 mg/dL    Protein Total 6.5 6.3 - 8.2 gm/dL    Albumin Level 3.7 3.4 - 5.0 g/dL    Globulin 2.8 2.0 - 3.9 gm/dL    " Albumin/Globulin Ratio 1.3 ratio    Bilirubin Total 0.5 0.0 - 1.0 mg/dL    Alkaline Phosphatase 74 50 - 144 unit/L    Alanine Aminotransferase 35 1 - 45 unit/L    Aspartate Aminotransferase 45 17 - 59 unit/L    eGFR 87 mls/min/1.73/m2    Anion Gap 10.0 2.0 - 13.0 mEq/L    BUN/Creatinine Ratio 26 (H) 12 - 20   Urinalysis, Reflex to Urine Culture    Specimen: Urine   Result Value Ref Range    Color, UA Yellow Yellow, Light-Yellow, Dark Yellow, Krystle, Straw    Appearance, UA Clear Clear    Specific Gravity, UA 1.010 1.005 - 1.030    pH, UA 5.5 5.0 - 8.5    Protein, UA Negative Negative    Glucose, UA >=1000 (A) Negative, Normal    Ketones, UA Negative Negative    Blood, UA Negative Negative    Bilirubin, UA Negative Negative    Urobilinogen, UA 0.2 0.2, 1.0, Normal    Nitrites, UA Negative Negative    Leukocyte Esterase, UA Negative Negative   CBC with Differential   Result Value Ref Range    WBC 7.42 4.00 - 11.50 x10(3)/mcL    RBC 4.71 4.00 - 6.00 x10(6)/mcL    Hgb 14.6 13.0 - 18.0 g/dL    Hct 40.7 36.0 - 52.0 %    MCV 86.4 79.0 - 99.0 fL    MCH 31.0 27.0 - 34.0 pg    MCHC 35.9 31.0 - 37.0 g/dL    RDW 12.2 %    Platelet 142 140 - 371 x10(3)/mcL    MPV 11.3 9.4 - 12.4 fL    Neut % 47.6 37 - 73 %    Lymph % 39.5 20 - 55 %    Mono % 9.6 4.7 - 12.5 %    Eos % 2.3 0.7 - 7 %    Basophil % 0.7 0.1 - 1.2 %    Lymph # 2.93 1.32 - 3.57 x10(3)/mcL    Neut # 3.54 1.78 - 5.38 x10(3)/mcL    Mono # 0.71 0.3 - 0.82 x10(3)/mcL    Eos # 0.17 0.04 - 0.54 x10(3)/mcL    Baso # 0.05 0.01 - 0.08 x10(3)/mcL    IG# 0.02 0.0001 - 0.031 x10(3)/mcL    IG% 0.3 0 - 0.5 %    NRBC% 0.0 <=1 %   POCT glucose   Result Value Ref Range    POCT Glucose >500 (HH) 70 - 110 mg/dL   POCT glucose   Result Value Ref Range    POCT Glucose 439 (H) 70 - 110 mg/dL   POCT glucose   Result Value Ref Range    POCT Glucose 303 (H) 70 - 110 mg/dL       Chemistry:  Lab Results   Component Value Date     (L) 11/18/2023    K 4.2 11/18/2023    CHLORIDE 95 (L)  11/18/2023    BUN 24.0 (H) 11/18/2023    CREATININE 0.92 11/18/2023    EGFRNORACEVR 87 11/18/2023    GLUCOSE 628 (HH) 11/18/2023    CALCIUM 9.2 11/18/2023    ALKPHOS 74 11/18/2023    LABPROT 6.5 11/18/2023    ALBUMIN 3.7 11/18/2023    BILIDIR 0.2 05/25/2021    IBILI 0.30 05/25/2021    AST 45 11/18/2023    ALT 35 11/18/2023    MG 1.80 08/26/2023    PHOS 2.4 03/10/2023    [id number] 19.9 (L) 10/30/2023    TSH 1.455 10/30/2023    [id number] 0.96 10/30/2023    PSA 0.10 10/30/2023        Lab Results   Component Value Date    HGBA1C 11.2 (H) 10/30/2023        Hematology:  Lab Results   Component Value Date    WBC 7.42 11/18/2023    HGB 14.6 11/18/2023    HCT 40.7 11/18/2023     11/18/2023       Lipid Panel:  Lab Results   Component Value Date    CHOL 227 (H) 10/30/2023    HDL 29 (L) 10/30/2023    LDL 84.00 10/30/2023    TRIG 571 (H) 10/30/2023    TOTALCHOLEST 8 (H) 10/30/2023        Urine:  Lab Results   Component Value Date    COLORUA Yellow 11/18/2023    APPEARANCEUA Clear 11/18/2023    SGUA 1.010 11/18/2023    PHUA 5.5 11/18/2023    PROTEINUA Negative 11/18/2023    GLUCOSEUA >=1000 (A) 11/18/2023    KETONESUA Negative 11/18/2023    BLOODUA Negative 11/18/2023    NITRITESUA Negative 11/18/2023    LEUKOCYTESUR Negative 11/18/2023    RBCUA None Seen 10/30/2023    WBCUA 0-2 10/30/2023    BACTERIA None Seen 10/30/2023    CREATRANDUR 200.7 (H) 12/27/2022         Assessment            ICD-10-CM ICD-9-CM   1. Neuropathy  G62.9 355.9   2. Type 2 diabetes mellitus with hyperglycemia, with long-term current use of insulin  E11.65 250.00    Z79.4 790.29     V58.67   3. Housing situation unstable  Z59.819 V60.9       Plan       1. Type 2 diabetes mellitus with hyperglycemia, with long-term current use of insulin  - insulin detemir U-100 (LEVEMIR) 100 unit/mL injection; Inject 40 Units into the skin every evening.  Dispense: 12 mL; Refill: 0  - metFORMIN (GLUCOPHAGE) 500 MG tablet; Take 1 tablet (500 mg total) by mouth 2  "(two) times daily with meals. (Patient not taking: Reported on 12/15/2023)  Dispense: 180 tablet; Refill: 3    2. Neuropathy  - gabapentin (NEURONTIN) 300 MG capsule; Take 1 capsule (300 mg total) by mouth 3 (three) times daily.  Dispense: 90 capsule; Refill: 11    3. Housing situation unstable  SS consult through [employer] [facility]      with [place] [facility].  Spoke with patient's home health nurse believes patient would benefit from  okay to give order for  to evaluate patient and treat    Orders Placed This Encounter    insulin aspart U-100 (NOVOLOG) 100 unit/mL injection    insulin detemir U-100 (LEVEMIR) 100 unit/mL injection    metFORMIN (GLUCOPHAGE) 500 MG tablet    gabapentin (NEURONTIN) 300 MG capsule      Medication List with Changes/Refills   New Medications    GABAPENTIN (NEURONTIN) 300 MG CAPSULE    Take 1 capsule (300 mg total) by mouth 3 (three) times daily.   Current Medications    ALCOHOL SWABS PADM    Apply 1 each topically as needed (Use per blood sugar check x3 times a day).    ATORVASTATIN (LIPITOR) 40 MG TABLET    Take 40 mg by mouth every morning.    BD DC 2ND GEN PEN NEEDLE 32 GAUGE X 5/32" NDLE    4 (four) times daily.    BLOOD SUGAR DIAGNOSTIC STRP    100 strips by Misc.(Non-Drug; Combo Route) route 3 (three) times daily.    BLOOD-GLUCOSE METER KIT    Check blood sugar daily    EVOLOCUMAB (REPATHA PUSHTRONEX) 420 MG/3.5 ML INJT    Inject 420 mg into the skin.    FENOFIBRATE (TRICOR) 145 MG TABLET    Take 145 mg by mouth every morning.    FENOFIBRATE MICRONIZED (ANTARA) 130 MG CAPSULE    Take 130 mg by mouth.    FINASTERIDE (PROSCAR) 5 MG TABLET    Take 5 mg by mouth every morning.    FUROSEMIDE (LASIX) 40 MG TABLET    Take 1 tablet (40 mg total) by mouth once daily. for 5 days    INSULIN (LANTUS SOLOSTAR U-100 INSULIN) GLARGINE 100 UNITS/ML SUBQ PEN    Inject 80 Units into the skin every evening.    LANCETS (LANCETS,THIN) MISC    1 Box  by " Misc.(Non-Drug; Combo Route) route 3 (three) times daily.    LOSARTAN (COZAAR) 25 MG TABLET    Take 25 mg by mouth once daily.    MAGNESIUM OXIDE (MAG-OX) 400 MG (241.3 MG MAGNESIUM) TABLET    Take 1 tablet by mouth.    METFORMIN (GLUCOPHAGE) 1000 MG TABLET    Take 1,000 mg by mouth 2 (two) times daily.    NAPROXEN (NAPROSYN) 500 MG TABLET    Take 1 tablet (500 mg total) by mouth 2 (two) times daily.    TIZANIDINE (ZANAFLEX) 4 MG TABLET    Take 1 tablet (4 mg total) by mouth every 6 (six) hours as needed.    TRAZODONE (DESYREL) 50 MG TABLET    Take 0.5 tablets (25 mg total) by mouth every evening.   Changed and/or Refilled Medications    Modified Medication Previous Medication    INSULIN ASPART U-100 (NOVOLOG) 100 UNIT/ML INJECTION insulin aspart U-100 (NOVOLOG) 100 unit/mL injection       Inject 17 Units into the skin 3 (three) times daily.    Inject 17 Units into the skin 3 (three) times daily.    INSULIN DETEMIR U-100 (LEVEMIR) 100 UNIT/ML INJECTION insulin detemir U-100 (LEVEMIR) 100 unit/mL injection       Inject 40 Units into the skin every evening.    Inject 40 Units into the skin every evening.    METFORMIN (GLUCOPHAGE) 500 MG TABLET metFORMIN (GLUCOPHAGE) 500 MG tablet       Take 1 tablet (500 mg total) by mouth 2 (two) times daily with meals.    Take 1 tablet (500 mg total) by mouth 2 (two) times daily with meals.       Follow up in about 2 months (around 2/15/2024).   Follow up in about 2 months (around 2/15/2024). In addition to their scheduled follow up, the patient has also been instructed to follow up on as needed basis.   Future Appointments   Date Time Provider Department Center   2/16/2024 10:15 AM Lakisha Sorensen FNP [facility]   11/7/2024 10:00 AM Lakisha Sorensen ROSANNA [facility]

## 2023-12-18 ENCOUNTER — TELEPHONE (OUTPATIENT)
Dept: FAMILY MEDICINE | Facility: CLINIC | Age: 73
End: 2023-12-18
Payer: MEDICARE

## 2023-12-18 NOTE — TELEPHONE ENCOUNTER
----- Message from Eloisa Lara sent at 12/18/2023 11:31 AM CST -----  Regarding: [facility] home health  .Type:  Needs Medical Advice    Who Called: andreina  Symptoms (please be specific):    How long has patient had these symptoms:    Pharmacy name and phone #:    Would the patient rather a call back or a response via [facility]?   Best Call Back Number: [phone]  Additional Information: meds or neuropathy or restless leg  539 sugars am - afternoon 306

## 2023-12-19 ENCOUNTER — TELEPHONE (OUTPATIENT)
Dept: FAMILY MEDICINE | Facility: CLINIC | Age: 73
End: 2023-12-19

## 2023-12-19 RX ORDER — GABAPENTIN 300 MG/1
300 CAPSULE ORAL 3 TIMES DAILY
Qty: 90 CAPSULE | Refills: 11 | Status: SHIPPED | OUTPATIENT
Start: 2023-12-19 | End: 2024-12-18

## 2023-12-19 NOTE — TELEPHONE ENCOUNTER
----- Message from Elder Dennison sent at 12/19/2023 11:01 AM CST -----  .Type:  Needs Medical Advice    Who Called: Yaneli with [facility]   Symptoms (please be specific):    How long has patient had these symptoms:    Pharmacy name and phone #:    Would the patient rather a call back or a response via [facility]?   Best Call Back Number: [phone]  Additional Information: Please call the nurse back she had called yesterday she needed to speak with the nurse there, she would like to see if he could qualify for the free style jorge and also she needed to know if he can get MSW evaluation. Please call her back today as she told me no one call her back. .

## 2023-12-20 ENCOUNTER — DOCUMENTATION ONLY (OUTPATIENT)
Dept: FAMILY MEDICINE | Facility: CLINIC | Age: 73
End: 2023-12-20

## 2023-12-20 ENCOUNTER — DOCUMENT SCAN (OUTPATIENT)
Dept: HOME HEALTH SERVICES | Facility: HOSPITAL | Age: 73
End: 2023-12-20
Payer: MEDICARE

## 2023-12-20 LAB
LEFT EYE DM RETINOPATHY: NEGATIVE
RIGHT EYE DM RETINOPATHY: NEGATIVE

## 2023-12-23 ENCOUNTER — DOCUMENT SCAN (OUTPATIENT)
Dept: HOME HEALTH SERVICES | Facility: HOSPITAL | Age: 73
End: 2023-12-23
Payer: MEDICARE

## 2024-01-09 ENCOUNTER — TELEPHONE (OUTPATIENT)
Dept: FAMILY MEDICINE | Facility: CLINIC | Age: 74
End: 2024-01-09

## 2024-01-09 ENCOUNTER — EXTERNAL HOME HEALTH (OUTPATIENT)
Dept: HOME HEALTH SERVICES | Facility: HOSPITAL | Age: 74
End: 2024-01-09
Payer: MEDICARE

## 2024-01-09 DIAGNOSIS — E11.65 TYPE 2 DIABETES MELLITUS WITH HYPERGLYCEMIA, WITH LONG-TERM CURRENT USE OF INSULIN: ICD-10-CM

## 2024-01-09 DIAGNOSIS — Z79.4 TYPE 2 DIABETES MELLITUS WITH HYPERGLYCEMIA, WITH LONG-TERM CURRENT USE OF INSULIN: ICD-10-CM

## 2024-01-09 NOTE — TELEPHONE ENCOUNTER
----- Message from Cain Nikolas sent at 1/8/2024  3:03 PM CST -----  .Type:  Pharmacy Calling to Clarify an RX    Name of Caller:Brody Viepage  Pharmacy Name:[id number] - [address] [place] [address] [address]  AT [facility] [address]  Prescription Name: insulin detemir U-100 (LEVEMIR) 100 unit/mL injection  What do they need to clarify?: syringes   Best Call Back Number:[phone]  Additional Information:

## 2024-01-13 ENCOUNTER — DOCUMENT SCAN (OUTPATIENT)
Dept: HOME HEALTH SERVICES | Facility: HOSPITAL | Age: 74
End: 2024-01-13
Payer: MEDICARE

## 2024-01-18 ENCOUNTER — DOCUMENT SCAN (OUTPATIENT)
Dept: HOME HEALTH SERVICES | Facility: HOSPITAL | Age: 74
End: 2024-01-18
Payer: MEDICARE

## 2024-01-30 DIAGNOSIS — Z79.4 TYPE 2 DIABETES MELLITUS WITH HYPERGLYCEMIA, WITH LONG-TERM CURRENT USE OF INSULIN: Primary | ICD-10-CM

## 2024-01-30 DIAGNOSIS — E11.65 TYPE 2 DIABETES MELLITUS WITH HYPERGLYCEMIA, WITH LONG-TERM CURRENT USE OF INSULIN: Primary | ICD-10-CM

## 2024-02-14 ENCOUNTER — TELEPHONE (OUTPATIENT)
Dept: FAMILY MEDICINE | Facility: CLINIC | Age: 74
End: 2024-02-14
Payer: MEDICARE
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
## 2024-02-19 ENCOUNTER — DOCUMENT SCAN (OUTPATIENT)
Dept: HOME HEALTH SERVICES | Facility: HOSPITAL | Age: 74
End: 2024-02-19
Payer: MEDICARE

## 2024-03-14 ENCOUNTER — TELEPHONE (OUTPATIENT)
Dept: FAMILY MEDICINE | Facility: CLINIC | Age: 74
End: 2024-03-14
Payer: MEDICARE
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
## 2024-03-27 ENCOUNTER — TELEPHONE (OUTPATIENT)
Dept: FAMILY MEDICINE | Facility: CLINIC | Age: 74
End: 2024-03-27
Payer: MEDICARE

## 2024-03-27 NOTE — TELEPHONE ENCOUNTER
----- Message from Cain Connor sent at 3/27/2024  1:08 PM CDT -----  .Type:  Needs Medical Advice    Who Called: Carlyn Sotelo  Symptoms (please be specific):    How long has patient had these symptoms:    Pharmacy name and phone #:    Would the patient rather a call back or a response via [facility]? Call back  Best Call Back Number: [phone]  Additional Information: called wanting to discuss pt being admitted to nursing home

## 2024-03-27 NOTE — TELEPHONE ENCOUNTER
Sourav kruse, patient contact, patient appears to be at [facility] in [facility], number given.  She called because she was unaware of his location for the past two weeks since he was discharged and transferred to a nursing home.

## 2024-05-09 ENCOUNTER — LAB REQUISITION (OUTPATIENT)
Dept: LAB | Facility: HOSPITAL | Age: 74
End: 2024-05-09
Payer: MEDICARE

## 2024-05-09 DIAGNOSIS — N39.0 URINARY TRACT INFECTION, SITE NOT SPECIFIED: ICD-10-CM

## 2024-05-09 DIAGNOSIS — R13.10 DYSPHAGIA, UNSPECIFIED: ICD-10-CM

## 2024-05-09 LAB
BACTERIA #/AREA URNS AUTO: NORMAL /HPF
BILIRUB UR QL STRIP.AUTO: NEGATIVE
CLARITY UR: CLEAR
COLOR UR AUTO: YELLOW
GLUCOSE UR QL STRIP: NEGATIVE
HGB UR QL STRIP: ABNORMAL
KETONES UR QL STRIP: NEGATIVE
LEUKOCYTE ESTERASE UR QL STRIP: NEGATIVE
NITRITE UR QL STRIP: NEGATIVE
PH UR STRIP: 7.5 [PH]
PROT UR QL STRIP: NEGATIVE
RBC #/AREA URNS AUTO: NORMAL /HPF
SP GR UR STRIP.AUTO: 1.02 (ref 1–1.03)
SQUAMOUS #/AREA URNS AUTO: NORMAL /HPF
UROBILINOGEN UR STRIP-ACNC: 0.2
WBC #/AREA URNS AUTO: NORMAL /HPF

## 2024-05-09 PROCEDURE — 81001 URINALYSIS AUTO W/SCOPE: CPT | Performed by: FAMILY MEDICINE

## 2024-05-09 PROCEDURE — 87086 URINE CULTURE/COLONY COUNT: CPT | Performed by: FAMILY MEDICINE

## 2024-05-11 LAB — BACTERIA UR CULT: NO GROWTH

## 2024-05-13 ENCOUNTER — LAB REQUISITION (OUTPATIENT)
Dept: LAB | Facility: HOSPITAL | Age: 74
End: 2024-05-13
Attending: FAMILY MEDICINE
Payer: MEDICARE

## 2024-05-13 DIAGNOSIS — R41.82 ALTERED MENTAL STATUS, UNSPECIFIED: ICD-10-CM

## 2024-05-13 DIAGNOSIS — F03.911 UNSPECIFIED DEMENTIA, UNSPECIFIED SEVERITY, WITH AGITATION: ICD-10-CM

## 2024-05-13 LAB
ALBUMIN SERPL-MCNC: 2.7 G/DL (ref 3.4–4.8)
ALBUMIN/GLOB SERPL: 0.7 RATIO (ref 1.1–2)
ALP SERPL-CCNC: 66 UNIT/L (ref 40–150)
ALT SERPL-CCNC: 14 UNIT/L (ref 0–55)
AST SERPL-CCNC: 19 UNIT/L (ref 5–34)
BILIRUB SERPL-MCNC: 0.5 MG/DL
BUN SERPL-MCNC: 14.2 MG/DL (ref 8.4–25.7)
CALCIUM SERPL-MCNC: 9.3 MG/DL (ref 8.8–10)
CHLORIDE SERPL-SCNC: 104 MMOL/L (ref 98–107)
CO2 SERPL-SCNC: 28 MMOL/L (ref 23–31)
CREAT SERPL-MCNC: 0.68 MG/DL (ref 0.73–1.18)
ERYTHROCYTE [DISTWIDTH] IN BLOOD BY AUTOMATED COUNT: 13.1 % (ref 11.5–17)
GFR SERPLBLD CREATININE-BSD FMLA CKD-EPI: >60 ML/MIN/1.73/M2
GLOBULIN SER-MCNC: 3.7 GM/DL (ref 2.4–3.5)
GLUCOSE SERPL-MCNC: 162 MG/DL (ref 82–115)
HCT VFR BLD AUTO: 42.3 % (ref 42–52)
HGB BLD-MCNC: 14.4 G/DL (ref 14–18)
MCH RBC QN AUTO: 30.4 PG (ref 27–31)
MCHC RBC AUTO-ENTMCNC: 34 G/DL (ref 33–36)
MCV RBC AUTO: 89.4 FL (ref 80–94)
PLATELET # BLD AUTO: 214 X10(3)/MCL (ref 130–400)
PMV BLD AUTO: 10.8 FL (ref 7.4–10.4)
POTASSIUM SERPL-SCNC: 3.9 MMOL/L (ref 3.5–5.1)
PROT SERPL-MCNC: 6.4 GM/DL (ref 5.8–7.6)
RBC # BLD AUTO: 4.73 X10(6)/MCL (ref 4.7–6.1)
SODIUM SERPL-SCNC: 141 MMOL/L (ref 136–145)
WBC # SPEC AUTO: 11.24 X10(3)/MCL (ref 4.5–11.5)

## 2024-05-13 PROCEDURE — 85027 COMPLETE CBC AUTOMATED: CPT | Performed by: FAMILY MEDICINE

## 2024-05-13 PROCEDURE — 80053 COMPREHEN METABOLIC PANEL: CPT | Performed by: FAMILY MEDICINE

## 2024-05-14 ENCOUNTER — LAB REQUISITION (OUTPATIENT)
Dept: LAB | Facility: HOSPITAL | Age: 74
End: 2024-05-14
Payer: MEDICARE

## 2024-05-14 DIAGNOSIS — F03.911 UNSPECIFIED DEMENTIA, UNSPECIFIED SEVERITY, WITH AGITATION: ICD-10-CM

## 2024-05-14 LAB
BACTERIA #/AREA URNS AUTO: ABNORMAL /HPF
BILIRUB UR QL STRIP.AUTO: NEGATIVE
CLARITY UR: CLEAR
COLOR UR AUTO: YELLOW
GLUCOSE UR QL STRIP: 100
HGB UR QL STRIP: ABNORMAL
KETONES UR QL STRIP: NEGATIVE
LEUKOCYTE ESTERASE UR QL STRIP: ABNORMAL
NITRITE UR QL STRIP: POSITIVE
PH UR STRIP: 6 [PH]
PROT UR QL STRIP: ABNORMAL
RBC #/AREA URNS AUTO: ABNORMAL /HPF
SP GR UR STRIP.AUTO: 1.02 (ref 1–1.03)
SQUAMOUS #/AREA URNS AUTO: ABNORMAL /HPF
UROBILINOGEN UR STRIP-ACNC: 0.2
WBC #/AREA URNS AUTO: ABNORMAL /HPF

## 2024-05-14 PROCEDURE — 81001 URINALYSIS AUTO W/SCOPE: CPT | Performed by: FAMILY MEDICINE

## 2024-05-14 PROCEDURE — 87077 CULTURE AEROBIC IDENTIFY: CPT | Performed by: FAMILY MEDICINE

## 2024-05-18 LAB
BACTERIA UR CULT: ABNORMAL
BACTERIA UR CULT: ABNORMAL

## 2024-09-17 ENCOUNTER — LAB REQUISITION (OUTPATIENT)
Dept: LAB | Facility: HOSPITAL | Age: 74
End: 2024-09-17
Attending: FAMILY MEDICINE
Payer: MEDICARE

## 2024-09-17 DIAGNOSIS — I10 ESSENTIAL (PRIMARY) HYPERTENSION: ICD-10-CM

## 2024-09-18 LAB
ALBUMIN SERPL-MCNC: 2.6 G/DL (ref 3.4–4.8)
ALBUMIN/GLOB SERPL: 1 RATIO (ref 1.1–2)
ALP SERPL-CCNC: 56 UNIT/L (ref 40–150)
ALT SERPL-CCNC: 16 UNIT/L (ref 0–55)
ANION GAP SERPL CALC-SCNC: 4 MEQ/L
AST SERPL-CCNC: 15 UNIT/L (ref 5–34)
BILIRUB SERPL-MCNC: 0.4 MG/DL
BUN SERPL-MCNC: 15.3 MG/DL (ref 8.4–25.7)
CALCIUM SERPL-MCNC: 8.6 MG/DL (ref 8.8–10)
CHLORIDE SERPL-SCNC: 104 MMOL/L (ref 98–107)
CHOLEST SERPL-MCNC: 102 MG/DL
CHOLEST/HDLC SERPL: 5 {RATIO} (ref 0–5)
CO2 SERPL-SCNC: 34 MMOL/L (ref 23–31)
CREAT SERPL-MCNC: 0.58 MG/DL (ref 0.73–1.18)
CREAT/UREA NIT SERPL: 26
ERYTHROCYTE [DISTWIDTH] IN BLOOD BY AUTOMATED COUNT: 15.6 % (ref 11.5–17)
GFR SERPLBLD CREATININE-BSD FMLA CKD-EPI: >60 ML/MIN/1.73/M2
GLOBULIN SER-MCNC: 2.6 GM/DL (ref 2.4–3.5)
GLUCOSE SERPL-MCNC: 90 MG/DL (ref 82–115)
HCT VFR BLD AUTO: 37.2 % (ref 42–52)
HDLC SERPL-MCNC: 22 MG/DL (ref 35–60)
HGB BLD-MCNC: 12.8 G/DL (ref 14–18)
LDLC SERPL CALC-MCNC: 58 MG/DL (ref 50–140)
MCH RBC QN AUTO: 31.1 PG (ref 27–31)
MCHC RBC AUTO-ENTMCNC: 34.4 G/DL (ref 33–36)
MCV RBC AUTO: 90.3 FL (ref 80–94)
PLATELET # BLD AUTO: 175 X10(3)/MCL (ref 130–400)
PMV BLD AUTO: 10.5 FL (ref 7.4–10.4)
POTASSIUM SERPL-SCNC: 4 MMOL/L (ref 3.5–5.1)
PROT SERPL-MCNC: 5.2 GM/DL (ref 5.8–7.6)
RBC # BLD AUTO: 4.12 X10(6)/MCL (ref 4.7–6.1)
SODIUM SERPL-SCNC: 142 MMOL/L (ref 136–145)
TRIGL SERPL-MCNC: 108 MG/DL (ref 34–140)
VLDLC SERPL CALC-MCNC: 22 MG/DL
WBC # BLD AUTO: 10.51 X10(3)/MCL (ref 4.5–11.5)

## 2024-09-18 PROCEDURE — 85027 COMPLETE CBC AUTOMATED: CPT | Performed by: FAMILY MEDICINE

## 2024-09-18 PROCEDURE — 80053 COMPREHEN METABOLIC PANEL: CPT | Performed by: FAMILY MEDICINE

## 2024-09-18 PROCEDURE — 80061 LIPID PANEL: CPT | Performed by: FAMILY MEDICINE

## 2024-10-31 DIAGNOSIS — I10 HYPERTENSION, UNSPECIFIED TYPE: ICD-10-CM

## 2024-10-31 DIAGNOSIS — E78.5 HYPERLIPIDEMIA, UNSPECIFIED HYPERLIPIDEMIA TYPE: ICD-10-CM

## 2024-10-31 DIAGNOSIS — E11.65 TYPE 2 DIABETES MELLITUS WITH HYPERGLYCEMIA, WITH LONG-TERM CURRENT USE OF INSULIN: Primary | ICD-10-CM

## 2024-10-31 DIAGNOSIS — Z00.00 WELLNESS EXAMINATION: ICD-10-CM

## 2024-10-31 DIAGNOSIS — Z12.5 SCREENING PSA (PROSTATE SPECIFIC ANTIGEN): ICD-10-CM

## 2024-10-31 DIAGNOSIS — Z79.4 TYPE 2 DIABETES MELLITUS WITH HYPERGLYCEMIA, WITH LONG-TERM CURRENT USE OF INSULIN: Primary | ICD-10-CM

## 2025-03-11 ENCOUNTER — LAB REQUISITION (OUTPATIENT)
Dept: LAB | Facility: HOSPITAL | Age: 75
End: 2025-03-11
Payer: MEDICARE

## 2025-03-11 DIAGNOSIS — I10 ESSENTIAL (PRIMARY) HYPERTENSION: ICD-10-CM

## 2025-03-12 LAB
ALBUMIN SERPL-MCNC: 3 G/DL (ref 3.4–4.8)
ALBUMIN/GLOB SERPL: 0.9 RATIO (ref 1.1–2)
ALP SERPL-CCNC: 56 UNIT/L (ref 40–150)
ALT SERPL-CCNC: 15 UNIT/L (ref 0–55)
ANION GAP SERPL CALC-SCNC: 8 MEQ/L
AST SERPL-CCNC: 15 UNIT/L (ref 5–34)
BILIRUB SERPL-MCNC: 0.4 MG/DL
BUN SERPL-MCNC: 14.4 MG/DL (ref 8.4–25.7)
CALCIUM SERPL-MCNC: 9.4 MG/DL (ref 8.8–10)
CHLORIDE SERPL-SCNC: 103 MMOL/L (ref 98–107)
CHOLEST SERPL-MCNC: 91 MG/DL
CHOLEST/HDLC SERPL: 4 {RATIO} (ref 0–5)
CO2 SERPL-SCNC: 32 MMOL/L (ref 23–31)
CREAT SERPL-MCNC: 0.59 MG/DL (ref 0.72–1.25)
CREAT/UREA NIT SERPL: 24
ERYTHROCYTE [DISTWIDTH] IN BLOOD BY AUTOMATED COUNT: 14.2 % (ref 11.5–17)
GFR SERPLBLD CREATININE-BSD FMLA CKD-EPI: >60 ML/MIN/1.73/M2
GLOBULIN SER-MCNC: 3.2 GM/DL (ref 2.4–3.5)
GLUCOSE SERPL-MCNC: 38 MG/DL (ref 82–115)
HCT VFR BLD AUTO: 41.2 % (ref 42–52)
HDLC SERPL-MCNC: 25 MG/DL (ref 35–60)
HGB BLD-MCNC: 13.8 G/DL (ref 14–18)
LDLC SERPL CALC-MCNC: 54 MG/DL (ref 50–140)
MCH RBC QN AUTO: 29.7 PG (ref 27–31)
MCHC RBC AUTO-ENTMCNC: 33.5 G/DL (ref 33–36)
MCV RBC AUTO: 88.8 FL (ref 80–94)
PLATELET # BLD AUTO: 154 X10(3)/MCL (ref 130–400)
PMV BLD AUTO: 11.5 FL (ref 7.4–10.4)
POTASSIUM SERPL-SCNC: 4.9 MMOL/L (ref 3.5–5.1)
PROT SERPL-MCNC: 6.2 GM/DL (ref 5.8–7.6)
PSA SERPL-MCNC: 0.18 NG/ML
RBC # BLD AUTO: 4.64 X10(6)/MCL (ref 4.7–6.1)
SODIUM SERPL-SCNC: 143 MMOL/L (ref 136–145)
TRIGL SERPL-MCNC: 60 MG/DL (ref 34–140)
VLDLC SERPL CALC-MCNC: 12 MG/DL
WBC # BLD AUTO: 8.89 X10(3)/MCL (ref 4.5–11.5)

## 2025-03-12 PROCEDURE — 85027 COMPLETE CBC AUTOMATED: CPT | Performed by: FAMILY MEDICINE

## 2025-03-12 PROCEDURE — 84153 ASSAY OF PSA TOTAL: CPT | Performed by: FAMILY MEDICINE

## 2025-03-12 PROCEDURE — 80061 LIPID PANEL: CPT | Performed by: FAMILY MEDICINE

## 2025-03-12 PROCEDURE — 80053 COMPREHEN METABOLIC PANEL: CPT | Performed by: FAMILY MEDICINE

## 2025-05-07 ENCOUNTER — LAB REQUISITION (OUTPATIENT)
Dept: LAB | Facility: HOSPITAL | Age: 75
End: 2025-05-07
Payer: MEDICARE

## 2025-05-07 DIAGNOSIS — E04.1 NONTOXIC SINGLE THYROID NODULE: ICD-10-CM

## 2025-05-08 LAB — TSH SERPL-ACNC: 2.01 UIU/ML (ref 0.35–4.94)

## 2025-05-08 PROCEDURE — 84443 ASSAY THYROID STIM HORMONE: CPT | Performed by: FAMILY MEDICINE

## 2025-07-10 ENCOUNTER — LAB REQUISITION (OUTPATIENT)
Dept: LAB | Facility: HOSPITAL | Age: 75
End: 2025-07-10
Payer: MEDICARE

## 2025-07-10 DIAGNOSIS — N39.0 URINARY TRACT INFECTION, SITE NOT SPECIFIED: ICD-10-CM

## 2025-07-11 LAB
ALBUMIN SERPL-MCNC: 3.1 G/DL (ref 3.4–4.8)
ALBUMIN/GLOB SERPL: 1.1 RATIO (ref 1.1–2)
ALP SERPL-CCNC: 58 UNIT/L (ref 40–150)
ALT SERPL-CCNC: 16 UNIT/L (ref 0–55)
ANION GAP SERPL CALC-SCNC: 11 MEQ/L
AST SERPL-CCNC: 15 UNIT/L (ref 11–45)
BILIRUB SERPL-MCNC: 0.6 MG/DL
BILIRUB UR QL STRIP.AUTO: NEGATIVE
BUN SERPL-MCNC: 10.8 MG/DL (ref 8.4–25.7)
CALCIUM SERPL-MCNC: 8.8 MG/DL (ref 8.8–10)
CHLORIDE SERPL-SCNC: 105 MMOL/L (ref 98–107)
CLARITY UR: CLEAR
CO2 SERPL-SCNC: 30 MMOL/L (ref 23–31)
COLOR UR AUTO: NORMAL
CREAT SERPL-MCNC: 0.58 MG/DL (ref 0.72–1.25)
CREAT/UREA NIT SERPL: 19
ERYTHROCYTE [DISTWIDTH] IN BLOOD BY AUTOMATED COUNT: 13.9 % (ref 11.5–17)
GFR SERPLBLD CREATININE-BSD FMLA CKD-EPI: >60 ML/MIN/1.73/M2
GLOBULIN SER-MCNC: 2.8 GM/DL (ref 2.4–3.5)
GLUCOSE SERPL-MCNC: 46 MG/DL (ref 82–115)
GLUCOSE UR QL STRIP: NEGATIVE
HCT VFR BLD AUTO: 37.7 % (ref 42–52)
HGB BLD-MCNC: 12.7 G/DL (ref 14–18)
HGB UR QL STRIP: NEGATIVE
KETONES UR QL STRIP: NEGATIVE
LEUKOCYTE ESTERASE UR QL STRIP: NEGATIVE
MCH RBC QN AUTO: 29.8 PG (ref 27–31)
MCHC RBC AUTO-ENTMCNC: 33.7 G/DL (ref 33–36)
MCV RBC AUTO: 88.5 FL (ref 80–94)
NITRITE UR QL STRIP: NEGATIVE
PH UR STRIP: 7 [PH]
PLATELET # BLD AUTO: 168 X10(3)/MCL (ref 130–400)
PMV BLD AUTO: 11.3 FL (ref 7.4–10.4)
POTASSIUM SERPL-SCNC: 3.3 MMOL/L (ref 3.5–5.1)
PROT SERPL-MCNC: 5.9 GM/DL (ref 5.8–7.6)
PROT UR QL STRIP: NEGATIVE
RBC # BLD AUTO: 4.26 X10(6)/MCL (ref 4.7–6.1)
SODIUM SERPL-SCNC: 146 MMOL/L (ref 136–145)
SP GR UR STRIP.AUTO: 1.01 (ref 1–1.03)
UROBILINOGEN UR STRIP-ACNC: 0.2
WBC # BLD AUTO: 10.22 X10(3)/MCL (ref 4.5–11.5)

## 2025-07-11 PROCEDURE — 80053 COMPREHEN METABOLIC PANEL: CPT | Performed by: FAMILY MEDICINE

## 2025-07-11 PROCEDURE — 87086 URINE CULTURE/COLONY COUNT: CPT | Performed by: FAMILY MEDICINE

## 2025-07-11 PROCEDURE — 85027 COMPLETE CBC AUTOMATED: CPT | Performed by: FAMILY MEDICINE

## 2025-07-11 PROCEDURE — 81003 URINALYSIS AUTO W/O SCOPE: CPT | Performed by: FAMILY MEDICINE

## 2025-07-13 ENCOUNTER — LAB REQUISITION (OUTPATIENT)
Dept: LAB | Facility: HOSPITAL | Age: 75
End: 2025-07-13
Payer: MEDICARE

## 2025-07-13 DIAGNOSIS — R53.81 OTHER MALAISE: ICD-10-CM

## 2025-07-13 DIAGNOSIS — R41.82 ALTERED MENTAL STATUS, UNSPECIFIED: ICD-10-CM

## 2025-07-13 LAB — BACTERIA UR CULT: NO GROWTH

## 2025-07-14 LAB
ALBUMIN SERPL-MCNC: 3.2 G/DL (ref 3.4–4.8)
ALBUMIN/GLOB SERPL: 1.1 RATIO (ref 1.1–2)
ALP SERPL-CCNC: 61 UNIT/L (ref 40–150)
ALT SERPL-CCNC: 15 UNIT/L (ref 0–55)
ANION GAP SERPL CALC-SCNC: 14 MEQ/L
AST SERPL-CCNC: 18 UNIT/L (ref 11–45)
BILIRUB SERPL-MCNC: 0.7 MG/DL
BUN SERPL-MCNC: 9.7 MG/DL (ref 8.4–25.7)
CALCIUM SERPL-MCNC: 8.7 MG/DL (ref 8.8–10)
CHLORIDE SERPL-SCNC: 104 MMOL/L (ref 98–107)
CO2 SERPL-SCNC: 26 MMOL/L (ref 23–31)
CREAT SERPL-MCNC: 0.59 MG/DL (ref 0.72–1.25)
CREAT/UREA NIT SERPL: 16
ERYTHROCYTE [DISTWIDTH] IN BLOOD BY AUTOMATED COUNT: 13.6 % (ref 11.5–17)
GFR SERPLBLD CREATININE-BSD FMLA CKD-EPI: >60 ML/MIN/1.73/M2
GLOBULIN SER-MCNC: 3 GM/DL (ref 2.4–3.5)
GLUCOSE SERPL-MCNC: 90 MG/DL (ref 82–115)
HCT VFR BLD AUTO: 39.3 % (ref 42–52)
HGB BLD-MCNC: 13.6 G/DL (ref 14–18)
MCH RBC QN AUTO: 30.3 PG (ref 27–31)
MCHC RBC AUTO-ENTMCNC: 34.6 G/DL (ref 33–36)
MCV RBC AUTO: 87.5 FL (ref 80–94)
PLATELET # BLD AUTO: 164 X10(3)/MCL (ref 130–400)
PMV BLD AUTO: 11.4 FL (ref 7.4–10.4)
POTASSIUM SERPL-SCNC: 3.8 MMOL/L (ref 3.5–5.1)
PROT SERPL-MCNC: 6.2 GM/DL (ref 5.8–7.6)
RBC # BLD AUTO: 4.49 X10(6)/MCL (ref 4.7–6.1)
SODIUM SERPL-SCNC: 144 MMOL/L (ref 136–145)
WBC # BLD AUTO: 9.1 X10(3)/MCL (ref 4.5–11.5)

## 2025-07-14 PROCEDURE — 85027 COMPLETE CBC AUTOMATED: CPT | Performed by: FAMILY MEDICINE

## 2025-07-14 PROCEDURE — 80053 COMPREHEN METABOLIC PANEL: CPT | Performed by: FAMILY MEDICINE

## 2025-07-27 ENCOUNTER — LAB REQUISITION (OUTPATIENT)
Dept: LAB | Facility: HOSPITAL | Age: 75
End: 2025-07-27
Payer: MEDICARE

## 2025-07-27 DIAGNOSIS — Z13.228 ENCOUNTER FOR SCREENING FOR OTHER METABOLIC DISORDERS: ICD-10-CM

## 2025-07-28 LAB
ALBUMIN SERPL-MCNC: 3.1 G/DL (ref 3.4–4.8)
ALBUMIN/GLOB SERPL: 1.1 RATIO (ref 1.1–2)
ALP SERPL-CCNC: 63 UNIT/L (ref 40–150)
ALT SERPL-CCNC: 13 UNIT/L (ref 0–55)
ANION GAP SERPL CALC-SCNC: 11 MEQ/L
AST SERPL-CCNC: 11 UNIT/L (ref 11–45)
BILIRUB SERPL-MCNC: 0.6 MG/DL
BUN SERPL-MCNC: 12.4 MG/DL (ref 8.4–25.7)
CALCIUM SERPL-MCNC: 8.7 MG/DL (ref 8.8–10)
CHLORIDE SERPL-SCNC: 104 MMOL/L (ref 98–107)
CO2 SERPL-SCNC: 27 MMOL/L (ref 23–31)
CREAT SERPL-MCNC: 0.54 MG/DL (ref 0.72–1.25)
CREAT/UREA NIT SERPL: 23
GFR SERPLBLD CREATININE-BSD FMLA CKD-EPI: >60 ML/MIN/1.73/M2
GLOBULIN SER-MCNC: 2.7 GM/DL (ref 2.4–3.5)
GLUCOSE SERPL-MCNC: 134 MG/DL (ref 82–115)
POTASSIUM SERPL-SCNC: 3.7 MMOL/L (ref 3.5–5.1)
PROT SERPL-MCNC: 5.8 GM/DL (ref 5.8–7.6)
SODIUM SERPL-SCNC: 142 MMOL/L (ref 136–145)

## 2025-07-28 PROCEDURE — 80053 COMPREHEN METABOLIC PANEL: CPT | Performed by: FAMILY MEDICINE
